# Patient Record
Sex: FEMALE | Race: WHITE | NOT HISPANIC OR LATINO | Employment: UNEMPLOYED
[De-identification: names, ages, dates, MRNs, and addresses within clinical notes are randomized per-mention and may not be internally consistent; named-entity substitution may affect disease eponyms.]

---

## 2017-02-04 ENCOUNTER — RECORDS - HEALTHEAST (OUTPATIENT)
Dept: ADMINISTRATIVE | Facility: OTHER | Age: 62
End: 2017-02-04

## 2017-02-06 ENCOUNTER — RECORDS - HEALTHEAST (OUTPATIENT)
Dept: ADMINISTRATIVE | Facility: OTHER | Age: 62
End: 2017-02-06

## 2017-05-08 ENCOUNTER — RECORDS - HEALTHEAST (OUTPATIENT)
Dept: ADMINISTRATIVE | Facility: OTHER | Age: 62
End: 2017-05-08

## 2017-08-09 ENCOUNTER — RECORDS - HEALTHEAST (OUTPATIENT)
Dept: ADMINISTRATIVE | Facility: OTHER | Age: 62
End: 2017-08-09

## 2017-08-10 ENCOUNTER — RECORDS - HEALTHEAST (OUTPATIENT)
Dept: ADMINISTRATIVE | Facility: OTHER | Age: 62
End: 2017-08-10

## 2017-09-27 ENCOUNTER — RECORDS - HEALTHEAST (OUTPATIENT)
Dept: ADMINISTRATIVE | Facility: OTHER | Age: 62
End: 2017-09-27

## 2017-09-27 LAB
HPV_EXT - HISTORICAL: NORMAL
PAP SMEAR - HIM PATIENT REPORTED: NORMAL

## 2017-11-10 ENCOUNTER — RECORDS - HEALTHEAST (OUTPATIENT)
Dept: ADMINISTRATIVE | Facility: OTHER | Age: 62
End: 2017-11-10

## 2017-11-16 ENCOUNTER — RECORDS - HEALTHEAST (OUTPATIENT)
Dept: ADMINISTRATIVE | Facility: OTHER | Age: 62
End: 2017-11-16

## 2017-12-24 ENCOUNTER — HEALTH MAINTENANCE LETTER (OUTPATIENT)
Age: 62
End: 2017-12-24

## 2018-01-17 ENCOUNTER — OFFICE VISIT - HEALTHEAST (OUTPATIENT)
Dept: INTERNAL MEDICINE | Facility: CLINIC | Age: 63
End: 2018-01-17

## 2018-01-17 DIAGNOSIS — Z12.31 VISIT FOR SCREENING MAMMOGRAM: ICD-10-CM

## 2018-01-17 DIAGNOSIS — E11.9 TYPE 2 DIABETES MELLITUS (H): ICD-10-CM

## 2018-01-17 DIAGNOSIS — G47.33 OSA (OBSTRUCTIVE SLEEP APNEA): ICD-10-CM

## 2018-01-17 DIAGNOSIS — K21.9 GASTROESOPHAGEAL REFLUX DISEASE, ESOPHAGITIS PRESENCE NOT SPECIFIED: ICD-10-CM

## 2018-01-17 DIAGNOSIS — E66.01 MORBID OBESITY (H): ICD-10-CM

## 2018-01-17 DIAGNOSIS — R00.0 TACHYCARDIA: ICD-10-CM

## 2018-01-17 DIAGNOSIS — Z12.11 COLON CANCER SCREENING: ICD-10-CM

## 2018-01-17 DIAGNOSIS — H10.32 ACUTE CONJUNCTIVITIS OF LEFT EYE, UNSPECIFIED ACUTE CONJUNCTIVITIS TYPE: ICD-10-CM

## 2018-01-17 DIAGNOSIS — E03.9 HYPOTHYROID: ICD-10-CM

## 2018-01-17 DIAGNOSIS — E55.9 VITAMIN D DEFICIENCY: ICD-10-CM

## 2018-01-17 LAB
ALBUMIN SERPL-MCNC: 3.3 G/DL (ref 3.5–5)
ALP SERPL-CCNC: 136 U/L (ref 45–120)
ALT SERPL W P-5'-P-CCNC: 19 U/L (ref 0–45)
ANION GAP SERPL CALCULATED.3IONS-SCNC: 10 MMOL/L (ref 5–18)
AST SERPL W P-5'-P-CCNC: 20 U/L (ref 0–40)
ATRIAL RATE - MUSE: 98 BPM
BILIRUB SERPL-MCNC: 0.7 MG/DL (ref 0–1)
BUN SERPL-MCNC: 19 MG/DL (ref 8–22)
CALCIUM SERPL-MCNC: 9.5 MG/DL (ref 8.5–10.5)
CHLORIDE BLD-SCNC: 101 MMOL/L (ref 98–107)
CO2 SERPL-SCNC: 27 MMOL/L (ref 22–31)
CREAT SERPL-MCNC: 0.96 MG/DL (ref 0.6–1.1)
DIASTOLIC BLOOD PRESSURE - MUSE: NORMAL MMHG
GFR SERPL CREATININE-BSD FRML MDRD: 59 ML/MIN/1.73M2
GLUCOSE BLD-MCNC: 194 MG/DL (ref 70–125)
HBA1C MFR BLD: 6.5 % (ref 3.5–6)
INTERPRETATION ECG - MUSE: NORMAL
LDLC SERPL CALC-MCNC: 76 MG/DL
P AXIS - MUSE: 40 DEGREES
POTASSIUM BLD-SCNC: 3.9 MMOL/L (ref 3.5–5)
PR INTERVAL - MUSE: 150 MS
PROT SERPL-MCNC: 7.6 G/DL (ref 6–8)
QRS DURATION - MUSE: 80 MS
QT - MUSE: 348 MS
QTC - MUSE: 444 MS
R AXIS - MUSE: 57 DEGREES
SODIUM SERPL-SCNC: 138 MMOL/L (ref 136–145)
SYSTOLIC BLOOD PRESSURE - MUSE: NORMAL MMHG
T AXIS - MUSE: 18 DEGREES
TSH SERPL DL<=0.005 MIU/L-ACNC: 3.56 UIU/ML (ref 0.3–5)
VENTRICULAR RATE- MUSE: 98 BPM

## 2018-01-17 ASSESSMENT — MIFFLIN-ST. JEOR: SCORE: 2093.57

## 2018-01-18 ENCOUNTER — COMMUNICATION - HEALTHEAST (OUTPATIENT)
Dept: INTERNAL MEDICINE | Facility: CLINIC | Age: 63
End: 2018-01-18

## 2018-01-19 ENCOUNTER — COMMUNICATION - HEALTHEAST (OUTPATIENT)
Dept: INTERNAL MEDICINE | Facility: CLINIC | Age: 63
End: 2018-01-19

## 2018-01-22 ENCOUNTER — AMBULATORY - HEALTHEAST (OUTPATIENT)
Dept: EDUCATION SERVICES | Facility: CLINIC | Age: 63
End: 2018-01-22

## 2018-01-22 DIAGNOSIS — E10.65 CONTROLLED TYPE 1 DIABETES MELLITUS WITH HYPERGLYCEMIA (H): ICD-10-CM

## 2018-01-29 ENCOUNTER — RECORDS - HEALTHEAST (OUTPATIENT)
Dept: ADMINISTRATIVE | Facility: OTHER | Age: 63
End: 2018-01-29

## 2018-01-30 ENCOUNTER — COMMUNICATION - HEALTHEAST (OUTPATIENT)
Dept: EDUCATION SERVICES | Facility: CLINIC | Age: 63
End: 2018-01-30

## 2018-02-08 ENCOUNTER — COMMUNICATION - HEALTHEAST (OUTPATIENT)
Dept: INTERNAL MEDICINE | Facility: CLINIC | Age: 63
End: 2018-02-08

## 2018-02-08 ENCOUNTER — RECORDS - HEALTHEAST (OUTPATIENT)
Dept: ADMINISTRATIVE | Facility: OTHER | Age: 63
End: 2018-02-08

## 2018-02-14 ENCOUNTER — COMMUNICATION - HEALTHEAST (OUTPATIENT)
Dept: INTERNAL MEDICINE | Facility: CLINIC | Age: 63
End: 2018-02-14

## 2018-02-15 ENCOUNTER — RECORDS - HEALTHEAST (OUTPATIENT)
Dept: ADMINISTRATIVE | Facility: OTHER | Age: 63
End: 2018-02-15

## 2018-02-21 ENCOUNTER — COMMUNICATION - HEALTHEAST (OUTPATIENT)
Dept: INTERNAL MEDICINE | Facility: CLINIC | Age: 63
End: 2018-02-21

## 2018-02-26 ENCOUNTER — COMMUNICATION - HEALTHEAST (OUTPATIENT)
Dept: INTERNAL MEDICINE | Facility: CLINIC | Age: 63
End: 2018-02-26

## 2018-02-27 ENCOUNTER — OFFICE VISIT - HEALTHEAST (OUTPATIENT)
Dept: ENDOCRINOLOGY | Facility: CLINIC | Age: 63
End: 2018-02-27

## 2018-02-27 ENCOUNTER — AMBULATORY - HEALTHEAST (OUTPATIENT)
Dept: ENDOCRINOLOGY | Facility: CLINIC | Age: 63
End: 2018-02-27

## 2018-02-27 DIAGNOSIS — E11.9 TYPE 2 DIABETES MELLITUS (H): ICD-10-CM

## 2018-02-27 ASSESSMENT — MIFFLIN-ST. JEOR: SCORE: 2119.43

## 2018-02-28 ENCOUNTER — RECORDS - HEALTHEAST (OUTPATIENT)
Dept: ADMINISTRATIVE | Facility: OTHER | Age: 63
End: 2018-02-28

## 2018-03-05 ENCOUNTER — OFFICE VISIT - HEALTHEAST (OUTPATIENT)
Dept: INTERNAL MEDICINE | Facility: CLINIC | Age: 63
End: 2018-03-05

## 2018-03-05 DIAGNOSIS — H10.9 CONJUNCTIVITIS: ICD-10-CM

## 2018-03-05 ASSESSMENT — MIFFLIN-ST. JEOR: SCORE: 2116.25

## 2018-03-07 ENCOUNTER — HOSPITAL ENCOUNTER (OUTPATIENT)
Dept: MAMMOGRAPHY | Facility: CLINIC | Age: 63
Discharge: HOME | End: 2018-03-07
Attending: INTERNAL MEDICINE

## 2018-03-07 DIAGNOSIS — Z12.31 VISIT FOR SCREENING MAMMOGRAM: ICD-10-CM

## 2018-03-16 ENCOUNTER — RECORDS - HEALTHEAST (OUTPATIENT)
Dept: ADMINISTRATIVE | Facility: OTHER | Age: 63
End: 2018-03-16

## 2018-03-20 ENCOUNTER — COMMUNICATION - HEALTHEAST (OUTPATIENT)
Dept: INTERNAL MEDICINE | Facility: CLINIC | Age: 63
End: 2018-03-20

## 2018-04-20 ENCOUNTER — OFFICE VISIT - HEALTHEAST (OUTPATIENT)
Dept: INTERNAL MEDICINE | Facility: CLINIC | Age: 63
End: 2018-04-20

## 2018-04-20 DIAGNOSIS — J30.9 ALLERGIC RHINITIS: ICD-10-CM

## 2018-04-20 ASSESSMENT — MIFFLIN-ST. JEOR: SCORE: 2116.25

## 2018-05-22 ENCOUNTER — COMMUNICATION - HEALTHEAST (OUTPATIENT)
Dept: INTERNAL MEDICINE | Facility: CLINIC | Age: 63
End: 2018-05-22

## 2018-05-24 ENCOUNTER — RECORDS - HEALTHEAST (OUTPATIENT)
Dept: ADMINISTRATIVE | Facility: OTHER | Age: 63
End: 2018-05-24

## 2018-06-25 ENCOUNTER — AMBULATORY - HEALTHEAST (OUTPATIENT)
Dept: LAB | Facility: CLINIC | Age: 63
End: 2018-06-25

## 2018-06-25 DIAGNOSIS — E11.9 TYPE 2 DIABETES MELLITUS (H): ICD-10-CM

## 2018-06-25 LAB
CREAT UR-MCNC: 239 MG/DL
HBA1C MFR BLD: 6.2 % (ref 3.5–6)
MICROALBUMIN UR-MCNC: 1.16 MG/DL (ref 0–1.99)
MICROALBUMIN/CREAT UR: 4.9 MG/G

## 2018-07-24 ENCOUNTER — OFFICE VISIT - HEALTHEAST (OUTPATIENT)
Dept: INTERNAL MEDICINE | Facility: CLINIC | Age: 63
End: 2018-07-24

## 2018-07-24 DIAGNOSIS — E66.01 MORBID OBESITY (H): ICD-10-CM

## 2018-07-24 DIAGNOSIS — K21.9 GASTROESOPHAGEAL REFLUX DISEASE, ESOPHAGITIS PRESENCE NOT SPECIFIED: ICD-10-CM

## 2018-07-24 DIAGNOSIS — R60.9 EDEMA, UNSPECIFIED TYPE: ICD-10-CM

## 2018-07-24 DIAGNOSIS — E03.9 ACQUIRED HYPOTHYROIDISM: ICD-10-CM

## 2018-07-24 DIAGNOSIS — G47.33 OSA (OBSTRUCTIVE SLEEP APNEA): ICD-10-CM

## 2018-07-24 DIAGNOSIS — E55.9 VITAMIN D DEFICIENCY: ICD-10-CM

## 2018-07-24 DIAGNOSIS — E10.9 TYPE 1 DIABETES MELLITUS (H): ICD-10-CM

## 2018-07-24 LAB
ALBUMIN SERPL-MCNC: 3.1 G/DL (ref 3.5–5)
ALBUMIN UR-MCNC: ABNORMAL MG/DL
ALP SERPL-CCNC: 103 U/L (ref 45–120)
ALT SERPL W P-5'-P-CCNC: 24 U/L (ref 0–45)
ANION GAP SERPL CALCULATED.3IONS-SCNC: 10 MMOL/L (ref 5–18)
APPEARANCE UR: ABNORMAL
AST SERPL W P-5'-P-CCNC: 21 U/L (ref 0–40)
BACTERIA #/AREA URNS HPF: ABNORMAL HPF
BILIRUB SERPL-MCNC: 0.8 MG/DL (ref 0–1)
BILIRUB UR QL STRIP: ABNORMAL
BUN SERPL-MCNC: 13 MG/DL (ref 8–22)
CALCIUM SERPL-MCNC: 9.3 MG/DL (ref 8.5–10.5)
CHLORIDE BLD-SCNC: 108 MMOL/L (ref 98–107)
CHOLEST SERPL-MCNC: 109 MG/DL
CO2 SERPL-SCNC: 23 MMOL/L (ref 22–31)
COLOR UR AUTO: YELLOW
CREAT SERPL-MCNC: 0.86 MG/DL (ref 0.6–1.1)
FASTING STATUS PATIENT QL REPORTED: YES
GFR SERPL CREATININE-BSD FRML MDRD: >60 ML/MIN/1.73M2
GLUCOSE BLD-MCNC: 206 MG/DL (ref 70–125)
GLUCOSE UR STRIP-MCNC: NEGATIVE MG/DL
HDLC SERPL-MCNC: 43 MG/DL
HGB UR QL STRIP: ABNORMAL
KETONES UR STRIP-MCNC: ABNORMAL MG/DL
LDLC SERPL CALC-MCNC: 50 MG/DL
LEUKOCYTE ESTERASE UR QL STRIP: ABNORMAL
MUCOUS THREADS #/AREA URNS LPF: ABNORMAL LPF
NITRATE UR QL: NEGATIVE
PH UR STRIP: 5.5 [PH] (ref 5–8)
POTASSIUM BLD-SCNC: 4 MMOL/L (ref 3.5–5)
PROT SERPL-MCNC: 6.4 G/DL (ref 6–8)
RBC #/AREA URNS AUTO: ABNORMAL HPF
SODIUM SERPL-SCNC: 141 MMOL/L (ref 136–145)
SP GR UR STRIP: >=1.03 (ref 1–1.03)
SQUAMOUS #/AREA URNS AUTO: ABNORMAL LPF
TRIGL SERPL-MCNC: 79 MG/DL
TSH SERPL DL<=0.005 MIU/L-ACNC: 1.2 UIU/ML (ref 0.3–5)
UROBILINOGEN UR STRIP-ACNC: ABNORMAL
WBC #/AREA URNS AUTO: ABNORMAL HPF
WBC CLUMPS #/AREA URNS HPF: PRESENT /[HPF]

## 2018-07-24 ASSESSMENT — MIFFLIN-ST. JEOR: SCORE: 2070.89

## 2018-07-25 LAB
25(OH)D3 SERPL-MCNC: 47.4 NG/ML (ref 30–80)
BACTERIA SPEC CULT: NORMAL

## 2018-07-26 ENCOUNTER — COMMUNICATION - HEALTHEAST (OUTPATIENT)
Dept: INTERNAL MEDICINE | Facility: CLINIC | Age: 63
End: 2018-07-26

## 2018-07-26 DIAGNOSIS — R82.90 ABNORMAL URINALYSIS: ICD-10-CM

## 2018-07-31 ENCOUNTER — HOSPITAL ENCOUNTER (OUTPATIENT)
Dept: ULTRASOUND IMAGING | Facility: CLINIC | Age: 63
Discharge: HOME | End: 2018-07-31
Attending: INTERNAL MEDICINE

## 2018-07-31 ENCOUNTER — AMBULATORY - HEALTHEAST (OUTPATIENT)
Dept: LAB | Facility: CLINIC | Age: 63
End: 2018-07-31

## 2018-07-31 ENCOUNTER — COMMUNICATION - HEALTHEAST (OUTPATIENT)
Dept: INTERNAL MEDICINE | Facility: CLINIC | Age: 63
End: 2018-07-31

## 2018-07-31 DIAGNOSIS — R82.90 ABNORMAL URINALYSIS: ICD-10-CM

## 2018-07-31 DIAGNOSIS — R60.9 EDEMA, UNSPECIFIED TYPE: ICD-10-CM

## 2018-07-31 LAB
ALBUMIN UR-MCNC: ABNORMAL MG/DL
APPEARANCE UR: CLEAR
BACTERIA #/AREA URNS HPF: ABNORMAL HPF
BILIRUB UR QL STRIP: ABNORMAL
COLOR UR AUTO: YELLOW
GLUCOSE UR STRIP-MCNC: ABNORMAL MG/DL
HGB UR QL STRIP: NEGATIVE
HYALINE CASTS #/AREA URNS LPF: ABNORMAL LPF
KETONES UR STRIP-MCNC: ABNORMAL MG/DL
LEUKOCYTE ESTERASE UR QL STRIP: NEGATIVE
MUCOUS THREADS #/AREA URNS LPF: ABNORMAL LPF
NITRATE UR QL: NEGATIVE
PH UR STRIP: 5.5 [PH] (ref 5–8)
RBC #/AREA URNS AUTO: ABNORMAL HPF
SP GR UR STRIP: >=1.03 (ref 1–1.03)
SQUAMOUS #/AREA URNS AUTO: ABNORMAL LPF
UROBILINOGEN UR STRIP-ACNC: ABNORMAL
WBC #/AREA URNS AUTO: ABNORMAL HPF

## 2018-08-02 ENCOUNTER — COMMUNICATION - HEALTHEAST (OUTPATIENT)
Dept: INTERNAL MEDICINE | Facility: CLINIC | Age: 63
End: 2018-08-02

## 2018-08-02 DIAGNOSIS — R82.90 ABNORMAL URINALYSIS: ICD-10-CM

## 2018-08-10 ENCOUNTER — HOSPITAL ENCOUNTER (OUTPATIENT)
Dept: CARDIOLOGY | Facility: CLINIC | Age: 63
Discharge: HOME | End: 2018-08-10
Attending: INTERNAL MEDICINE

## 2018-08-10 DIAGNOSIS — R60.9 EDEMA, UNSPECIFIED TYPE: ICD-10-CM

## 2018-08-10 LAB
AORTIC ROOT: 3.1 CM
AORTIC VALVE MEAN VELOCITY: 117 CM/S
AV DIMENSIONLESS INDEX VTI: 0.8
AV MEAN GRADIENT: 6 MMHG
AV PEAK GRADIENT: 9.9 MMHG
AV VALVE AREA: 2.4 CM2
AV VELOCITY RATIO: 0.9
BSA FOR ECHO PROCEDURE: 2.63 M2
CV BLOOD PRESSURE: NORMAL MMHG
CV ECHO HEIGHT: 65 IN
CV ECHO WEIGHT: 334 LBS
DOP CALC AO PEAK VEL: 157 CM/S
DOP CALC AO VTI: 35.4 CM
DOP CALC LVOT AREA: 3.14 CM2
DOP CALC LVOT DIAMETER: 2 CM
DOP CALC LVOT PEAK VEL: 134 CM/S
DOP CALC LVOT STROKE VOLUME: 84.5 CM3
DOP CALCLVOT PEAK VEL VTI: 26.9 CM
EJECTION FRACTION: 57 % (ref 55–75)
FRACTIONAL SHORTENING: 31.4 % (ref 28–44)
INTERVENTRICULAR SEPTUM IN END DIASTOLE: 1 CM (ref 0.6–0.9)
IVS/PW RATIO: 1
LA AREA 1: 15.5 CM2
LA AREA 2: 19.7 CM2
LEFT ATRIUM LENGTH: 5.03 CM
LEFT ATRIUM SIZE: 4.3 CM
LEFT ATRIUM TO AORTIC ROOT RATIO: 1.39 NO UNITS
LEFT ATRIUM VOLUME INDEX: 19.6 ML/M2
LEFT ATRIUM VOLUME: 51.6 ML
LEFT VENTRICLE CARDIAC INDEX: 2.4 L/MIN/M2
LEFT VENTRICLE CARDIAC OUTPUT: 6.4 L/MIN
LEFT VENTRICLE DIASTOLIC VOLUME INDEX: 25.5 CM3/M2 (ref 34–74)
LEFT VENTRICLE DIASTOLIC VOLUME: 67 CM3 (ref 46–106)
LEFT VENTRICLE HEART RATE: 76 BPM
LEFT VENTRICLE MASS INDEX: 71.5 G/M2
LEFT VENTRICLE SYSTOLIC VOLUME INDEX: 11 CM3/M2 (ref 11–31)
LEFT VENTRICLE SYSTOLIC VOLUME: 29 CM3 (ref 14–42)
LEFT VENTRICULAR INTERNAL DIMENSION IN DIASTOLE: 5.1 CM (ref 3.8–5.2)
LEFT VENTRICULAR INTERNAL DIMENSION IN SYSTOLE: 3.5 CM (ref 2.2–3.5)
LEFT VENTRICULAR MASS: 188 G
LEFT VENTRICULAR OUTFLOW TRACT MEAN GRADIENT: 4 MMHG
LEFT VENTRICULAR OUTFLOW TRACT MEAN VELOCITY: 85.8 CM/S
LEFT VENTRICULAR OUTFLOW TRACT PEAK GRADIENT: 7 MMHG
LEFT VENTRICULAR POSTERIOR WALL IN END DIASTOLE: 1 CM (ref 0.6–0.9)
LV STROKE VOLUME INDEX: 32.1 ML/M2
MITRAL VALVE E/A RATIO: 1
MV AVERAGE E/E' RATIO: 7 CM/S
MV DECELERATION TIME: 296 MS
MV E'TISSUE VEL-LAT: 12.4 CM/S
MV E'TISSUE VEL-MED: 8.68 CM/S
MV LATERAL E/E' RATIO: 5.9
MV MEDIAL E/E' RATIO: 8.5
MV PEAK A VELOCITY: 70.2 CM/S
MV PEAK E VELOCITY: 73.7 CM/S
NUC REST DIASTOLIC VOLUME INDEX: 5344 LBS
NUC REST SYSTOLIC VOLUME INDEX: 65 IN
TRICUSPID VALVE ANULAR PLANE SYSTOLIC EXCURSION: 1.9 CM

## 2018-08-10 ASSESSMENT — MIFFLIN-ST. JEOR: SCORE: 2070.89

## 2018-08-14 ENCOUNTER — COMMUNICATION - HEALTHEAST (OUTPATIENT)
Dept: INTERNAL MEDICINE | Facility: CLINIC | Age: 63
End: 2018-08-14

## 2018-08-16 ENCOUNTER — AMBULATORY - HEALTHEAST (OUTPATIENT)
Dept: LAB | Facility: CLINIC | Age: 63
End: 2018-08-16

## 2018-08-16 DIAGNOSIS — R82.90 ABNORMAL URINALYSIS: ICD-10-CM

## 2018-08-16 LAB
ALBUMIN UR-MCNC: NEGATIVE MG/DL
APPEARANCE UR: ABNORMAL
BACTERIA #/AREA URNS HPF: ABNORMAL HPF
BILIRUB UR QL STRIP: NEGATIVE
COLOR UR AUTO: YELLOW
GLUCOSE UR STRIP-MCNC: ABNORMAL MG/DL
HGB UR QL STRIP: NEGATIVE
KETONES UR STRIP-MCNC: NEGATIVE MG/DL
LEUKOCYTE ESTERASE UR QL STRIP: ABNORMAL
NITRATE UR QL: NEGATIVE
PH UR STRIP: 5.5 [PH] (ref 5–8)
RBC #/AREA URNS AUTO: ABNORMAL HPF
SP GR UR STRIP: 1.02 (ref 1–1.03)
SQUAMOUS #/AREA URNS AUTO: ABNORMAL LPF
UROBILINOGEN UR STRIP-ACNC: ABNORMAL
WBC #/AREA URNS AUTO: ABNORMAL HPF

## 2018-08-17 ENCOUNTER — COMMUNICATION - HEALTHEAST (OUTPATIENT)
Dept: INTERNAL MEDICINE | Facility: CLINIC | Age: 63
End: 2018-08-17

## 2018-08-18 LAB — BACTERIA SPEC CULT: NORMAL

## 2018-10-09 ENCOUNTER — AMBULATORY - HEALTHEAST (OUTPATIENT)
Dept: NURSING | Facility: CLINIC | Age: 63
End: 2018-10-09

## 2018-10-09 DIAGNOSIS — Z23 NEED FOR INFLUENZA VACCINATION: ICD-10-CM

## 2018-11-14 ENCOUNTER — OFFICE VISIT - HEALTHEAST (OUTPATIENT)
Dept: INTERNAL MEDICINE | Facility: CLINIC | Age: 63
End: 2018-11-14

## 2018-11-14 ENCOUNTER — COMMUNICATION - HEALTHEAST (OUTPATIENT)
Dept: INTERNAL MEDICINE | Facility: CLINIC | Age: 63
End: 2018-11-14

## 2018-11-14 ENCOUNTER — COMMUNICATION - HEALTHEAST (OUTPATIENT)
Dept: NURSING | Facility: CLINIC | Age: 63
End: 2018-11-14

## 2018-11-14 DIAGNOSIS — R21 RASH: ICD-10-CM

## 2018-11-14 DIAGNOSIS — E10.9 TYPE 1 DIABETES MELLITUS WITHOUT COMPLICATION (H): ICD-10-CM

## 2018-11-14 DIAGNOSIS — J06.9 URTI (ACUTE UPPER RESPIRATORY INFECTION): ICD-10-CM

## 2018-11-14 ASSESSMENT — MIFFLIN-ST. JEOR: SCORE: 2021

## 2018-11-23 ENCOUNTER — COMMUNICATION - HEALTHEAST (OUTPATIENT)
Dept: NURSING | Facility: CLINIC | Age: 63
End: 2018-11-23

## 2018-12-27 ENCOUNTER — COMMUNICATION - HEALTHEAST (OUTPATIENT)
Dept: ENDOCRINOLOGY | Facility: CLINIC | Age: 63
End: 2018-12-27

## 2018-12-27 ENCOUNTER — COMMUNICATION - HEALTHEAST (OUTPATIENT)
Dept: INTERNAL MEDICINE | Facility: CLINIC | Age: 63
End: 2018-12-27

## 2018-12-27 ENCOUNTER — AMBULATORY - HEALTHEAST (OUTPATIENT)
Dept: ENDOCRINOLOGY | Facility: CLINIC | Age: 63
End: 2018-12-27

## 2018-12-27 DIAGNOSIS — E10.9 TYPE 1 DIABETES MELLITUS WITHOUT COMPLICATION (H): ICD-10-CM

## 2019-01-04 ENCOUNTER — COMMUNICATION - HEALTHEAST (OUTPATIENT)
Dept: INTERNAL MEDICINE | Facility: CLINIC | Age: 64
End: 2019-01-04

## 2019-01-24 ENCOUNTER — OFFICE VISIT - HEALTHEAST (OUTPATIENT)
Dept: INTERNAL MEDICINE | Facility: CLINIC | Age: 64
End: 2019-01-24

## 2019-01-24 DIAGNOSIS — E03.9 ACQUIRED HYPOTHYROIDISM: ICD-10-CM

## 2019-01-24 DIAGNOSIS — I10 ESSENTIAL HYPERTENSION, BENIGN: ICD-10-CM

## 2019-01-24 DIAGNOSIS — Z79.4 TYPE 2 DIABETES MELLITUS WITH COMPLICATION, WITH LONG-TERM CURRENT USE OF INSULIN (H): ICD-10-CM

## 2019-01-24 DIAGNOSIS — R09.82 POST-NASAL DRAINAGE: ICD-10-CM

## 2019-01-24 DIAGNOSIS — E11.8 TYPE 2 DIABETES MELLITUS WITH COMPLICATION, WITH LONG-TERM CURRENT USE OF INSULIN (H): ICD-10-CM

## 2019-01-24 DIAGNOSIS — G47.33 OSA (OBSTRUCTIVE SLEEP APNEA): ICD-10-CM

## 2019-01-24 DIAGNOSIS — E66.01 MORBID OBESITY (H): ICD-10-CM

## 2019-01-24 LAB
ANION GAP SERPL CALCULATED.3IONS-SCNC: 11 MMOL/L (ref 5–18)
BUN SERPL-MCNC: 15 MG/DL (ref 8–22)
CALCIUM SERPL-MCNC: 9.5 MG/DL (ref 8.5–10.5)
CHLORIDE BLD-SCNC: 103 MMOL/L (ref 98–107)
CO2 SERPL-SCNC: 23 MMOL/L (ref 22–31)
CREAT SERPL-MCNC: 0.96 MG/DL (ref 0.6–1.1)
GFR SERPL CREATININE-BSD FRML MDRD: 59 ML/MIN/1.73M2
GLUCOSE BLD-MCNC: 259 MG/DL (ref 70–125)
HBA1C MFR BLD: 6.9 % (ref 3.5–6)
POTASSIUM BLD-SCNC: 4.4 MMOL/L (ref 3.5–5)
SODIUM SERPL-SCNC: 137 MMOL/L (ref 136–145)

## 2019-01-24 ASSESSMENT — MIFFLIN-ST. JEOR: SCORE: 1943.89

## 2019-02-25 ENCOUNTER — OFFICE VISIT - HEALTHEAST (OUTPATIENT)
Dept: INTERNAL MEDICINE | Facility: CLINIC | Age: 64
End: 2019-02-25

## 2019-02-25 DIAGNOSIS — E55.9 VITAMIN D DEFICIENCY: ICD-10-CM

## 2019-02-25 DIAGNOSIS — I10 ESSENTIAL HYPERTENSION, BENIGN: ICD-10-CM

## 2019-02-25 DIAGNOSIS — Z79.4 TYPE 2 DIABETES MELLITUS WITH COMPLICATION, WITH LONG-TERM CURRENT USE OF INSULIN (H): ICD-10-CM

## 2019-02-25 DIAGNOSIS — E11.8 TYPE 2 DIABETES MELLITUS WITH COMPLICATION, WITH LONG-TERM CURRENT USE OF INSULIN (H): ICD-10-CM

## 2019-02-25 DIAGNOSIS — E66.01 MORBID OBESITY (H): ICD-10-CM

## 2019-02-25 LAB
ALBUMIN SERPL-MCNC: 3.2 G/DL (ref 3.5–5)
ALP SERPL-CCNC: 102 U/L (ref 45–120)
ALT SERPL W P-5'-P-CCNC: 13 U/L (ref 0–45)
ANION GAP SERPL CALCULATED.3IONS-SCNC: 6 MMOL/L (ref 5–18)
AST SERPL W P-5'-P-CCNC: 17 U/L (ref 0–40)
BILIRUB SERPL-MCNC: 0.8 MG/DL (ref 0–1)
BUN SERPL-MCNC: 15 MG/DL (ref 8–22)
CALCIUM SERPL-MCNC: 9.5 MG/DL (ref 8.5–10.5)
CHLORIDE BLD-SCNC: 107 MMOL/L (ref 98–107)
CHOLEST SERPL-MCNC: 125 MG/DL
CO2 SERPL-SCNC: 27 MMOL/L (ref 22–31)
CREAT SERPL-MCNC: 0.85 MG/DL (ref 0.6–1.1)
FASTING STATUS PATIENT QL REPORTED: YES
GFR SERPL CREATININE-BSD FRML MDRD: >60 ML/MIN/1.73M2
GLUCOSE BLD-MCNC: 76 MG/DL (ref 70–125)
HDLC SERPL-MCNC: 51 MG/DL
LDLC SERPL CALC-MCNC: 61 MG/DL
POTASSIUM BLD-SCNC: 3.5 MMOL/L (ref 3.5–5)
PROT SERPL-MCNC: 6.7 G/DL (ref 6–8)
SODIUM SERPL-SCNC: 140 MMOL/L (ref 136–145)
TRIGL SERPL-MCNC: 67 MG/DL

## 2019-02-25 ASSESSMENT — MIFFLIN-ST. JEOR: SCORE: 1948.42

## 2019-02-26 ENCOUNTER — COMMUNICATION - HEALTHEAST (OUTPATIENT)
Dept: INTERNAL MEDICINE | Facility: CLINIC | Age: 64
End: 2019-02-26

## 2019-02-26 LAB
25(OH)D3 SERPL-MCNC: 58.1 NG/ML (ref 30–80)
25(OH)D3 SERPL-MCNC: 58.1 NG/ML (ref 30–80)

## 2019-02-27 ENCOUNTER — COMMUNICATION - HEALTHEAST (OUTPATIENT)
Dept: INTERNAL MEDICINE | Facility: CLINIC | Age: 64
End: 2019-02-27

## 2019-03-05 ENCOUNTER — COMMUNICATION - HEALTHEAST (OUTPATIENT)
Dept: SURGERY | Facility: CLINIC | Age: 64
End: 2019-03-05

## 2019-03-07 ENCOUNTER — COMMUNICATION - HEALTHEAST (OUTPATIENT)
Dept: INTERNAL MEDICINE | Facility: CLINIC | Age: 64
End: 2019-03-07

## 2019-03-12 ENCOUNTER — AMBULATORY - HEALTHEAST (OUTPATIENT)
Dept: ENDOCRINOLOGY | Facility: CLINIC | Age: 64
End: 2019-03-12

## 2019-03-12 DIAGNOSIS — Z79.4 TYPE 2 DIABETES MELLITUS WITH COMPLICATION, WITH LONG-TERM CURRENT USE OF INSULIN (H): ICD-10-CM

## 2019-03-12 DIAGNOSIS — E11.8 TYPE 2 DIABETES MELLITUS WITH COMPLICATION, WITH LONG-TERM CURRENT USE OF INSULIN (H): ICD-10-CM

## 2019-03-14 ENCOUNTER — COMMUNICATION - HEALTHEAST (OUTPATIENT)
Dept: ENDOCRINOLOGY | Facility: CLINIC | Age: 64
End: 2019-03-14

## 2019-03-18 ENCOUNTER — COMMUNICATION - HEALTHEAST (OUTPATIENT)
Dept: ADMINISTRATIVE | Facility: CLINIC | Age: 64
End: 2019-03-18

## 2019-03-19 ENCOUNTER — AMBULATORY - HEALTHEAST (OUTPATIENT)
Dept: ENDOCRINOLOGY | Facility: CLINIC | Age: 64
End: 2019-03-19

## 2019-03-19 DIAGNOSIS — E11.8 TYPE 2 DIABETES MELLITUS WITH COMPLICATION, WITH LONG-TERM CURRENT USE OF INSULIN (H): ICD-10-CM

## 2019-03-19 DIAGNOSIS — Z79.4 TYPE 2 DIABETES MELLITUS WITH COMPLICATION, WITH LONG-TERM CURRENT USE OF INSULIN (H): ICD-10-CM

## 2019-03-20 ENCOUNTER — COMMUNICATION - HEALTHEAST (OUTPATIENT)
Dept: INTERNAL MEDICINE | Facility: CLINIC | Age: 64
End: 2019-03-20

## 2019-03-20 ENCOUNTER — RECORDS - HEALTHEAST (OUTPATIENT)
Dept: ADMINISTRATIVE | Facility: OTHER | Age: 64
End: 2019-03-20

## 2019-03-20 DIAGNOSIS — J31.0 CHRONIC RHINITIS: ICD-10-CM

## 2019-03-25 ENCOUNTER — AMBULATORY - HEALTHEAST (OUTPATIENT)
Dept: LAB | Facility: CLINIC | Age: 64
End: 2019-03-25

## 2019-03-25 DIAGNOSIS — Z79.4 TYPE 2 DIABETES MELLITUS WITH COMPLICATION, WITH LONG-TERM CURRENT USE OF INSULIN (H): ICD-10-CM

## 2019-03-25 DIAGNOSIS — E11.8 TYPE 2 DIABETES MELLITUS WITH COMPLICATION, WITH LONG-TERM CURRENT USE OF INSULIN (H): ICD-10-CM

## 2019-03-25 LAB — HBA1C MFR BLD: 6.9 % (ref 3.5–6)

## 2019-03-26 ENCOUNTER — OFFICE VISIT - HEALTHEAST (OUTPATIENT)
Dept: ENDOCRINOLOGY | Facility: CLINIC | Age: 64
End: 2019-03-26

## 2019-03-26 ENCOUNTER — RECORDS - HEALTHEAST (OUTPATIENT)
Dept: ADMINISTRATIVE | Facility: OTHER | Age: 64
End: 2019-03-26

## 2019-03-26 DIAGNOSIS — Z79.4 TYPE 2 DIABETES MELLITUS WITH COMPLICATION, WITH LONG-TERM CURRENT USE OF INSULIN (H): ICD-10-CM

## 2019-03-26 DIAGNOSIS — E11.8 TYPE 2 DIABETES MELLITUS WITH COMPLICATION, WITH LONG-TERM CURRENT USE OF INSULIN (H): ICD-10-CM

## 2019-03-26 ASSESSMENT — MIFFLIN-ST. JEOR: SCORE: 1956.13

## 2019-03-27 ENCOUNTER — COMMUNICATION - HEALTHEAST (OUTPATIENT)
Dept: ADMINISTRATIVE | Facility: CLINIC | Age: 64
End: 2019-03-27

## 2019-03-27 DIAGNOSIS — Z79.4 TYPE 2 DIABETES MELLITUS WITH COMPLICATION, WITH LONG-TERM CURRENT USE OF INSULIN (H): ICD-10-CM

## 2019-03-27 DIAGNOSIS — E11.8 TYPE 2 DIABETES MELLITUS WITH COMPLICATION, WITH LONG-TERM CURRENT USE OF INSULIN (H): ICD-10-CM

## 2019-03-29 ENCOUNTER — RECORDS - HEALTHEAST (OUTPATIENT)
Dept: ADMINISTRATIVE | Facility: OTHER | Age: 64
End: 2019-03-29

## 2019-04-10 ENCOUNTER — COMMUNICATION - HEALTHEAST (OUTPATIENT)
Dept: ENDOCRINOLOGY | Facility: CLINIC | Age: 64
End: 2019-04-10

## 2019-04-17 ENCOUNTER — AMBULATORY - HEALTHEAST (OUTPATIENT)
Dept: ENDOCRINOLOGY | Facility: CLINIC | Age: 64
End: 2019-04-17

## 2019-04-17 DIAGNOSIS — E11.8 TYPE 2 DIABETES MELLITUS WITH COMPLICATION, WITH LONG-TERM CURRENT USE OF INSULIN (H): ICD-10-CM

## 2019-04-17 DIAGNOSIS — Z79.4 TYPE 2 DIABETES MELLITUS WITH COMPLICATION, WITH LONG-TERM CURRENT USE OF INSULIN (H): ICD-10-CM

## 2019-04-18 ENCOUNTER — RECORDS - HEALTHEAST (OUTPATIENT)
Dept: HEALTH INFORMATION MANAGEMENT | Facility: CLINIC | Age: 64
End: 2019-04-18

## 2019-05-14 ENCOUNTER — COMMUNICATION - HEALTHEAST (OUTPATIENT)
Dept: ENDOCRINOLOGY | Facility: CLINIC | Age: 64
End: 2019-05-14

## 2019-07-09 ENCOUNTER — AMBULATORY - HEALTHEAST (OUTPATIENT)
Dept: LAB | Facility: CLINIC | Age: 64
End: 2019-07-09

## 2019-07-09 DIAGNOSIS — E11.8 TYPE 2 DIABETES MELLITUS WITH COMPLICATION, WITH LONG-TERM CURRENT USE OF INSULIN (H): ICD-10-CM

## 2019-07-09 DIAGNOSIS — Z79.4 TYPE 2 DIABETES MELLITUS WITH COMPLICATION, WITH LONG-TERM CURRENT USE OF INSULIN (H): ICD-10-CM

## 2019-07-09 LAB
CREAT UR-MCNC: 212 MG/DL
HBA1C MFR BLD: 6.8 % (ref 3.5–6)
MICROALBUMIN UR-MCNC: 2.19 MG/DL (ref 0–1.99)
MICROALBUMIN/CREAT UR: 10.3 MG/G

## 2019-07-29 ENCOUNTER — COMMUNICATION - HEALTHEAST (OUTPATIENT)
Dept: INTERNAL MEDICINE | Facility: CLINIC | Age: 64
End: 2019-07-29

## 2019-07-29 DIAGNOSIS — I10 ESSENTIAL HYPERTENSION, BENIGN: ICD-10-CM

## 2019-08-05 ENCOUNTER — OFFICE VISIT - HEALTHEAST (OUTPATIENT)
Dept: INTERNAL MEDICINE | Facility: CLINIC | Age: 64
End: 2019-08-05

## 2019-08-05 DIAGNOSIS — R21 RASH: ICD-10-CM

## 2019-08-05 DIAGNOSIS — Z79.4 TYPE 2 DIABETES MELLITUS WITH COMPLICATION, WITH LONG-TERM CURRENT USE OF INSULIN (H): ICD-10-CM

## 2019-08-05 DIAGNOSIS — K21.9 GASTROESOPHAGEAL REFLUX DISEASE, ESOPHAGITIS PRESENCE NOT SPECIFIED: ICD-10-CM

## 2019-08-05 DIAGNOSIS — E11.8 TYPE 2 DIABETES MELLITUS WITH COMPLICATION, WITH LONG-TERM CURRENT USE OF INSULIN (H): ICD-10-CM

## 2019-08-05 DIAGNOSIS — E03.9 ACQUIRED HYPOTHYROIDISM: ICD-10-CM

## 2019-08-05 DIAGNOSIS — E66.01 MORBID OBESITY (H): ICD-10-CM

## 2019-08-05 ASSESSMENT — MIFFLIN-ST. JEOR: SCORE: 2011.93

## 2019-09-02 ENCOUNTER — RECORDS - HEALTHEAST (OUTPATIENT)
Dept: ADMINISTRATIVE | Facility: OTHER | Age: 64
End: 2019-09-02

## 2019-09-09 ENCOUNTER — COMMUNICATION - HEALTHEAST (OUTPATIENT)
Dept: INTERNAL MEDICINE | Facility: CLINIC | Age: 64
End: 2019-09-09

## 2019-09-10 ENCOUNTER — COMMUNICATION - HEALTHEAST (OUTPATIENT)
Dept: ENDOCRINOLOGY | Facility: CLINIC | Age: 64
End: 2019-09-10

## 2019-09-10 DIAGNOSIS — Z79.4 TYPE 2 DIABETES MELLITUS WITH COMPLICATION, WITH LONG-TERM CURRENT USE OF INSULIN (H): ICD-10-CM

## 2019-09-10 DIAGNOSIS — E11.8 TYPE 2 DIABETES MELLITUS WITH COMPLICATION, WITH LONG-TERM CURRENT USE OF INSULIN (H): ICD-10-CM

## 2019-09-13 ENCOUNTER — COMMUNICATION - HEALTHEAST (OUTPATIENT)
Dept: INTERNAL MEDICINE | Facility: CLINIC | Age: 64
End: 2019-09-13

## 2019-09-13 ENCOUNTER — COMMUNICATION - HEALTHEAST (OUTPATIENT)
Dept: ADMINISTRATIVE | Facility: CLINIC | Age: 64
End: 2019-09-13

## 2019-09-13 DIAGNOSIS — Z79.4 TYPE 2 DIABETES MELLITUS WITH COMPLICATION, WITH LONG-TERM CURRENT USE OF INSULIN (H): ICD-10-CM

## 2019-09-13 DIAGNOSIS — E11.8 TYPE 2 DIABETES MELLITUS WITH COMPLICATION, WITH LONG-TERM CURRENT USE OF INSULIN (H): ICD-10-CM

## 2019-09-13 DIAGNOSIS — F51.04 CHRONIC INSOMNIA: ICD-10-CM

## 2019-09-17 ENCOUNTER — COMMUNICATION - HEALTHEAST (OUTPATIENT)
Dept: ENDOCRINOLOGY | Facility: CLINIC | Age: 64
End: 2019-09-17

## 2019-09-17 DIAGNOSIS — E11.8 TYPE 2 DIABETES MELLITUS WITH COMPLICATION, WITH LONG-TERM CURRENT USE OF INSULIN (H): ICD-10-CM

## 2019-09-17 DIAGNOSIS — Z79.4 TYPE 2 DIABETES MELLITUS WITH COMPLICATION, WITH LONG-TERM CURRENT USE OF INSULIN (H): ICD-10-CM

## 2019-09-24 ENCOUNTER — AMBULATORY - HEALTHEAST (OUTPATIENT)
Dept: ENDOCRINOLOGY | Facility: CLINIC | Age: 64
End: 2019-09-24

## 2019-09-24 ENCOUNTER — COMMUNICATION - HEALTHEAST (OUTPATIENT)
Dept: LAB | Facility: CLINIC | Age: 64
End: 2019-09-24

## 2019-09-24 DIAGNOSIS — Z79.4 TYPE 2 DIABETES MELLITUS WITH COMPLICATION, WITH LONG-TERM CURRENT USE OF INSULIN (H): ICD-10-CM

## 2019-09-24 DIAGNOSIS — E11.8 TYPE 2 DIABETES MELLITUS WITH COMPLICATION, WITH LONG-TERM CURRENT USE OF INSULIN (H): ICD-10-CM

## 2019-09-25 ENCOUNTER — COMMUNICATION - HEALTHEAST (OUTPATIENT)
Dept: INTERNAL MEDICINE | Facility: CLINIC | Age: 64
End: 2019-09-25

## 2019-09-25 DIAGNOSIS — E11.9 TYPE 2 DIABETES MELLITUS (H): ICD-10-CM

## 2019-09-27 ENCOUNTER — COMMUNICATION - HEALTHEAST (OUTPATIENT)
Dept: INTERNAL MEDICINE | Facility: CLINIC | Age: 64
End: 2019-09-27

## 2019-09-27 DIAGNOSIS — F51.04 CHRONIC INSOMNIA: ICD-10-CM

## 2019-10-09 ENCOUNTER — AMBULATORY - HEALTHEAST (OUTPATIENT)
Dept: LAB | Facility: CLINIC | Age: 64
End: 2019-10-09

## 2019-10-09 DIAGNOSIS — E11.8 TYPE 2 DIABETES MELLITUS WITH COMPLICATION, WITH LONG-TERM CURRENT USE OF INSULIN (H): ICD-10-CM

## 2019-10-09 DIAGNOSIS — Z79.4 TYPE 2 DIABETES MELLITUS WITH COMPLICATION, WITH LONG-TERM CURRENT USE OF INSULIN (H): ICD-10-CM

## 2019-10-09 LAB — HBA1C MFR BLD: 7.7 % (ref 3.5–6)

## 2019-10-12 ENCOUNTER — COMMUNICATION - HEALTHEAST (OUTPATIENT)
Dept: ENDOCRINOLOGY | Facility: CLINIC | Age: 64
End: 2019-10-12

## 2019-10-12 DIAGNOSIS — E11.8 TYPE 2 DIABETES MELLITUS WITH COMPLICATION, WITH LONG-TERM CURRENT USE OF INSULIN (H): ICD-10-CM

## 2019-10-12 DIAGNOSIS — Z79.4 TYPE 2 DIABETES MELLITUS WITH COMPLICATION, WITH LONG-TERM CURRENT USE OF INSULIN (H): ICD-10-CM

## 2019-10-15 ENCOUNTER — RECORDS - HEALTHEAST (OUTPATIENT)
Dept: ADMINISTRATIVE | Facility: OTHER | Age: 64
End: 2019-10-15

## 2019-10-15 LAB — RETINOPATHY: NEGATIVE

## 2019-10-22 ENCOUNTER — AMBULATORY - HEALTHEAST (OUTPATIENT)
Dept: LAB | Facility: CLINIC | Age: 64
End: 2019-10-22

## 2019-10-22 ENCOUNTER — AMBULATORY - HEALTHEAST (OUTPATIENT)
Dept: ENDOCRINOLOGY | Facility: CLINIC | Age: 64
End: 2019-10-22

## 2019-10-22 ENCOUNTER — OFFICE VISIT - HEALTHEAST (OUTPATIENT)
Dept: ENDOCRINOLOGY | Facility: CLINIC | Age: 64
End: 2019-10-22

## 2019-10-22 ENCOUNTER — COMMUNICATION - HEALTHEAST (OUTPATIENT)
Dept: ENDOCRINOLOGY | Facility: CLINIC | Age: 64
End: 2019-10-22

## 2019-10-22 DIAGNOSIS — E11.8 TYPE 2 DIABETES MELLITUS WITH COMPLICATION, WITH LONG-TERM CURRENT USE OF INSULIN (H): ICD-10-CM

## 2019-10-22 DIAGNOSIS — E03.9 ACQUIRED HYPOTHYROIDISM: ICD-10-CM

## 2019-10-22 DIAGNOSIS — Z79.4 TYPE 2 DIABETES MELLITUS WITH COMPLICATION, WITH LONG-TERM CURRENT USE OF INSULIN (H): ICD-10-CM

## 2019-10-22 LAB
ALBUMIN SERPL-MCNC: 3.6 G/DL (ref 3.5–5)
ALP SERPL-CCNC: 123 U/L (ref 45–120)
ALT SERPL W P-5'-P-CCNC: 18 U/L (ref 0–45)
ANION GAP SERPL CALCULATED.3IONS-SCNC: 9 MMOL/L (ref 5–18)
AST SERPL W P-5'-P-CCNC: 16 U/L (ref 0–40)
BILIRUB SERPL-MCNC: 1 MG/DL (ref 0–1)
BUN SERPL-MCNC: 22 MG/DL (ref 8–22)
CALCIUM SERPL-MCNC: 10.2 MG/DL (ref 8.5–10.5)
CHLORIDE BLD-SCNC: 105 MMOL/L (ref 98–107)
CHOLEST SERPL-MCNC: 145 MG/DL
CO2 SERPL-SCNC: 27 MMOL/L (ref 22–31)
CREAT SERPL-MCNC: 0.97 MG/DL (ref 0.6–1.1)
FASTING STATUS PATIENT QL REPORTED: YES
GFR SERPL CREATININE-BSD FRML MDRD: 58 ML/MIN/1.73M2
GLUCOSE BLD-MCNC: 68 MG/DL (ref 70–125)
HDLC SERPL-MCNC: 61 MG/DL
LDLC SERPL CALC-MCNC: 65 MG/DL
POTASSIUM BLD-SCNC: 4.2 MMOL/L (ref 3.5–5)
PROT SERPL-MCNC: 7.2 G/DL (ref 6–8)
SODIUM SERPL-SCNC: 141 MMOL/L (ref 136–145)
TRIGL SERPL-MCNC: 93 MG/DL
TSH SERPL DL<=0.005 MIU/L-ACNC: 0.3 UIU/ML (ref 0.3–5)

## 2019-10-23 ENCOUNTER — COMMUNICATION - HEALTHEAST (OUTPATIENT)
Dept: INTERNAL MEDICINE | Facility: CLINIC | Age: 64
End: 2019-10-23

## 2019-11-05 ENCOUNTER — COMMUNICATION - HEALTHEAST (OUTPATIENT)
Dept: ENDOCRINOLOGY | Facility: CLINIC | Age: 64
End: 2019-11-05

## 2019-11-05 DIAGNOSIS — E11.8 TYPE 2 DIABETES MELLITUS WITH COMPLICATION, WITH LONG-TERM CURRENT USE OF INSULIN (H): ICD-10-CM

## 2019-11-05 DIAGNOSIS — Z79.4 TYPE 2 DIABETES MELLITUS WITH COMPLICATION, WITH LONG-TERM CURRENT USE OF INSULIN (H): ICD-10-CM

## 2019-12-05 ENCOUNTER — COMMUNICATION - HEALTHEAST (OUTPATIENT)
Dept: ENDOCRINOLOGY | Facility: CLINIC | Age: 64
End: 2019-12-05

## 2019-12-05 DIAGNOSIS — Z79.4 TYPE 2 DIABETES MELLITUS WITH COMPLICATION, WITH LONG-TERM CURRENT USE OF INSULIN (H): ICD-10-CM

## 2019-12-05 DIAGNOSIS — E11.8 TYPE 2 DIABETES MELLITUS WITH COMPLICATION, WITH LONG-TERM CURRENT USE OF INSULIN (H): ICD-10-CM

## 2019-12-09 ENCOUNTER — RECORDS - HEALTHEAST (OUTPATIENT)
Dept: HEALTH INFORMATION MANAGEMENT | Facility: CLINIC | Age: 64
End: 2019-12-09

## 2019-12-18 ENCOUNTER — COMMUNICATION - HEALTHEAST (OUTPATIENT)
Dept: ADMINISTRATIVE | Facility: CLINIC | Age: 64
End: 2019-12-18

## 2019-12-18 DIAGNOSIS — E11.9 DIABETES MELLITUS, TYPE 2 (H): ICD-10-CM

## 2019-12-26 ENCOUNTER — COMMUNICATION - HEALTHEAST (OUTPATIENT)
Dept: ENDOCRINOLOGY | Facility: CLINIC | Age: 64
End: 2019-12-26

## 2019-12-26 DIAGNOSIS — E11.8 TYPE 2 DIABETES MELLITUS WITH COMPLICATION, WITH LONG-TERM CURRENT USE OF INSULIN (H): ICD-10-CM

## 2019-12-26 DIAGNOSIS — Z79.4 TYPE 2 DIABETES MELLITUS WITH COMPLICATION, WITH LONG-TERM CURRENT USE OF INSULIN (H): ICD-10-CM

## 2019-12-31 ENCOUNTER — COMMUNICATION - HEALTHEAST (OUTPATIENT)
Dept: ENDOCRINOLOGY | Facility: CLINIC | Age: 64
End: 2019-12-31

## 2019-12-31 DIAGNOSIS — Z79.4 TYPE 2 DIABETES MELLITUS WITH COMPLICATION, WITH LONG-TERM CURRENT USE OF INSULIN (H): ICD-10-CM

## 2019-12-31 DIAGNOSIS — E11.8 TYPE 2 DIABETES MELLITUS WITH COMPLICATION, WITH LONG-TERM CURRENT USE OF INSULIN (H): ICD-10-CM

## 2020-01-18 ENCOUNTER — COMMUNICATION - HEALTHEAST (OUTPATIENT)
Dept: INTERNAL MEDICINE | Facility: CLINIC | Age: 65
End: 2020-01-18

## 2020-01-18 DIAGNOSIS — Z98.84 BARIATRIC SURGERY STATUS: ICD-10-CM

## 2020-01-19 ENCOUNTER — COMMUNICATION - HEALTHEAST (OUTPATIENT)
Dept: INTERNAL MEDICINE | Facility: CLINIC | Age: 65
End: 2020-01-19

## 2020-01-19 DIAGNOSIS — K21.9 GASTROESOPHAGEAL REFLUX DISEASE, ESOPHAGITIS PRESENCE NOT SPECIFIED: ICD-10-CM

## 2020-01-24 ENCOUNTER — COMMUNICATION - HEALTHEAST (OUTPATIENT)
Dept: INTERNAL MEDICINE | Facility: CLINIC | Age: 65
End: 2020-01-24

## 2020-01-24 DIAGNOSIS — I10 ESSENTIAL HYPERTENSION, BENIGN: ICD-10-CM

## 2020-02-05 ENCOUNTER — OFFICE VISIT - HEALTHEAST (OUTPATIENT)
Dept: INTERNAL MEDICINE | Facility: CLINIC | Age: 65
End: 2020-02-05

## 2020-02-05 DIAGNOSIS — R21 RASH: ICD-10-CM

## 2020-02-05 DIAGNOSIS — E11.8 TYPE 2 DIABETES MELLITUS WITH COMPLICATION, WITH LONG-TERM CURRENT USE OF INSULIN (H): ICD-10-CM

## 2020-02-05 DIAGNOSIS — E66.01 MORBID OBESITY (H): ICD-10-CM

## 2020-02-05 DIAGNOSIS — K21.9 GASTROESOPHAGEAL REFLUX DISEASE, ESOPHAGITIS PRESENCE NOT SPECIFIED: ICD-10-CM

## 2020-02-05 DIAGNOSIS — Z12.31 VISIT FOR SCREENING MAMMOGRAM: ICD-10-CM

## 2020-02-05 DIAGNOSIS — I10 ESSENTIAL HYPERTENSION, BENIGN: ICD-10-CM

## 2020-02-05 DIAGNOSIS — L30.9 DERMATITIS: ICD-10-CM

## 2020-02-05 DIAGNOSIS — Z79.4 TYPE 2 DIABETES MELLITUS WITH COMPLICATION, WITH LONG-TERM CURRENT USE OF INSULIN (H): ICD-10-CM

## 2020-02-05 DIAGNOSIS — E03.9 ACQUIRED HYPOTHYROIDISM: ICD-10-CM

## 2020-02-05 LAB
ALBUMIN SERPL-MCNC: 3.6 G/DL (ref 3.5–5)
ALP SERPL-CCNC: 102 U/L (ref 45–120)
ALT SERPL W P-5'-P-CCNC: 18 U/L (ref 0–45)
ANION GAP SERPL CALCULATED.3IONS-SCNC: 9 MMOL/L (ref 5–18)
AST SERPL W P-5'-P-CCNC: 19 U/L (ref 0–40)
BILIRUB SERPL-MCNC: 0.6 MG/DL (ref 0–1)
BUN SERPL-MCNC: 21 MG/DL (ref 8–22)
CALCIUM SERPL-MCNC: 10.1 MG/DL (ref 8.5–10.5)
CHLORIDE BLD-SCNC: 102 MMOL/L (ref 98–107)
CHOLEST SERPL-MCNC: 173 MG/DL
CO2 SERPL-SCNC: 26 MMOL/L (ref 22–31)
CREAT SERPL-MCNC: 0.94 MG/DL (ref 0.6–1.1)
FASTING STATUS PATIENT QL REPORTED: YES
GFR SERPL CREATININE-BSD FRML MDRD: 60 ML/MIN/1.73M2
GLUCOSE BLD-MCNC: 147 MG/DL (ref 70–125)
HDLC SERPL-MCNC: 49 MG/DL
LDLC SERPL CALC-MCNC: 99 MG/DL
POTASSIUM BLD-SCNC: 4.5 MMOL/L (ref 3.5–5)
PROT SERPL-MCNC: 7.6 G/DL (ref 6–8)
SODIUM SERPL-SCNC: 137 MMOL/L (ref 136–145)
T4 FREE SERPL-MCNC: 1.2 NG/DL (ref 0.7–1.8)
TRIGL SERPL-MCNC: 124 MG/DL
TSH SERPL DL<=0.005 MIU/L-ACNC: 0.26 UIU/ML (ref 0.3–5)

## 2020-02-05 ASSESSMENT — MIFFLIN-ST. JEOR: SCORE: 2138.47

## 2020-02-07 ENCOUNTER — COMMUNICATION - HEALTHEAST (OUTPATIENT)
Dept: INTERNAL MEDICINE | Facility: CLINIC | Age: 65
End: 2020-02-07

## 2020-02-19 ENCOUNTER — COMMUNICATION - HEALTHEAST (OUTPATIENT)
Dept: ENDOCRINOLOGY | Facility: CLINIC | Age: 65
End: 2020-02-19

## 2020-02-19 ENCOUNTER — COMMUNICATION - HEALTHEAST (OUTPATIENT)
Dept: INTERNAL MEDICINE | Facility: CLINIC | Age: 65
End: 2020-02-19

## 2020-02-19 DIAGNOSIS — Z79.4 TYPE 2 DIABETES MELLITUS WITH COMPLICATION, WITH LONG-TERM CURRENT USE OF INSULIN (H): ICD-10-CM

## 2020-02-19 DIAGNOSIS — E11.8 TYPE 2 DIABETES MELLITUS WITH COMPLICATION, WITH LONG-TERM CURRENT USE OF INSULIN (H): ICD-10-CM

## 2020-02-24 ENCOUNTER — COMMUNICATION - HEALTHEAST (OUTPATIENT)
Dept: INTERNAL MEDICINE | Facility: CLINIC | Age: 65
End: 2020-02-24

## 2020-02-24 DIAGNOSIS — E55.9 VITAMIN D DEFICIENCY: ICD-10-CM

## 2020-03-01 ENCOUNTER — COMMUNICATION - HEALTHEAST (OUTPATIENT)
Dept: ENDOCRINOLOGY | Facility: CLINIC | Age: 65
End: 2020-03-01

## 2020-03-01 DIAGNOSIS — Z79.4 TYPE 2 DIABETES MELLITUS WITH COMPLICATION, WITH LONG-TERM CURRENT USE OF INSULIN (H): ICD-10-CM

## 2020-03-01 DIAGNOSIS — E11.8 TYPE 2 DIABETES MELLITUS WITH COMPLICATION, WITH LONG-TERM CURRENT USE OF INSULIN (H): ICD-10-CM

## 2020-03-02 ENCOUNTER — HEALTH MAINTENANCE LETTER (OUTPATIENT)
Age: 65
End: 2020-03-02

## 2020-03-13 ENCOUNTER — COMMUNICATION - HEALTHEAST (OUTPATIENT)
Dept: ENDOCRINOLOGY | Facility: CLINIC | Age: 65
End: 2020-03-13

## 2020-03-13 DIAGNOSIS — E11.8 TYPE 2 DIABETES MELLITUS WITH COMPLICATION, WITH LONG-TERM CURRENT USE OF INSULIN (H): ICD-10-CM

## 2020-03-13 DIAGNOSIS — Z79.4 TYPE 2 DIABETES MELLITUS WITH COMPLICATION, WITH LONG-TERM CURRENT USE OF INSULIN (H): ICD-10-CM

## 2020-04-03 ENCOUNTER — COMMUNICATION - HEALTHEAST (OUTPATIENT)
Dept: ENDOCRINOLOGY | Facility: CLINIC | Age: 65
End: 2020-04-03

## 2020-04-03 DIAGNOSIS — Z79.4 TYPE 2 DIABETES MELLITUS WITH COMPLICATION, WITH LONG-TERM CURRENT USE OF INSULIN (H): ICD-10-CM

## 2020-04-03 DIAGNOSIS — E11.8 TYPE 2 DIABETES MELLITUS WITH COMPLICATION, WITH LONG-TERM CURRENT USE OF INSULIN (H): ICD-10-CM

## 2020-04-03 DIAGNOSIS — E11.9 DIABETES MELLITUS, TYPE 2 (H): ICD-10-CM

## 2020-04-14 ENCOUNTER — COMMUNICATION - HEALTHEAST (OUTPATIENT)
Dept: INTERNAL MEDICINE | Facility: CLINIC | Age: 65
End: 2020-04-14

## 2020-04-14 DIAGNOSIS — I10 ESSENTIAL HYPERTENSION, BENIGN: ICD-10-CM

## 2020-04-20 ENCOUNTER — AMBULATORY - HEALTHEAST (OUTPATIENT)
Dept: LAB | Facility: CLINIC | Age: 65
End: 2020-04-20

## 2020-04-20 DIAGNOSIS — Z79.4 TYPE 2 DIABETES MELLITUS WITH COMPLICATION, WITH LONG-TERM CURRENT USE OF INSULIN (H): ICD-10-CM

## 2020-04-20 DIAGNOSIS — E11.8 TYPE 2 DIABETES MELLITUS WITH COMPLICATION, WITH LONG-TERM CURRENT USE OF INSULIN (H): ICD-10-CM

## 2020-04-20 LAB — HBA1C MFR BLD: 8.4 % (ref 3.5–6)

## 2020-04-24 ENCOUNTER — COMMUNICATION - HEALTHEAST (OUTPATIENT)
Dept: ENDOCRINOLOGY | Facility: CLINIC | Age: 65
End: 2020-04-24

## 2020-04-24 DIAGNOSIS — E11.9 DIABETES MELLITUS, TYPE 2 (H): ICD-10-CM

## 2020-04-28 ENCOUNTER — OFFICE VISIT - HEALTHEAST (OUTPATIENT)
Dept: ENDOCRINOLOGY | Facility: CLINIC | Age: 65
End: 2020-04-28

## 2020-04-28 DIAGNOSIS — E11.8 TYPE 2 DIABETES MELLITUS WITH COMPLICATION, WITH LONG-TERM CURRENT USE OF INSULIN (H): ICD-10-CM

## 2020-04-28 DIAGNOSIS — Z79.4 TYPE 2 DIABETES MELLITUS WITH COMPLICATION, WITH LONG-TERM CURRENT USE OF INSULIN (H): ICD-10-CM

## 2020-05-01 ENCOUNTER — COMMUNICATION - HEALTHEAST (OUTPATIENT)
Dept: ENDOCRINOLOGY | Facility: CLINIC | Age: 65
End: 2020-05-01

## 2020-05-04 ENCOUNTER — COMMUNICATION - HEALTHEAST (OUTPATIENT)
Dept: ENDOCRINOLOGY | Facility: CLINIC | Age: 65
End: 2020-05-04

## 2020-05-04 DIAGNOSIS — E11.8 TYPE 2 DIABETES MELLITUS WITH COMPLICATION, WITH LONG-TERM CURRENT USE OF INSULIN (H): ICD-10-CM

## 2020-05-04 DIAGNOSIS — Z79.4 TYPE 2 DIABETES MELLITUS WITH COMPLICATION, WITH LONG-TERM CURRENT USE OF INSULIN (H): ICD-10-CM

## 2020-05-16 ENCOUNTER — COMMUNICATION - HEALTHEAST (OUTPATIENT)
Dept: ENDOCRINOLOGY | Facility: CLINIC | Age: 65
End: 2020-05-16

## 2020-05-16 DIAGNOSIS — E11.9 DIABETES MELLITUS, TYPE 2 (H): ICD-10-CM

## 2020-05-16 DIAGNOSIS — E11.8 TYPE 2 DIABETES MELLITUS WITH COMPLICATION, WITH LONG-TERM CURRENT USE OF INSULIN (H): ICD-10-CM

## 2020-05-16 DIAGNOSIS — Z79.4 TYPE 2 DIABETES MELLITUS WITH COMPLICATION, WITH LONG-TERM CURRENT USE OF INSULIN (H): ICD-10-CM

## 2020-06-15 ENCOUNTER — COMMUNICATION - HEALTHEAST (OUTPATIENT)
Dept: ENDOCRINOLOGY | Facility: CLINIC | Age: 65
End: 2020-06-15

## 2020-06-15 DIAGNOSIS — E11.8 TYPE 2 DIABETES MELLITUS WITH COMPLICATION, WITH LONG-TERM CURRENT USE OF INSULIN (H): ICD-10-CM

## 2020-06-15 DIAGNOSIS — Z79.4 TYPE 2 DIABETES MELLITUS WITH COMPLICATION, WITH LONG-TERM CURRENT USE OF INSULIN (H): ICD-10-CM

## 2020-07-28 ENCOUNTER — COMMUNICATION - HEALTHEAST (OUTPATIENT)
Dept: INTERNAL MEDICINE | Facility: CLINIC | Age: 65
End: 2020-07-28

## 2020-08-21 ENCOUNTER — COMMUNICATION - HEALTHEAST (OUTPATIENT)
Dept: ENDOCRINOLOGY | Facility: CLINIC | Age: 65
End: 2020-08-21

## 2020-08-21 DIAGNOSIS — E11.9 DIABETES MELLITUS, TYPE 2 (H): ICD-10-CM

## 2020-09-03 ENCOUNTER — AMBULATORY - HEALTHEAST (OUTPATIENT)
Dept: ENDOCRINOLOGY | Facility: CLINIC | Age: 65
End: 2020-09-03

## 2020-09-03 DIAGNOSIS — Z79.4 TYPE 2 DIABETES MELLITUS WITH COMPLICATION, WITH LONG-TERM CURRENT USE OF INSULIN (H): ICD-10-CM

## 2020-09-03 DIAGNOSIS — E11.8 TYPE 2 DIABETES MELLITUS WITH COMPLICATION, WITH LONG-TERM CURRENT USE OF INSULIN (H): ICD-10-CM

## 2020-09-12 ENCOUNTER — COMMUNICATION - HEALTHEAST (OUTPATIENT)
Dept: INTERNAL MEDICINE | Facility: CLINIC | Age: 65
End: 2020-09-12

## 2020-09-12 DIAGNOSIS — F51.04 CHRONIC INSOMNIA: ICD-10-CM

## 2020-09-30 ENCOUNTER — COMMUNICATION - HEALTHEAST (OUTPATIENT)
Dept: ENDOCRINOLOGY | Facility: CLINIC | Age: 65
End: 2020-09-30

## 2020-09-30 DIAGNOSIS — E11.9 DIABETES MELLITUS, TYPE 2 (H): ICD-10-CM

## 2020-10-15 ENCOUNTER — AMBULATORY - HEALTHEAST (OUTPATIENT)
Dept: LAB | Facility: CLINIC | Age: 65
End: 2020-10-15

## 2020-10-15 ENCOUNTER — AMBULATORY - HEALTHEAST (OUTPATIENT)
Dept: INTERNAL MEDICINE | Facility: CLINIC | Age: 65
End: 2020-10-15

## 2020-10-15 ENCOUNTER — COMMUNICATION - HEALTHEAST (OUTPATIENT)
Dept: LAB | Facility: CLINIC | Age: 65
End: 2020-10-15

## 2020-10-15 DIAGNOSIS — Z79.4 TYPE 2 DIABETES MELLITUS WITH COMPLICATION, WITH LONG-TERM CURRENT USE OF INSULIN (H): ICD-10-CM

## 2020-10-15 DIAGNOSIS — E11.8 TYPE 2 DIABETES MELLITUS WITH COMPLICATION, WITH LONG-TERM CURRENT USE OF INSULIN (H): ICD-10-CM

## 2020-10-15 DIAGNOSIS — E03.9 HYPOTHYROIDISM, UNSPECIFIED TYPE: ICD-10-CM

## 2020-10-15 DIAGNOSIS — E03.9 HYPOTHYROIDISM: ICD-10-CM

## 2020-10-15 LAB
ANION GAP SERPL CALCULATED.3IONS-SCNC: 10 MMOL/L (ref 5–18)
BUN SERPL-MCNC: 18 MG/DL (ref 8–22)
CALCIUM SERPL-MCNC: 9.5 MG/DL (ref 8.5–10.5)
CHLORIDE BLD-SCNC: 102 MMOL/L (ref 98–107)
CHOLEST SERPL-MCNC: 124 MG/DL
CO2 SERPL-SCNC: 26 MMOL/L (ref 22–31)
CREAT SERPL-MCNC: 0.93 MG/DL (ref 0.6–1.1)
CREAT UR-MCNC: 430.8 MG/DL
GFR SERPL CREATININE-BSD FRML MDRD: >60 ML/MIN/1.73M2
GLUCOSE BLD-MCNC: 171 MG/DL (ref 70–125)
HBA1C MFR BLD: 7.8 %
HDLC SERPL-MCNC: 48 MG/DL
LDLC SERPL CALC-MCNC: 57 MG/DL
MICROALBUMIN UR-MCNC: 6.4 MG/DL (ref 0–1.99)
MICROALBUMIN/CREAT UR: 14.9 MG/G
POTASSIUM BLD-SCNC: 4.3 MMOL/L (ref 3.5–5)
SODIUM SERPL-SCNC: 138 MMOL/L (ref 136–145)
TRIGL SERPL-MCNC: 97 MG/DL
TSH SERPL DL<=0.005 MIU/L-ACNC: 0.28 UIU/ML (ref 0.3–5)

## 2020-10-16 ENCOUNTER — COMMUNICATION - HEALTHEAST (OUTPATIENT)
Dept: INTERNAL MEDICINE | Facility: CLINIC | Age: 65
End: 2020-10-16

## 2020-10-16 DIAGNOSIS — K21.9 GASTROESOPHAGEAL REFLUX DISEASE: ICD-10-CM

## 2020-10-19 ENCOUNTER — COMMUNICATION - HEALTHEAST (OUTPATIENT)
Dept: INTERNAL MEDICINE | Facility: CLINIC | Age: 65
End: 2020-10-19

## 2020-10-20 ENCOUNTER — COMMUNICATION - HEALTHEAST (OUTPATIENT)
Dept: INTERNAL MEDICINE | Facility: CLINIC | Age: 65
End: 2020-10-20

## 2020-10-20 ENCOUNTER — OFFICE VISIT - HEALTHEAST (OUTPATIENT)
Dept: INTERNAL MEDICINE | Facility: CLINIC | Age: 65
End: 2020-10-20

## 2020-10-20 DIAGNOSIS — Z78.0 POSTMENOPAUSAL STATUS: ICD-10-CM

## 2020-10-20 DIAGNOSIS — E11.8 TYPE 2 DIABETES MELLITUS WITH COMPLICATION, WITH LONG-TERM CURRENT USE OF INSULIN (H): ICD-10-CM

## 2020-10-20 DIAGNOSIS — E66.01 MORBID OBESITY (H): ICD-10-CM

## 2020-10-20 DIAGNOSIS — I10 ESSENTIAL HYPERTENSION, BENIGN: ICD-10-CM

## 2020-10-20 DIAGNOSIS — Z79.4 TYPE 2 DIABETES MELLITUS WITH COMPLICATION, WITH LONG-TERM CURRENT USE OF INSULIN (H): ICD-10-CM

## 2020-10-20 DIAGNOSIS — G47.33 OSA (OBSTRUCTIVE SLEEP APNEA): ICD-10-CM

## 2020-10-20 DIAGNOSIS — G47.00 INSOMNIA, UNSPECIFIED TYPE: ICD-10-CM

## 2020-10-20 DIAGNOSIS — E11.21 MICROALBUMINURIC DIABETIC NEPHROPATHY (H): ICD-10-CM

## 2020-10-20 DIAGNOSIS — E03.9 HYPOTHYROIDISM, UNSPECIFIED TYPE: ICD-10-CM

## 2020-10-20 DIAGNOSIS — Z00.00 ROUTINE GENERAL MEDICAL EXAMINATION AT A HEALTH CARE FACILITY: ICD-10-CM

## 2020-10-20 DIAGNOSIS — K21.9 GASTROESOPHAGEAL REFLUX DISEASE, UNSPECIFIED WHETHER ESOPHAGITIS PRESENT: ICD-10-CM

## 2020-10-20 ASSESSMENT — MIFFLIN-ST. JEOR: SCORE: 2183.83

## 2020-11-03 ENCOUNTER — OFFICE VISIT - HEALTHEAST (OUTPATIENT)
Dept: ENDOCRINOLOGY | Facility: CLINIC | Age: 65
End: 2020-11-03

## 2020-11-03 DIAGNOSIS — Z79.4 TYPE 2 DIABETES MELLITUS WITH COMPLICATION, WITH LONG-TERM CURRENT USE OF INSULIN (H): ICD-10-CM

## 2020-11-03 DIAGNOSIS — E11.9 DIABETES MELLITUS, TYPE 2 (H): ICD-10-CM

## 2020-11-03 DIAGNOSIS — E11.8 TYPE 2 DIABETES MELLITUS WITH COMPLICATION, WITH LONG-TERM CURRENT USE OF INSULIN (H): ICD-10-CM

## 2020-11-19 ENCOUNTER — COMMUNICATION - HEALTHEAST (OUTPATIENT)
Dept: ENDOCRINOLOGY | Facility: CLINIC | Age: 65
End: 2020-11-19

## 2020-11-19 DIAGNOSIS — E11.8 TYPE 2 DIABETES MELLITUS WITH COMPLICATION, WITH LONG-TERM CURRENT USE OF INSULIN (H): ICD-10-CM

## 2020-11-19 DIAGNOSIS — Z79.4 TYPE 2 DIABETES MELLITUS WITH COMPLICATION, WITH LONG-TERM CURRENT USE OF INSULIN (H): ICD-10-CM

## 2020-12-20 ENCOUNTER — HEALTH MAINTENANCE LETTER (OUTPATIENT)
Age: 65
End: 2020-12-20

## 2020-12-27 ENCOUNTER — OFFICE VISIT - HEALTHEAST (OUTPATIENT)
Dept: INTERNAL MEDICINE | Facility: CLINIC | Age: 65
End: 2020-12-27

## 2020-12-27 DIAGNOSIS — K21.9 GASTROESOPHAGEAL REFLUX DISEASE, UNSPECIFIED WHETHER ESOPHAGITIS PRESENT: ICD-10-CM

## 2020-12-28 ENCOUNTER — OFFICE VISIT - HEALTHEAST (OUTPATIENT)
Dept: INTERNAL MEDICINE | Facility: CLINIC | Age: 65
End: 2020-12-28

## 2020-12-28 DIAGNOSIS — K21.9 GASTROESOPHAGEAL REFLUX DISEASE, UNSPECIFIED WHETHER ESOPHAGITIS PRESENT: ICD-10-CM

## 2021-01-08 ENCOUNTER — COMMUNICATION - HEALTHEAST (OUTPATIENT)
Dept: INTERNAL MEDICINE | Facility: CLINIC | Age: 66
End: 2021-01-08

## 2021-01-08 DIAGNOSIS — I10 ESSENTIAL HYPERTENSION, BENIGN: ICD-10-CM

## 2021-01-28 ENCOUNTER — COMMUNICATION - HEALTHEAST (OUTPATIENT)
Dept: INTERNAL MEDICINE | Facility: CLINIC | Age: 66
End: 2021-01-28

## 2021-01-28 DIAGNOSIS — Z79.4 TYPE 2 DIABETES MELLITUS WITH COMPLICATION, WITH LONG-TERM CURRENT USE OF INSULIN (H): ICD-10-CM

## 2021-01-28 DIAGNOSIS — E11.8 TYPE 2 DIABETES MELLITUS WITH COMPLICATION, WITH LONG-TERM CURRENT USE OF INSULIN (H): ICD-10-CM

## 2021-01-28 DIAGNOSIS — E03.9 ACQUIRED HYPOTHYROIDISM: ICD-10-CM

## 2021-02-05 ENCOUNTER — RECORDS - HEALTHEAST (OUTPATIENT)
Dept: ADMINISTRATIVE | Facility: OTHER | Age: 66
End: 2021-02-05

## 2021-02-05 ENCOUNTER — TRANSFERRED RECORDS (OUTPATIENT)
Dept: HEALTH INFORMATION MANAGEMENT | Facility: CLINIC | Age: 66
End: 2021-02-05
Payer: COMMERCIAL

## 2021-02-05 LAB — COLOGUARD-ABSTRACT: POSITIVE

## 2021-03-03 ENCOUNTER — COMMUNICATION - HEALTHEAST (OUTPATIENT)
Dept: INTERNAL MEDICINE | Facility: CLINIC | Age: 66
End: 2021-03-03

## 2021-03-03 DIAGNOSIS — R19.5 POSITIVE COLORECTAL CANCER SCREENING USING COLOGUARD TEST: ICD-10-CM

## 2021-03-05 ENCOUNTER — COMMUNICATION - HEALTHEAST (OUTPATIENT)
Dept: INTERNAL MEDICINE | Facility: CLINIC | Age: 66
End: 2021-03-05

## 2021-03-05 DIAGNOSIS — F51.04 CHRONIC INSOMNIA: ICD-10-CM

## 2021-03-14 ENCOUNTER — AMBULATORY - HEALTHEAST (OUTPATIENT)
Dept: SURGERY | Facility: CLINIC | Age: 66
End: 2021-03-14

## 2021-03-14 DIAGNOSIS — Z11.59 ENCOUNTER FOR SCREENING FOR OTHER VIRAL DISEASES: ICD-10-CM

## 2021-03-29 ENCOUNTER — COMMUNICATION - HEALTHEAST (OUTPATIENT)
Dept: ENDOCRINOLOGY | Facility: CLINIC | Age: 66
End: 2021-03-29

## 2021-03-29 DIAGNOSIS — E11.9 DIABETES MELLITUS, TYPE 2 (H): ICD-10-CM

## 2021-03-29 DIAGNOSIS — Z79.4 TYPE 2 DIABETES MELLITUS WITH COMPLICATION, WITH LONG-TERM CURRENT USE OF INSULIN (H): ICD-10-CM

## 2021-03-29 DIAGNOSIS — E11.8 TYPE 2 DIABETES MELLITUS WITH COMPLICATION, WITH LONG-TERM CURRENT USE OF INSULIN (H): ICD-10-CM

## 2021-04-08 ENCOUNTER — AMBULATORY - HEALTHEAST (OUTPATIENT)
Dept: NURSING | Facility: CLINIC | Age: 66
End: 2021-04-08

## 2021-04-08 ENCOUNTER — COMMUNICATION - HEALTHEAST (OUTPATIENT)
Dept: INTERNAL MEDICINE | Facility: CLINIC | Age: 66
End: 2021-04-08

## 2021-04-20 ENCOUNTER — OFFICE VISIT - HEALTHEAST (OUTPATIENT)
Dept: INTERNAL MEDICINE | Facility: CLINIC | Age: 66
End: 2021-04-20

## 2021-04-20 DIAGNOSIS — Z79.4 TYPE 2 DIABETES MELLITUS WITH COMPLICATION, WITH LONG-TERM CURRENT USE OF INSULIN (H): ICD-10-CM

## 2021-04-20 DIAGNOSIS — R19.5 POSITIVE COLORECTAL CANCER SCREENING USING COLOGUARD TEST: ICD-10-CM

## 2021-04-20 DIAGNOSIS — G47.33 OSA (OBSTRUCTIVE SLEEP APNEA): ICD-10-CM

## 2021-04-20 DIAGNOSIS — E11.8 TYPE 2 DIABETES MELLITUS WITH COMPLICATION, WITH LONG-TERM CURRENT USE OF INSULIN (H): ICD-10-CM

## 2021-04-20 DIAGNOSIS — I10 ESSENTIAL HYPERTENSION, BENIGN: ICD-10-CM

## 2021-04-20 DIAGNOSIS — Z01.818 PREOP GENERAL PHYSICAL EXAM: ICD-10-CM

## 2021-04-20 DIAGNOSIS — E03.9 HYPOTHYROIDISM, UNSPECIFIED TYPE: ICD-10-CM

## 2021-04-20 LAB
ATRIAL RATE - MUSE: 93 BPM
DIASTOLIC BLOOD PRESSURE - MUSE: NORMAL
INTERPRETATION ECG - MUSE: NORMAL
P AXIS - MUSE: 31 DEGREES
PR INTERVAL - MUSE: 148 MS
QRS DURATION - MUSE: 82 MS
QT - MUSE: 338 MS
QTC - MUSE: 420 MS
R AXIS - MUSE: 57 DEGREES
SYSTOLIC BLOOD PRESSURE - MUSE: NORMAL
T AXIS - MUSE: 31 DEGREES
VENTRICULAR RATE- MUSE: 93 BPM

## 2021-04-20 ASSESSMENT — MIFFLIN-ST. JEOR: SCORE: 2179.29

## 2021-04-22 ENCOUNTER — COMMUNICATION - HEALTHEAST (OUTPATIENT)
Dept: LAB | Facility: CLINIC | Age: 66
End: 2021-04-22

## 2021-04-22 DIAGNOSIS — Z79.4 TYPE 2 DIABETES MELLITUS WITH COMPLICATION, WITH LONG-TERM CURRENT USE OF INSULIN (H): ICD-10-CM

## 2021-04-22 DIAGNOSIS — E11.8 TYPE 2 DIABETES MELLITUS WITH COMPLICATION, WITH LONG-TERM CURRENT USE OF INSULIN (H): ICD-10-CM

## 2021-04-24 ENCOUNTER — COMMUNICATION - HEALTHEAST (OUTPATIENT)
Dept: ENDOCRINOLOGY | Facility: CLINIC | Age: 66
End: 2021-04-24

## 2021-04-24 ENCOUNTER — HEALTH MAINTENANCE LETTER (OUTPATIENT)
Age: 66
End: 2021-04-24

## 2021-04-26 ENCOUNTER — COMMUNICATION - HEALTHEAST (OUTPATIENT)
Dept: INTERNAL MEDICINE | Facility: CLINIC | Age: 66
End: 2021-04-26

## 2021-04-26 DIAGNOSIS — E55.9 VITAMIN D DEFICIENCY: ICD-10-CM

## 2021-04-27 ENCOUNTER — COMMUNICATION - HEALTHEAST (OUTPATIENT)
Dept: ENDOCRINOLOGY | Facility: CLINIC | Age: 66
End: 2021-04-27

## 2021-04-27 DIAGNOSIS — E11.8 TYPE 2 DIABETES MELLITUS WITH COMPLICATION, WITH LONG-TERM CURRENT USE OF INSULIN (H): ICD-10-CM

## 2021-04-27 DIAGNOSIS — Z79.4 TYPE 2 DIABETES MELLITUS WITH COMPLICATION, WITH LONG-TERM CURRENT USE OF INSULIN (H): ICD-10-CM

## 2021-04-29 ENCOUNTER — RECORDS - HEALTHEAST (OUTPATIENT)
Dept: ADMINISTRATIVE | Facility: OTHER | Age: 66
End: 2021-04-29

## 2021-04-29 ENCOUNTER — AMBULATORY - HEALTHEAST (OUTPATIENT)
Dept: NURSING | Facility: CLINIC | Age: 66
End: 2021-04-29

## 2021-04-29 ENCOUNTER — AMBULATORY - HEALTHEAST (OUTPATIENT)
Dept: LAB | Facility: CLINIC | Age: 66
End: 2021-04-29

## 2021-04-29 DIAGNOSIS — E11.8 TYPE 2 DIABETES MELLITUS WITH COMPLICATION, WITH LONG-TERM CURRENT USE OF INSULIN (H): ICD-10-CM

## 2021-04-29 DIAGNOSIS — Z79.4 TYPE 2 DIABETES MELLITUS WITH COMPLICATION, WITH LONG-TERM CURRENT USE OF INSULIN (H): ICD-10-CM

## 2021-04-29 LAB
ANION GAP SERPL CALCULATED.3IONS-SCNC: 13 MMOL/L (ref 5–18)
BUN SERPL-MCNC: 21 MG/DL (ref 8–22)
CALCIUM SERPL-MCNC: 9.7 MG/DL (ref 8.5–10.5)
CHLORIDE BLD-SCNC: 99 MMOL/L (ref 98–107)
CO2 SERPL-SCNC: 25 MMOL/L (ref 22–31)
CREAT SERPL-MCNC: 0.98 MG/DL (ref 0.6–1.1)
GFR SERPL CREATININE-BSD FRML MDRD: 57 ML/MIN/1.73M2
GLUCOSE BLD-MCNC: 187 MG/DL (ref 70–125)
HBA1C MFR BLD: 7.5 %
POTASSIUM BLD-SCNC: 5.1 MMOL/L (ref 3.5–5)
SODIUM SERPL-SCNC: 137 MMOL/L (ref 136–145)

## 2021-04-29 ASSESSMENT — MIFFLIN-ST. JEOR: SCORE: 2179.29

## 2021-04-30 ENCOUNTER — AMBULATORY - HEALTHEAST (OUTPATIENT)
Dept: LAB | Facility: CLINIC | Age: 66
End: 2021-04-30

## 2021-04-30 ENCOUNTER — COMMUNICATION - HEALTHEAST (OUTPATIENT)
Dept: ENDOCRINOLOGY | Facility: CLINIC | Age: 66
End: 2021-04-30

## 2021-04-30 DIAGNOSIS — Z11.59 ENCOUNTER FOR SCREENING FOR OTHER VIRAL DISEASES: ICD-10-CM

## 2021-05-01 LAB
SARS-COV-2 PCR COMMENT: NORMAL
SARS-COV-2 RNA SPEC QL NAA+PROBE: NEGATIVE
SARS-COV-2 VIRUS SPECIMEN SOURCE: NORMAL

## 2021-05-02 ENCOUNTER — COMMUNICATION - HEALTHEAST (OUTPATIENT)
Dept: SCHEDULING | Facility: CLINIC | Age: 66
End: 2021-05-02

## 2021-05-04 ENCOUNTER — RECORDS - HEALTHEAST (OUTPATIENT)
Dept: ADMINISTRATIVE | Facility: OTHER | Age: 66
End: 2021-05-04

## 2021-05-04 ENCOUNTER — ANESTHESIA - HEALTHEAST (OUTPATIENT)
Dept: SURGERY | Facility: CLINIC | Age: 66
End: 2021-05-04

## 2021-05-04 ENCOUNTER — SURGERY - HEALTHEAST (OUTPATIENT)
Dept: SURGERY | Facility: CLINIC | Age: 66
End: 2021-05-04
Payer: COMMERCIAL

## 2021-05-04 ASSESSMENT — MIFFLIN-ST. JEOR: SCORE: 2179.29

## 2021-05-12 ENCOUNTER — COMMUNICATION - HEALTHEAST (OUTPATIENT)
Dept: INTERNAL MEDICINE | Facility: CLINIC | Age: 66
End: 2021-05-12
Payer: COMMERCIAL

## 2021-05-19 ENCOUNTER — RECORDS - HEALTHEAST (OUTPATIENT)
Dept: ENDOCRINOLOGY | Facility: CLINIC | Age: 66
End: 2021-05-19

## 2021-05-20 ENCOUNTER — COMMUNICATION - HEALTHEAST (OUTPATIENT)
Dept: INTERNAL MEDICINE | Facility: CLINIC | Age: 66
End: 2021-05-20

## 2021-05-20 DIAGNOSIS — Z98.84 BARIATRIC SURGERY STATUS: ICD-10-CM

## 2021-05-25 ENCOUNTER — COMMUNICATION - HEALTHEAST (OUTPATIENT)
Dept: ENDOCRINOLOGY | Facility: CLINIC | Age: 66
End: 2021-05-25

## 2021-05-25 DIAGNOSIS — Z79.4 TYPE 2 DIABETES MELLITUS WITH COMPLICATION, WITH LONG-TERM CURRENT USE OF INSULIN (H): ICD-10-CM

## 2021-05-25 DIAGNOSIS — E11.8 TYPE 2 DIABETES MELLITUS WITH COMPLICATION, WITH LONG-TERM CURRENT USE OF INSULIN (H): ICD-10-CM

## 2021-05-26 NOTE — TELEPHONE ENCOUNTER
Refill Approved    Rx renewed per Medication Renewal Policy. Medication was last renewed on 1/24/2019.  Last OV 2/25/2019.    Katie Denney, [facility] [place] Connection Triage/Med Refill 3/22/2019     Requested Prescriptions   Pending Prescriptions Disp Refills     fluticasone (FLONASE) 50 mcg/actuation nasal spray [Pharmacy Med Name: FLUTICASONE 50MCG NASAL SP (120) RX]  0     Sig: SHAKE LIQUID AND USE 2 SPRAYS IN EACH NOSTRIL DAILY    Nasal Steroid Refill Protocol Passed - 3/20/2019  3:14 AM       Passed - Patient has had office visit/physical in last 2 years    Last office visit with prescriber/PCP: 2/25/2019 OR same dept: 2/25/2019 Masood Melton MD OR same specialty: 2/25/2019 Masood Melton MD Last physical: Visit date not found Last MTM visit: Visit date not found    Next appt within 3 mo: Visit date not found  Next physical within 3 mo: Visit date not found  Prescriber OR PCP: Masood Melton MD  Last diagnosis associated with med order: There are no diagnoses linked to this encounter.   If protocol passes may refill for 12 months if within 3 months of last provider visit (or a total of 15 months).

## 2021-05-27 VITALS — BODY MASS INDEX: 59.74 KG/M2 | WEIGHT: 293 LBS | HEIGHT: 65 IN | BODY MASS INDEX: 39.95 KG/M2

## 2021-05-27 NOTE — PROGRESS NOTES
[facility], [place]  ENDOCRINOLOGY    Diabetes Note 3/27/2019    Corinna Farias, 1955, [id number]          Reason for visit      1. Type 2 diabetes mellitus with complication, with long-term current use of insulin (H)        HPI     Corinna Farias is a very pleasant 63 y.o. old female who presents for follow up.  SUMMARY:  Corinna returns today in f/u for DM 2.  She has had some obstacles to getting back here in f/u and consequently, her last appointment was over a year ago.  She is currently Homeless and is at the [facility].  She is hopeful that she and her  will be out and into more permanent shelter soon.  Her A1c is now 6.9. The last that we have on file is 6.2.  She continues to use a T-Slim insulin pump and is awaiting a Dexcom to use with it.  She feels that she is doing the best that she can be doing, all things considered.  She states that she made a change in her Basal settings recently, but they are the same as the last time that she was here. We are unable to download the T-Slim, unfortunately.  Her glucometer download shows that She had an ave BG of 169 over the last two weeks with 2.4 readings a day.  She had one episode of hypoglycemia for which she needed no assistance.      Insulin Pump Settings     Basal Rate  TIME Units/HR   0000 - 0300 1.4   0300 - 0600 1.5   0600 - 0900 1.4   0900 - 2200 1.35   2200 - 0000 1.30         Bolus: Insulin : CHO ratio  Time Units Grams CHO   0000 1 9                                      Correction  #Units Blood glucose >Target BG   1 35 110           Past Medical History     Patient Active Problem List   Diagnosis     Type 2 diabetes mellitus with complication, with long-term current use of insulin (H)     GIOVANI (obstructive sleep apnea)     Morbid obesity (H)     Hypothyroid     Vitamin D deficiency     GERD (gastroesophageal reflux disease)     Essential hypertension, benign        Family History       family history is not on file.    Social History  "     reports that  has never smoked. she has never used smokeless tobacco. She reports that she does not drink alcohol or use drugs.      Review of Systems     Patient has no polyuria or polydipsia, no chest pain, dyspnea or TIA's, no numbness, tingling or pain in extremities  Remainder negative except as noted in HPI.    Vital Signs     /76 (Patient Site: Right Arm, Patient Position: Sitting, Cuff Size: Adult Large)   Pulse 78   Ht 5' 5\" (1.651 m)   Wt (!) 308 lb 11.2 oz (140 kg)   BMI 51.37 kg/m    Wt Readings from Last 3 Encounters:   03/26/19 (!) 308 lb 11.2 oz (140 kg)   02/25/19 (!) 307 lb (139.3 kg)   01/24/19 (!) 306 lb (138.8 kg)       Physical Exam     Constitutional:  Well developed, Well nourished  HENT:  Normocephalic,   Neck: Thyroid normal, No lymph nodes, Supple  Eyes:  PERRL, Conjunctiva pink  Respiratory:  Normal breath sounds, No respiratory distress  Cardiovascular:  Normal heart rate, Normal rhythm, No murmurs  GI:  Bowel sounds normal, Soft, No tenderness  Musculoskeletal:  No gross deformity or lesions, normal dorsalis pedis pulses  Skin: No acanthosis nigricans, lipoatrophy or lipodystrophy  Neurologic:  Alert & oriented x 3, nonfocal  Psychiatric:  Affect, Mood, Insight appropriate  Diabetic foot exam: no ulcers, charcot's or high risk calluses, Normal monofilament exam          Assessment     1. Type 2 diabetes mellitus with complication, with long-term current use of insulin (H)        Plan     Corinna has lost a bit of ground since our last visit, but is still below goal of 7.  No changes to her settings warranted.  She is hopeful that she will be able to keep her upcoming follow up appointment without difficulty.  She will continue with her pump and add the Dexcom soon. Time spent with pt today: 25 min with >50% spent in counseling and coordination of care.      Rossy LESLIE Endocrinology  3/27/2019  2:20 PM      Lab Results     Hemoglobin A1c   Date Value Ref Range " "Status   03/25/2019 6.9 (H) 3.5 - 6.0 % Final   01/24/2019 6.9 (H) 3.5 - 6.0 % Final   06/25/2018 6.2 (H) 3.5 - 6.0 % Final   01/17/2018 6.5 (H) 3.5 - 6.0 % Final     Creatinine   Date Value Ref Range Status   02/25/2019 0.85 0.60 - 1.10 mg/dL Final   01/24/2019 0.96 0.60 - 1.10 mg/dL Final   07/24/2018 0.86 0.60 - 1.10 mg/dL Final     Microalbumin, Random Urine   Date Value Ref Range Status   06/25/2018 1.16 0.00 - 1.99 mg/dL Final       Cholesterol   Date Value Ref Range Status   02/25/2019 125 <=199 mg/dL Final     HDL Cholesterol   Date Value Ref Range Status   02/25/2019 51 >=50 mg/dL Final     LDL Calculated   Date Value Ref Range Status   02/25/2019 61 <=129 mg/dL Final     Triglycerides   Date Value Ref Range Status   02/25/2019 67 <=149 mg/dL Final       Lab Results   Component Value Date    ALT 13 02/25/2019    AST 17 02/25/2019    ALKPHOS 102 02/25/2019    BILITOT 0.8 02/25/2019         Current Medications     Outpatient Medications Prior to Visit   Medication Sig Dispense Refill     ACCU-CHEK SOFTCLIX LANCETS lancets USE TO TEST QID  11     atorvastatin (LIPITOR) 20 MG tablet Take 1 tablet by mouth daily.  0     BD ULTRA-FINE BRANDEN PEN NEEDLES 32 gauge x 5/32\" Ndle Used as directed       cetirizine (ZYRTEC) 10 MG tablet Take 1 tablet (10 mg total) by mouth daily. 30 tablet 2     cholecalciferol, vitamin D3, (VITAMIN D3) 2,000 unit Tab Take 1 tablet (2,000 Units total) by mouth daily. 90 tablet 3     DAILY VITES/IRON Tab tablet Take 1 tablet by mouth daily. 90 tablet 3     levothyroxine (SYNTHROID, LEVOTHROID) 75 MCG tablet TK ONE T D AT LEAST ONE HOUR BEFORE OR TWO HOURS AFTER A MEAL  11     lisinopril (PRINIVIL,ZESTRIL) 20 MG tablet Take 1 tablet (20 mg total) by mouth daily. 90 tablet 1     NOVOLOG FLEXPEN 100 unit/mL injection pen INJECT 1 UNIT PER 7GM CARBS PLUS CORRECTION UP TO 60 UNITS D; IF INSULIN PUMP FAILS  4     NOVOLOG U-100 INSULIN ASPART 100 unit/mL injection ADM UP  UNI VIA INSULIN " PUMP DAILY. 30 mL 11     ONETOUCH DELICA LANCETS 33 gauge Misc USE TO TEST QID  7     ONETOUCH ULTRA TEST strips Test 5-6x daily  8     pantoprazole (PROTONIX) 40 MG tablet Take 1 tablet by mouth daily.  3     traZODone (DESYREL) 100 MG tablet TK 1 T PO HS PRF SLP  3     fluticasone (FLONASE) 50 mcg/actuation nasal spray SHAKE LIQUID AND USE 2 SPRAYS IN EACH NOSTRIL DAILY 16 g 3     VICTOZA 3-CAROLA 0.6 mg/0.1 mL (18 mg/3 mL) PnIj injection ADM 1.8 MG SC D  11     NYSTOP powder KOJO TO SKIN FOLDS RASH D PRN  5     No facility-administered medications prior to visit.

## 2021-05-27 NOTE — TELEPHONE ENCOUNTER
[facility] need to reschedule to later in the afternoon. Held time at 1:20 for patient.

## 2021-05-27 NOTE — TELEPHONE ENCOUNTER
Kamille Team    In regards of: VICTOZA 3-CAROLA 0.6 mg/0.1 mL (18 mg/3 mL) PnIj injection    [id number] - [place], [place] - [address] [place] [address] AT [facility] [address]

## 2021-05-27 NOTE — TELEPHONE ENCOUNTER
Please call patient to reschedule upcoming appointment.  She was double-booked and the other patient had scheduled first.

## 2021-05-28 NOTE — TELEPHONE ENCOUNTER
Yes, because it works and she is doing well on it!  What are the alternatives (that won't even be in the same class of medications)

## 2021-05-30 NOTE — TELEPHONE ENCOUNTER
Refill Approved    Rx renewed per Medication Renewal Policy. Medication was last renewed on 1/24/19.    Jonelle Cotto, [facility] Triage/Med Refill 7/29/2019     Requested Prescriptions   Pending Prescriptions Disp Refills     lisinopril (PRINIVIL,ZESTRIL) 20 MG tablet [Pharmacy Med Name: LISINOPRIL 20MG TABLETS] 90 tablet 0     Sig: TAKE ONE TABLET DAILY       [employer]ce Inhibitors Refill Protocol Passed - 7/29/2019  5:04 AM        Passed - PCP or prescribing provider visit in past 12 months       Last office visit with prescriber/PCP: 2/25/2019 Masood Melton MD OR same dept: 2/25/2019 Masood Melton MD OR same specialty: 2/25/2019 Masood Melton MD  Last physical: Visit date not found Last MTM visit: Visit date not found   Next visit within 3 mo: Visit date not found  Next physical within 3 mo: Visit date not found  Prescriber OR PCP: Masood Melton MD  Last diagnosis associated with med order: There are no diagnoses linked to this encounter.  If protocol passes may refill for 12 months if within 3 months of last provider visit (or a total of 15 months).             Passed - Serum Potassium in past 12 months     Lab Results   Component Value Date    Potassium 3.5 02/25/2019             Passed - Blood pressure filed in past 12 months     BP Readings from Last 1 Encounters:   03/26/19 134/76             Passed - Serum Creatinine in past 12 months     Creatinine   Date Value Ref Range Status   02/25/2019 0.85 0.60 - 1.10 mg/dL Final

## 2021-05-31 VITALS — WEIGHT: 293 LBS | BODY MASS INDEX: 48.82 KG/M2 | HEIGHT: 65 IN

## 2021-05-31 NOTE — PROGRESS NOTES
Office Visit - Follow Up   Corinna Farias   63 y.o. female    Date of Visit: 8/5/2019    Chief Complaint   Patient presents with     Rash     overall body (chest region and arm) dry & intermittent itchiness - not using anything otc         Assessment and Plan   1. Rash  Treat with triamcinolone for the itching and ketoconazole for the dermatophyte.  Call if things are not improving.  - triamcinolone (KENALOG) 0.1 % cream; Apply two times a day for no longer than 2 weeks.  Dispense: 30 g; Refill: 0  - ketoconazole (NIZORAL) 2 % cream; Apply two times a day until rash clears.  Dispense: 60 g; Refill: 1    2. Gastroesophageal reflux disease, esophagitis presence not specified  She has been having poorly controlled reflux off of pantoprazole.  Will prescribe represcribed.  - pantoprazole (PROTONIX) 40 MG tablet; Take 1 tablet (40 mg total) by mouth daily.  Dispense: 90 tablet; Refill: 1    3. Type 2 diabetes mellitus with complication, with long-term current use of insulin (H)  Excellent control per patient report.  - Comprehensive Metabolic Panel; Future  - Lipid Cascade; Future    4. Morbid obesity (H)  She is gained considerable weight.  Hopefully she can work on losing some of that.    5. Acquired hypothyroidism  TSH will be done at her next visit.  - Thyroid Cascade; Future    Patient reports she would like to have a cat for emotional support.  She needs a letter stating it is medically necessary.  I did discuss with her that cat is not going to be able to help her physically at all.  If she feels that this emotionally supportive I am happy to write something like that but it is not a service animal and I discussed this with her.    Return in about 6 months (around 2/5/2020) for Recheck.     History of Present Illness   This 63 y.o. old morbidly obese woman with diabetes currently well controlled with the pump comes in today for follow-up and for evaluation of rash.  She is had a rash that she developed while in  "the homeless shelter.  Pruritic at times.  Mainly under her breasts on her abdomen and she has some on her arm and groin as well.  She has not been using anything for it.  She got moved into more permanent housing.  She is living over on [place] near the [facility].  She is very pleased about that.  Her last A1c was excellent.    Review of Systems: A comprehensive review of systems was negative except as noted.     Medications, Allergies and Problem List   Reviewed, reconciled and updated  Post Discharge Medication Reconciliation Status:      Physical Exam   General Appearance:   Morbidly obese pleasant woman in no distress.  She has dermatophyte appearing rash over her right arm upper abdomen etc.    /80 (Patient Site: Right Arm, Patient Position: Sitting, Cuff Size: Adult Regular)   Pulse 76   Ht 5' 5\" (1.651 m)   Wt (!) 321 lb (145.6 kg)   SpO2 96%   BMI 53.42 kg/m           Additional Information   Current Outpatient Medications   Medication Sig Dispense Refill     ACCU-CHEK SOFTCLIX LANCETS lancets USE TO TEST QID  11     atorvastatin (LIPITOR) 20 MG tablet Take 1 tablet by mouth daily.  0     BD ULTRA-FINE BRANDEN PEN NEEDLES 32 gauge x 5/32\" Ndle Used as directed       cholecalciferol, vitamin D3, (VITAMIN D3) 2,000 unit Tab Take 1 tablet (2,000 Units total) by mouth daily. 90 tablet 3     DAILY VITES/IRON Tab tablet Take 1 tablet by mouth daily. 90 tablet 3     levothyroxine (SYNTHROID, LEVOTHROID) 75 MCG tablet TK ONE T D AT LEAST ONE HOUR BEFORE OR TWO HOURS AFTER A MEAL  11     lisinopril (PRINIVIL,ZESTRIL) 20 MG tablet Take 1 tablet (20 mg total) by mouth daily. 90 tablet 1     NOVOLOG FLEXPEN 100 unit/mL injection pen INJECT 1 UNIT PER 7GM CARBS PLUS CORRECTION UP TO 60 UNITS D; IF INSULIN PUMP FAILS  4     NOVOLOG U-100 INSULIN ASPART 100 unit/mL injection ADM UP  UNI VIA INSULIN PUMP DAILY. 30 mL 11     traZODone (DESYREL) 100 MG tablet TK 1 T PO HS PRF SLP  3     ketoconazole " (NIZORAL) 2 % cream Apply two times a day until rash clears. 60 g 1     pantoprazole (PROTONIX) 40 MG tablet Take 1 tablet (40 mg total) by mouth daily. 90 tablet 1     triamcinolone (KENALOG) 0.1 % cream Apply two times a day for no longer than 2 weeks. 30 g 0     No current facility-administered medications for this visit.      No Known Allergies  Social History     Tobacco Use     Smoking status: Never Smoker     Smokeless tobacco: Never Used   Substance Use Topics     Alcohol use: No     Drug use: No       Review and/or order of clinical lab tests:  Review and/or order of radiology tests:  Review and/or order of medicine tests:  Discussion of test results with performing physician:  Decision to obtain old records and/or obtain history from someone other than the patient:  Review and summarization of old records and/or obtaining history from someone other than the patient and.or discussion of case with another health care provider:  Independent visualization of image, tracing or specimen itself:    Time: not applicable     Masood Melton MD

## 2021-06-01 VITALS — HEIGHT: 65 IN | BODY MASS INDEX: 48.82 KG/M2 | WEIGHT: 293 LBS

## 2021-06-01 VITALS — HEIGHT: 65 IN | WEIGHT: 293 LBS | BODY MASS INDEX: 48.82 KG/M2

## 2021-06-01 VITALS — WEIGHT: 293 LBS | BODY MASS INDEX: 48.82 KG/M2 | HEIGHT: 65 IN

## 2021-06-01 NOTE — TELEPHONE ENCOUNTER
Date: 10/9/2019 Status: [facility]   Time: 10:00 AM Length: 10   Visit Type: LAB [0879809] Copay: $0.00   Provider: [facility] Department: [facility]   Referring Provider: MAGALI JEAN-BAPTISTE CSN: [id number]   Notes: labs per juan carlos buchanan

## 2021-06-01 NOTE — TELEPHONE ENCOUNTER
Refill Approved    Rx renewed per Medication Renewal Policy. Medication was last renewed on 9/13/19.    Whit Fuentes, [facility] Connection Triage/Med Refill 9/27/2019     Requested Prescriptions   Pending Prescriptions Disp Refills     traZODone (DESYREL) 100 MG tablet [Pharmacy Med Name: TRAZODONE 100MG TABLETS] 30 tablet 0     Sig: TAKE 1 TABLET BY MOUTH AT BEDTIME AS NEEDED FOR SLEEP       Tricyclics/Misc Antidepressant/Antianxiety Meds Refill Protocol Passed - 9/27/2019  5:04 AM        Passed - PCP or prescribing provider visit in last year     Last office visit with prescriber/PCP: Visit date not found OR same dept: 8/5/2019 Masood Melton MD OR same specialty: 8/5/2019 Masood Melton MD  Last physical: Visit date not found Last MTM visit: Visit date not found   Next visit within 3 mo: Visit date not found  Next physical within 3 mo: Visit date not found  Prescriber OR PCP: Yang Patino MD  Last diagnosis associated with med order: 1. Chronic insomnia  - traZODone (DESYREL) 100 MG tablet [Pharmacy Med Name: TRAZODONE 100MG TABLETS]; TAKE 1 TABLET BY MOUTH AT BEDTIME AS NEEDED FOR SLEEP  Dispense: 30 tablet; Refill: 0    If protocol passes may refill for 12 months if within 3 months of last provider visit (or a total of 15 months).

## 2021-06-01 NOTE — TELEPHONE ENCOUNTER
Who is calling:  Liss from [employer]  Reason for Call:  This company provides patient her diabetic supplies.  They need the last office note on patient faxed to them.  Fax # [phone].  Date of last appointment with primary care: 8/5/19  Okay to leave a detailed message: Yes

## 2021-06-01 NOTE — TELEPHONE ENCOUNTER
Called patient to report that she is overdue for an Annual diabetic eye examine. Patient reports that she is aware of this and would like a referral to ensure there's no changes with vision. Referral enter in Epic to be co-sign.

## 2021-06-01 NOTE — TELEPHONE ENCOUNTER
Kamille team    Please refill: Accu chek Guide test strips    Testing up to: 6 times a day    Please send as a 30 day supply    Please send to: [id number] - [place], [place] - [address] [place] [address] AT [facility] [address]

## 2021-06-01 NOTE — TELEPHONE ENCOUNTER
Medication Request  Medication name:   traZODone (DESYREL) 100 MG tablet  3 12/26/2017     Sig: TK 1 T PO HS PRF SLP    Class: Historical Med        [id number] - [place], [place] - [address] AT [facility] [address]  Reason for request: I need this for sleeping and it was originally ordered by my prior [facility] provider.  When did you use medication last?:  Last night  Patient offered appointment:  patient declined I was just seen.   Okay to leave a detailed message: yes

## 2021-06-02 VITALS — BODY MASS INDEX: 48.82 KG/M2 | WEIGHT: 293 LBS | HEIGHT: 65 IN

## 2021-06-02 VITALS — HEIGHT: 65 IN | BODY MASS INDEX: 48.82 KG/M2 | WEIGHT: 293 LBS

## 2021-06-02 NOTE — TELEPHONE ENCOUNTER
Please call patient to schedule labs at least a week prior to next visit in April.  That was missed at today's appt.

## 2021-06-02 NOTE — PROGRESS NOTES
[facility]  ENDOCRINOLOGY    Diabetes Note 10/23/2019    Corinna Farias, 1955, [id number]          Reason for visit      1. Type 2 diabetes mellitus with complication, with long-term current use of insulin (H)        HPI     Corinna Farias is a very pleasant 64 y.o. old female who presents for follow up.  SUMMARY:    Corinna returns today in f/u for DM 2. Her current A1c is 7.7 and up considerably from her last at 6.8. She reports that she was without her CGM for about a month and this greatly inhibited her ability to self manage. She is currently using the Tandem system and we have no ability to download this. Her glucometer shows readings of 3 times a day. The preponderance of the readings are in the low to mid 100s.  She had one elevation of 244 and one low of 72She notes today that she is in the midst of some hypoglycemia and we have provided some orange juice for her.  She has been able to find an apartment with the help of , and she and her  have moved out of the [facility].     Blood glucose data:  Unavailable    Past Medical History     Patient Active Problem List   Diagnosis     Type 2 diabetes mellitus with complication, with long-term current use of insulin (H)     GIOVANI (obstructive sleep apnea)     Morbid obesity (H)     Hypothyroid     Vitamin D deficiency     GERD (gastroesophageal reflux disease)     Essential hypertension, benign     Bariatric surgery status     Cervical cancer screening     Female climacteric state     Insomnia     Insulin pump status     Pain in joint involving lower leg     Pseudophakia        Family History       family history is not on file.    Social History      reports that she has never smoked. She has never used smokeless tobacco. She reports that she does not drink alcohol or use drugs.      Review of Systems     Patient has no polyuria or polydipsia, no chest pain, dyspnea or TIA's, no numbness, tingling or pain in  extremities  Remainder negative except as noted in HPI.    Vital Signs     /70 (Patient Site: Right Arm, Patient Position: Sitting, Cuff Size: Adult Regular)   Pulse 88   Wt (!) 340 lb (154.2 kg)   BMI 56.58 kg/m    Wt Readings from Last 3 Encounters:   10/22/19 (!) 340 lb (154.2 kg)   08/05/19 (!) 321 lb (145.6 kg)   03/26/19 (!) 308 lb 11.2 oz (140 kg)       Physical Exam     Constitutional:  Well developed, Well nourished  HENT:  Normocephalic,   Neck: Thyroid normal, No lymph nodes, Supple  Eyes:  PERRL, Conjunctiva pink  Respiratory:  Normal breath sounds, No respiratory distress  Cardiovascular:  Normal heart rate, Normal rhythm, No murmurs  GI:  Bowel sounds normal, Soft, No tenderness  Musculoskeletal:  No gross deformity or lesions, normal dorsalis pedis pulses  Skin: No acanthosis nigricans, lipoatrophy or lipodystrophy  Neurologic:  Alert & oriented x 3, nonfocal  Psychiatric:  Affect, Mood, Insight appropriate  Diabetic foot exam: no ulcers, charcot's or high risk calluses, Normal monofilament exam        Assessment     1. Type 2 diabetes mellitus with complication, with long-term current use of insulin (H)        Plan     It's unfortunate that we are unable to interrogate any of her equipment today. She feels secure in her current settings, however, so we will leave them as is. We are hopeful to have software for this by her next appointment. She will f/u with me in 6 months. Time spent with pt today: 25 min with >50% spent in counseling and coordination of care.        Rossy LESLIE [facility]  10/23/2019  7:33 AM        Lab Results     Hemoglobin A1c   Date Value Ref Range Status   10/09/2019 7.7 (H) 3.5 - 6.0 % Final   07/09/2019 6.8 (H) 3.5 - 6.0 % Final   03/25/2019 6.9 (H) 3.5 - 6.0 % Final   01/24/2019 6.9 (H) 3.5 - 6.0 % Final   06/25/2018 6.2 (H) 3.5 - 6.0 % Final     Creatinine   Date Value Ref Range Status   10/22/2019 0.97 0.60 - 1.10 mg/dL Final   02/25/2019 0.85 0.60 -  "1.10 mg/dL Final   01/24/2019 0.96 0.60 - 1.10 mg/dL Final     Microalbumin, Random Urine   Date Value Ref Range Status   07/09/2019 2.19 (H) 0.00 - 1.99 mg/dL Final       Cholesterol   Date Value Ref Range Status   10/22/2019 145 <=199 mg/dL Final     HDL Cholesterol   Date Value Ref Range Status   10/22/2019 61 >=50 mg/dL Final     LDL Calculated   Date Value Ref Range Status   10/22/2019 65 <=129 mg/dL Final     Triglycerides   Date Value Ref Range Status   10/22/2019 93 <=149 mg/dL Final       Lab Results   Component Value Date    ALT 18 10/22/2019    AST 16 10/22/2019    ALKPHOS 123 (H) 10/22/2019    BILITOT 1.0 10/22/2019         Current Medications     Outpatient Medications Prior to Visit   Medication Sig Dispense Refill     ACCU-CHEK SOFTCLIX LANCETS lancets TESTING FOUR TIMES DAILY 400 each 0     aspirin 81 mg chewable tablet Chew.       atorvastatin (LIPITOR) 20 MG tablet Take 1 tablet by mouth daily.  0     BD ULTRA-FINE BRANDEN PEN NEEDLES 32 gauge x 5/32\" Ndle Used as directed       blood glucose test strips Use 1 each As Directed 3 (three) times a day. Dispense brand per patient's insurance at pharmacy discretion. 300 strip 0     cholecalciferol, vitamin D3, (VITAMIN D3) 2,000 unit Tab Take 1 tablet (2,000 Units total) by mouth daily. 90 tablet 3     DAILY VITES/IRON Tab tablet Take 1 tablet by mouth daily. 90 tablet 3     ergocalciferol (ERGOCALCIFEROL) 50,000 unit capsule TAKE 1 CAPSULE BY MOUTH 1 TIME A WEEK       glucagon, human recombinant, (GLUCAGON) 1 mg injection Use for severe hypoglycemia       levothyroxine (SYNTHROID, LEVOTHROID) 75 MCG tablet TK ONE T D AT LEAST ONE HOUR BEFORE OR TWO HOURS AFTER A MEAL  11     liraglutide (VICTOZA 2-CAROLA) 0.6 mg/0.1 mL (18 mg/3 mL) PnIj injection Inject 1.8 mg under the skin.       lisinopril (PRINIVIL,ZESTRIL) 20 MG tablet Take 1 tablet (20 mg total) by mouth daily. 90 tablet 1     NOVOLOG U-100 INSULIN ASPART 100 unit/mL injection ADM UP  UNI VIA " INSULIN PUMP DAILY. 30 mL 11     pantoprazole (PROTONIX) 40 MG tablet Take 1 tablet (40 mg total) by mouth daily. 90 tablet 1     traZODone (DESYREL) 100 MG tablet TAKE 1 TABLET BY MOUTH AT BEDTIME AS NEEDED FOR SLEEP 90 tablet 3     ACCU-CHEK SOFTCLIX LANCETS lancets TESTING FOUR TIMES DAILY 60 each 0     NOVOLOG FLEXPEN 100 unit/mL injection pen INJECT 1 UNIT PER 7GM CARBS PLUS CORRECTION UP TO 60 UNITS D; IF INSULIN PUMP FAILS  4     ketoconazole (NIZORAL) 2 % cream Apply two times a day until rash clears. 60 g 1     triamcinolone (KENALOG) 0.1 % cream Apply two times a day for no longer than 2 weeks. 30 g 0     No facility-administered medications prior to visit.

## 2021-06-03 VITALS
SYSTOLIC BLOOD PRESSURE: 142 MMHG | HEART RATE: 88 BPM | WEIGHT: 293 LBS | BODY MASS INDEX: 56.58 KG/M2 | DIASTOLIC BLOOD PRESSURE: 70 MMHG

## 2021-06-03 VITALS — WEIGHT: 293 LBS | BODY MASS INDEX: 48.82 KG/M2 | HEIGHT: 65 IN

## 2021-06-04 NOTE — TELEPHONE ENCOUNTER
Patient is calling in, wondering if any solutions have been reached on this. She would like a call back when we figure something out.

## 2021-06-04 NOTE — TELEPHONE ENCOUNTER
Spoke w/ pt. She states she is ready to just go back to [facility] as she knows she cannot afford the supplies any more on her fixed incom. Chart review shows she has been using the Tandem and dexcom since Nov 2016.   Pt states she has Novolog pens already. She just needs lantus and pen needles. She can take Novolog at 1:8-9g/meals. Will need to figure out lantus dose.     Pt reads her current pump settings:     12am: 1.40 units/hr  ICR 8  3am: 1.50 units/hr ICR 8.5  6am: 1.40 units/hr  ICR 8.5  9am: 1.35 units/hr ICR 9  6pm: 1.35 unit/shr ICR 9.5  10pm: 1.30 units/hr ICR 9    ISF: 35    Her total basal dose is: 33.05 units     Kamille - OK to send Lantus at 33 units/day?    Absolutely.  I agree with the aforementioned diabetes plan.  Rossy SIDHU [facility]  12/20/2019  12:11 PM

## 2021-06-04 NOTE — TELEPHONE ENCOUNTER
Lantus sent. Pt informed. She will use Novolog as well with meals at 1 unit for every 8-9g of carb. She will f/u as scheduled and call sooner w/ any concerns.

## 2021-06-04 NOTE — TELEPHONE ENCOUNTER
Kamille patient     Per patient new insurance for the new year will no longer cover for her to be on a Pump and CGM. She has a very high deductible and she can't afford to be on the Pump and CGM. She would like to know if there are any resource or agency that is able to help her stay on the Pump and Cgm?    If not she will need to go back to how she was using insulin previously.     Please assist with arranging so that she can start on insulin starting January 2020.    She said that she was previosuly on Lantus and Novolog.      Corinna @ [phone]    [facility] on file

## 2021-06-05 VITALS — HEIGHT: 65 IN | WEIGHT: 293 LBS | BODY MASS INDEX: 59.74 KG/M2 | BODY MASS INDEX: 59.74 KG/M2

## 2021-06-05 NOTE — TELEPHONE ENCOUNTER
RN cannot approve Refill Request    RN can NOT refill this medication Protocol failed and NO refill given. Last office visit: 8/5/2019 Masood Melton MD Last Physical: Visit date not found Last MTM visit: Visit date not found Last visit same specialty: 8/5/2019 Masood Melton MD.  Next visit within 3 mo: Visit date not found  Next physical within 3 mo: Visit date not found      Jeimy Horan, [facility] Triage/Med Refill 1/18/2020    Requested Prescriptions   Pending Prescriptions Disp Refills     DAILY VITES/IRON Tab tablet [Pharmacy Med Name: DAILY-MICHAEL TABLETS W/IRON] 90 tablet 3     Sig: TAKE ONE TABLET DAILY       There is no refill protocol information for this order

## 2021-06-05 NOTE — TELEPHONE ENCOUNTER
Refill Approved    Rx renewed per Medication Renewal Policy. Medication was last renewed on 7/29/19.    Elle Kruger, [facility] Triage/Med Refill 1/24/2020     Requested Prescriptions   Pending Prescriptions Disp Refills     lisinopril (PRINIVIL,ZESTRIL) 20 MG tablet [Pharmacy Med Name: LISINOPRIL 20MG TABLETS] 90 tablet 1     Sig: TAKE 1 TABLET(20 MG) BY MOUTH DAILY       Ace Inhibitors Refill Protocol Passed - 1/24/2020  3:15 AM        Passed - PCP or prescribing provider visit in past 12 months       Last office visit with prescriber/PCP: 8/5/2019 Masood Melton MD OR same dept: 8/5/2019 Masood Melton MD OR same specialty: 8/5/2019 Masood Melton MD  Last physical: Visit date not found Last MTM visit: Visit date not found   Next visit within 3 mo: Visit date not found  Next physical within 3 mo: Visit date not found  Prescriber OR PCP: Masood Melton MD  Last diagnosis associated with med order: 1. Essential hypertension, benign  - lisinopril (PRINIVIL,ZESTRIL) 20 MG tablet [Pharmacy Med Name: LISINOPRIL 20MG TABLETS]; TAKE 1 TABLET(20 MG) BY MOUTH DAILY  Dispense: 90 tablet; Refill: 1    If protocol passes may refill for 12 months if within 3 months of last provider visit (or a total of 15 months).             Passed - Serum Potassium in past 12 months     Lab Results   Component Value Date    Potassium 4.2 10/22/2019             Passed - Blood pressure filed in past 12 months     BP Readings from Last 1 Encounters:   10/22/19 142/70             Passed - Serum Creatinine in past 12 months     Creatinine   Date Value Ref Range Status   10/22/2019 0.97 0.60 - 1.10 mg/dL Final

## 2021-06-05 NOTE — PROGRESS NOTES
Office Visit - Follow Up   Corinna Farias   64 y.o. female    Date of Visit: 2/5/2020    Chief Complaint   Patient presents with     Hyperlipidemia     6 mo f/u - fasting         Assessment and Plan   1. Morbid obesity (H)  It be nice to get her into the bariatric clinic to discuss diet and I have also urged her to become more active with walking in the hallway of her apartment building.  - Ambulatory referral to Bariatric Care: Surgical and Non-Surgical    2. Type 2 diabetes mellitus with complication, with long-term current use of insulin (H)  Labs today for monitoring.  Concerning and I have asked that she follow-up with her endocrinologist if her sugars are not adequately controlled on the new regimen when she transitions.  - atorvastatin (LIPITOR) 20 MG tablet; Take 1 tablet (20 mg total) by mouth daily.  Dispense: 90 tablet; Refill: 3  - Lipid Cascade  - Comprehensive Metabolic Panel    3. Gastroesophageal reflux disease, esophagitis presence not specified  Controlled continue current regimen.    4. Essential hypertension, benign  Controlled continue current regimen.    5. Acquired hypothyroidism  Check TSH given her weight gain.  - levothyroxine (SYNTHROID, LEVOTHROID) 75 MCG tablet; TK ONE T D AT LEAST ONE HOUR BEFORE OR TWO HOURS AFTER A MEAL  Dispense: 90 tablet; Refill: 3  - Thyroid Rector    6. Visit for screening mammogram  Due for mammogram.  - Mammo Screening Bilateral; Future    7. Rash  She will meet with a dermatologist given that she cannot clear her skin up.    8. Dermatitis  As above.        Return in about 3 months (around 5/5/2020) for Recheck.     History of Present Illness   This 64 y.o. old Corinna comes in today for follow-up.  Unfortunately Corinna is going to be losing her insulin pump.  Her insurance will no longer cover it except with very high co-pay and she has a high deductible of with her new insurer.  Very concerning.  She is going to go back on the Lantus and NovoLog.  She is  "still dealing with the rash.  She is gained concern amount of weight.  She is status post a stomach stapling type of procedure many years ago but that was reversed apparently.  She has been very in mobile.  She is up 30 pounds since I saw her last.  Her  is working.  He does maintenance.  He has a DWI and only drives with a governor.  They are living in apartment fortunately.  For a while they were homeless.  She denies other somatic concerns for me today.    Review of Systems: A comprehensive review of systems was negative except as noted.     Medications, Allergies and Problem List   Reviewed, reconciled and updated  Post Discharge Medication Reconciliation Status:      Physical Exam   General Appearance:   Morbidly obese woman.  Fair spirits.  Blood pressure noted.    /74 (Patient Site: Right Arm, Patient Position: Sitting, Cuff Size: Adult Large)   Pulse 88   Ht 5' 5\" (1.651 m)   Wt (!) 350 lb (158.8 kg)   SpO2 98%   BMI 58.24 kg/m           Additional Information   Current Outpatient Medications   Medication Sig Dispense Refill     ACCU-CHEK FASTCLIX LANCET DRUM TESTING FOUR TIMES DAILY 400 each 1     ACCU-CHEK GUIDE strips TESTING THREE TIMES DAILY 300 strip 0     ACCU-CHEK SOFTCLIX LANCETS lancets TESTING FOUR TIMES DAILY 400 each 0     aspirin 81 mg chewable tablet Chew.       atorvastatin (LIPITOR) 20 MG tablet Take 1 tablet (20 mg total) by mouth daily. 90 tablet 3     cholecalciferol, vitamin D3, (VITAMIN D3) 2,000 unit Tab Take 1 tablet (2,000 Units total) by mouth daily. 90 tablet 3     DAILY VITES/IRON Tab tablet TAKE ONE TABLET DAILY 90 tablet 3     ergocalciferol (ERGOCALCIFEROL) 50,000 unit capsule TAKE 1 CAPSULE BY MOUTH 1 TIME A WEEK       glucagon, human recombinant, (GLUCAGON) 1 mg injection Use for severe hypoglycemia       insulin glargine (LANTUS SOLOSTAR U-100 INSULIN) 100 unit/mL (3 mL) pen Inject 33 Units under the skin daily. Do not mix Lantus with any other insulin 5 " "adj dose pen 1     ketoconazole (NIZORAL) 2 % cream Apply two times a day until rash clears. 60 g 1     levothyroxine (SYNTHROID, LEVOTHROID) 75 MCG tablet TK ONE T D AT LEAST ONE HOUR BEFORE OR TWO HOURS AFTER A MEAL 90 tablet 3     liraglutide (VICTOZA 2-CAROLA) 0.6 mg/0.1 mL (18 mg/3 mL) PnIj injection Inject 1.8 mg under the skin.       lisinopril (PRINIVIL,ZESTRIL) 20 MG tablet Take 1 tablet (20 mg total) by mouth daily. 90 tablet 0     NOVOLOG U-100 INSULIN ASPART 100 unit/mL injection USE IN INSULIN PUMP UP  UNITS DAILY 72 mL 1     pantoprazole (PROTONIX) 40 MG tablet TAKE 1 TABLET(40 MG) BY MOUTH DAILY 90 tablet 2     pen needle, diabetic (BD ULTRA-FINE BRANDEN PEN NEEDLE) 32 gauge x 5/32\" Ndle Use 1 each As Directed 4 (four) times a day. 100 each 6     traZODone (DESYREL) 100 MG tablet TAKE 1 TABLET BY MOUTH AT BEDTIME AS NEEDED FOR SLEEP 90 tablet 3     triamcinolone (KENALOG) 0.1 % cream Apply two times a day for no longer than 2 weeks. 30 g 0     No current facility-administered medications for this visit.      No Known Allergies  Social History     Tobacco Use     Smoking status: Never Smoker     Smokeless tobacco: Never Used   Substance Use Topics     Alcohol use: No     Drug use: No       Review and/or order of clinical lab tests:  Review and/or order of radiology tests:  Review and/or order of medicine tests:  Discussion of test results with performing physician:  Decision to obtain old records and/or obtain history from someone other than the patient:  Review and summarization of old records and/or obtaining history from someone other than the patient and.or discussion of case with another health care provider:  Independent visualization of image, tracing or specimen itself:    Time: not applicable     Masood Melton MD  "

## 2021-06-05 NOTE — TELEPHONE ENCOUNTER
Refill Approved    Rx renewed per Medication Renewal Policy. Medication was last renewed on 8/5/19.    Linh Pena, [facility] Triage/Med Refill 1/20/2020     Requested Prescriptions   Pending Prescriptions Disp Refills     pantoprazole (PROTONIX) 40 MG tablet [Pharmacy Med Name: PANTOPRAZOLE 40MG TABLETS] 90 tablet 1     Sig: TAKE 1 TABLET(40 MG) BY MOUTH DAILY       GI Medications Refill Protocol Passed - 1/19/2020  3:15 AM        Passed - PCP or prescribing provider visit in last 12 or next 3 months.     Last office visit with prescriber/PCP: 8/5/2019 Masood Melton MD OR same dept: 8/5/2019 Masood Melton MD OR same specialty: 8/5/2019 Masood Melton MD  Last physical: Visit date not found Last MTM visit: Visit date not found   Next visit within 3 mo: Visit date not found  Next physical within 3 mo: Visit date not found  Prescriber OR PCP: Masood Melton MD  Last diagnosis associated with med order: 1. Gastroesophageal reflux disease, esophagitis presence not specified  - pantoprazole (PROTONIX) 40 MG tablet [Pharmacy Med Name: PANTOPRAZOLE 40MG TABLETS]; TAKE 1 TABLET(40 MG) BY MOUTH DAILY  Dispense: 90 tablet; Refill: 1    If protocol passes may refill for 12 months if within 3 months of last provider visit (or a total of 15 months).

## 2021-06-06 NOTE — TELEPHONE ENCOUNTER
RN cannot approve Refill Request    RN can NOT refill this medication med is not covered by policy/route to provider     . Last office visit: 2/5/2020 Masood Melton MD Last Physical: Visit date not found Last MTM visit: Visit date not found Last visit same specialty: 2/5/2020 Masood Melton MD.  Next visit within 3 mo: Visit date not found  Next physical within 3 mo: Visit date not found      Whit Fuentes, [facility] Triage/Med Refill 2/25/2020    Requested Prescriptions   Pending Prescriptions Disp Refills     cholecalciferol, vitamin D3, 50 mcg (2,000 unit) Tab [Pharmacy Med Name: VITAMIN D 2,000UNIT TABLETS] 90 tablet 3     Sig: TAKE ONE TABLET BY MOUTH DAILY       There is no refill protocol information for this order

## 2021-06-07 NOTE — PROGRESS NOTES
"Corinna Farias is a 64 y.o. female who is being evaluated via a billable telephone visit.      The patient has been notified of following:     \"This telephone visit will be conducted via a call between you and your physician/provider. We have found that certain health care needs can be provided without the need for a physical exam.  This service lets us provide the care you need with a short phone conversation.  If a prescription is necessary we can send it directly to your pharmacy.  If lab work is needed we can place an order for that and you can then stop by our lab to have the test done at a later time.    Telephone visits are billed at different rates depending on your insurance coverage. During this emergency period, for some insurers they may be billed the same as an in-person visit.  Please reach out to your insurance provider with any questions.    If during the course of the call the physician/provider feels a telephone visit is not appropriate, you will not be charged for this service.\"    Patient has given verbal consent to a Telephone visit? Yes    Patient would like to receive their AVS by AVS Preference: Mail a copy.    Additional provider notes:      Reason for visit      1. Type 2 diabetes mellitus with complication, with long-term current use of insulin (H)        HPI     Corinna Farias is a very pleasant 64 y.o. old female who presents for follow up.  SUMMARY:  Corinna is contacted today in f/u for DM 2. Her A1c has gone from 7.7 to 8.4. She notes that she no longer has the ability to use either the T-slim pump or the Dexcom because it is too costly. She reports that she went on SS alf in November and that the supplies are $1500 a month and not covered. She is currently taking Basaglar 33 units daily.  She has a CHO ratio of about 1:7 and is doing the best that she can to estimate.  She did not have any data for perusal today.         Blood glucose data:      Past Medical History "     Patient Active Problem List   Diagnosis     Type 2 diabetes mellitus with complication, with long-term current use of insulin (H)     GIOVANI (obstructive sleep apnea)     Morbid obesity (H)     Hypothyroid     Vitamin D deficiency     GERD (gastroesophageal reflux disease)     Essential hypertension, benign     Bariatric surgery status     Cervical cancer screening     Female climacteric state     Insomnia     Insulin pump status     Pain in joint involving lower leg     Pseudophakia        Family History       family history is not on file.    Social History      reports that she has never smoked. She has never used smokeless tobacco. She reports that she does not drink alcohol or use drugs.      Review of Systems     Patient has no polyuria or polydipsia, no chest pain, dyspnea or TIA's, no numbness, tingling or pain in extremities  Remainder negative except as noted in HPI.    Vital Signs     There were no vitals taken for this visit.  Wt Readings from Last 3 Encounters:   02/05/20 (!) 350 lb (158.8 kg)   10/22/19 (!) 340 lb (154.2 kg)   08/05/19 (!) 321 lb (145.6 kg)       Assessment     1. Type 2 diabetes mellitus with complication, with long-term current use of insulin (H)        Plan     Instructed Corinna to increase her Lantus dosage to 40 units and then to take 20 units in the morning and 20 units in the evening. We will also try and get the Waldemar system for her as it potentially is less expensive. She will f/u with me in 3 months.         Lab Results     Hemoglobin A1c   Date Value Ref Range Status   04/20/2020 8.4 (H) 3.5 - 6.0 % Final   10/09/2019 7.7 (H) 3.5 - 6.0 % Final   07/09/2019 6.8 (H) 3.5 - 6.0 % Final   03/25/2019 6.9 (H) 3.5 - 6.0 % Final   01/24/2019 6.9 (H) 3.5 - 6.0 % Final     Creatinine   Date Value Ref Range Status   02/05/2020 0.94 0.60 - 1.10 mg/dL Final   10/22/2019 0.97 0.60 - 1.10 mg/dL Final   02/25/2019 0.85 0.60 - 1.10 mg/dL Final     Microalbumin, Random Urine   Date Value Ref  "Range Status   07/09/2019 2.19 (H) 0.00 - 1.99 mg/dL Final       Cholesterol   Date Value Ref Range Status   02/05/2020 173 <=199 mg/dL Final     HDL Cholesterol   Date Value Ref Range Status   02/05/2020 49 (L) >=50 mg/dL Final     LDL Calculated   Date Value Ref Range Status   02/05/2020 99 <=129 mg/dL Final     Triglycerides   Date Value Ref Range Status   02/05/2020 124 <=149 mg/dL Final       Lab Results   Component Value Date    ALT 18 02/05/2020    AST 19 02/05/2020    ALKPHOS 102 02/05/2020    BILITOT 0.6 02/05/2020         Current Medications     Outpatient Medications Prior to Visit   Medication Sig Dispense Refill     ACCU-CHEK FASTCLIX LANCET DRUM TESTING FOUR TIMES DAILY 400 each 1     ACCU-CHEK GUIDE strips TESTING THREE TIMES DAILY 300 strip 0     ACCU-CHEK SOFTCLIX LANCETS lancets TESTING FOUR TIMES DAILY 400 each 0     aspirin 81 mg chewable tablet Chew.       atorvastatin (LIPITOR) 20 MG tablet Take 1 tablet (20 mg total) by mouth daily. 90 tablet 3     cholecalciferol, vitamin D3, 50 mcg (2,000 unit) Tab TAKE ONE TABLET BY MOUTH DAILY 90 tablet 3     DAILY VITES/IRON Tab tablet TAKE ONE TABLET DAILY 90 tablet 3     ergocalciferol (ERGOCALCIFEROL) 50,000 unit capsule TAKE 1 CAPSULE BY MOUTH 1 TIME A WEEK       insulin glargine (BASAGLAR KWIKPEN) 100 unit/mL (3 mL) pen INJECT 33 UNITS SUBCUTANEOUS DAILY 9 mL 0     levothyroxine (SYNTHROID, LEVOTHROID) 75 MCG tablet TK ONE T D AT LEAST ONE HOUR BEFORE OR TWO HOURS AFTER A MEAL 90 tablet 3     lisinopriL (PRINIVIL,ZESTRIL) 20 MG tablet Take 1 tablet (20 mg total) by mouth daily. 90 tablet 3     NOVOLOG FLEXPEN U-100 INSULIN 100 unit/mL (3 mL) injection pen INJECT ONE UNIT PER 7 GRAMS. MAX 60 UNITS DAILY 20 mL 0     pantoprazole (PROTONIX) 40 MG tablet TAKE 1 TABLET(40 MG) BY MOUTH DAILY 90 tablet 2     pen needle, diabetic (BD ULTRA-FINE BRANDEN PEN NEEDLE) 32 gauge x 5/32\" Ndle Use 1 each As Directed 4 (four) times a day. 100 each 6     NOVOLOG U-100 " INSULIN ASPART 100 unit/mL injection USE IN INSULIN PUMP UP  UNITS DAILY 72 mL 1     glucagon, human recombinant, (GLUCAGON) 1 mg injection Use for severe hypoglycemia       traZODone (DESYREL) 100 MG tablet TAKE 1 TABLET BY MOUTH AT BEDTIME AS NEEDED FOR SLEEP 90 tablet 3     ketoconazole (NIZORAL) 2 % cream Apply two times a day until rash clears. 60 g 1     liraglutide (VICTOZA 2-CAROLA) 0.6 mg/0.1 mL (18 mg/3 mL) PnIj injection Inject 1.8 mg under the skin.       triamcinolone (KENALOG) 0.1 % cream Apply two times a day for no longer than 2 weeks. 30 g 0     No facility-administered medications prior to visit.        Total time of call between patient and provider was 20 minutes    Mikel RODRIGUEZ

## 2021-06-07 NOTE — TELEPHONE ENCOUNTER
Refill Approved    Rx renewed per Medication Renewal Policy. Medication was last renewed on 1/24/2020.   Last office visit: 2/5/2020 with PCP Dr KEATON Melton.    Jasmin Tuttle, [facility] Triage/Med Refill 4/14/2020     Requested Prescriptions   Pending Prescriptions Disp Refills     lisinopriL (PRINIVIL,ZESTRIL) 20 MG tablet [Pharmacy Med Name: LISINOPRIL 20MG TABLETS] 90 tablet 0     Sig: TAKE 1 TABLET(20 MG) BY MOUTH DAILY       Ace Inhibitors Refill Protocol Passed - 4/14/2020  5:22 AM        Passed - PCP or prescribing provider visit in past 12 months       Last office visit with prescriber/PCP: 2/5/2020 Masood Melton MD OR same dept: 2/5/2020 Masood Melton MD OR same specialty: 2/5/2020 Masood Melton MD  Last physical: Visit date not found Last MTM visit: Visit date not found   Next visit within 3 mo: Visit date not found  Next physical within 3 mo: Visit date not found  Prescriber OR PCP: Masood Melton MD  Last diagnosis associated with med order: 1. Essential hypertension, benign  - lisinopriL (PRINIVIL,ZESTRIL) 20 MG tablet [Pharmacy Med Name: LISINOPRIL 20MG TABLETS]; TAKE 1 TABLET(20 MG) BY MOUTH DAILY  Dispense: 90 tablet; Refill: 0    If protocol passes may refill for 12 months if within 3 months of last provider visit (or a total of 15 months).             Passed - Serum Potassium in past 12 months     Lab Results   Component Value Date    Potassium 4.5 02/05/2020             Passed - Blood pressure filed in past 12 months     BP Readings from Last 1 Encounters:   02/05/20 126/74             Passed - Serum Creatinine in past 12 months     Creatinine   Date Value Ref Range Status   02/05/2020 0.94 0.60 - 1.10 mg/dL Final

## 2021-06-10 NOTE — TELEPHONE ENCOUNTER
Spoke with the patient and relayed message below from Dr. Melton.  She verbalized understanding and states that she will not discontinue the trazodone as she needs that for sleep.  Patient states that since this was the only medication that he suggested discontinuing, she does not need a sooner appointment to discuss.  Kamille DAWKINS CMA/ELVIRA....................4:00 PM

## 2021-06-10 NOTE — TELEPHONE ENCOUNTER
Dr. Melton,  Left message for the patient to call and schedule a virtual visit sooner than the upcoming appointment on 8/10 to discuss her medications.  Any further recommendations?  Kamille DAWKINS CMA/ELVIRA....................3:45 PM

## 2021-06-10 NOTE — TELEPHONE ENCOUNTER
M for patient to call back. Please help her reschedule her physical on 8/10 with Dr. Melton as he will only be doing virtual care that day.

## 2021-06-10 NOTE — TELEPHONE ENCOUNTER
Who is calling:    Patient      Reason for Call:    Caller states she has to pay out of pocket for her medication and is requesting PCP to review her medication list and see if there is any med's that can be eliminated?    Date of last appointment with primary care:   02/05/2020    Okay to leave a detailed message: Yes    Please call patient to discuss med management.

## 2021-06-11 NOTE — TELEPHONE ENCOUNTER
Refill Approved    Rx renewed per Medication Renewal Policy. Medication was last renewed on 9/27/19.    Whit Fuentes, [facility] Triage/Med Refill 9/14/2020     Requested Prescriptions   Pending Prescriptions Disp Refills     traZODone (DESYREL) 100 MG tablet [Pharmacy Med Name: TRAZODONE 100MG TABLETS] 90 tablet 3     Sig: TAKE ONE TABLET AT BEDTIME AS NEEDED FOR SLEEP       Tricyclics/Misc Antidepressant/Antianxiety Meds Refill Protocol Passed - 9/12/2020  5:29 AM        Passed - PCP or prescribing provider visit in last year     Last office visit with prescriber/PCP: 2/5/2020 Masood Melton MD OR same dept: 2/5/2020 Masood Melton MD OR same specialty: 2/5/2020 Masood Melton MD  Last physical: Visit date not found Last MTM visit: Visit date not found   Next visit within 3 mo: Visit date not found  Next physical within 3 mo: Visit date not found  Prescriber OR PCP: Masood Melton MD  Last diagnosis associated with med order: 1. Chronic insomnia  - traZODone (DESYREL) 100 MG tablet [Pharmacy Med Name: TRAZODONE 100MG TABLETS]; TAKE ONE TABLET AT BEDTIME AS NEEDED FOR SLEEP  Dispense: 90 tablet; Refill: 3    If protocol passes may refill for 12 months if within 3 months of last provider visit (or a total of 15 months).

## 2021-06-12 ENCOUNTER — COMMUNICATION - HEALTHEAST (OUTPATIENT)
Dept: ENDOCRINOLOGY | Facility: CLINIC | Age: 66
End: 2021-06-12

## 2021-06-12 DIAGNOSIS — E11.9 DIABETES MELLITUS, TYPE 2 (H): ICD-10-CM

## 2021-06-12 NOTE — TELEPHONE ENCOUNTER
Corinna is here at [facility] lab for fasting labs prior to scheduled physical on 10/20.  We are drawing blood for Kamille Baig today (BMP, A1C, Malb) but Corinna is fasting.  Please order labs if appropriate.  If not please advise patient she will need to be drawn at her Physical appointment.    Thanks,     [place]

## 2021-06-12 NOTE — PATIENT INSTRUCTIONS - HE
Please get a shingles vaccine at the pharmacy.     Cut the pantoprazole back to one every other day for a month.  If you don't have any reflux or heartburn in a month, stop the medicine and let me know if symptoms return.

## 2021-06-12 NOTE — PROGRESS NOTES
Office Visit - Physical   Corinna Farias   65 y.o.  female    Date of visit: 10/20/2020  Physician: Masood Melton MD     Assessment and Plan   1. Routine general medical examination at a health care facility  Like to see a female gynecologist for her GYN cares.  I think that is reasonable.  She would like to proceed with Cologuard again as its been 3 years.  Mammograms due.  She is never had a bone density and I think we should get a baseline.  Flu shot today.  I counseled on shingles vaccination at her pharmacy.  We will see her back in 6 months.  - Ambulatory referral to Gynecology  - Cologuard  - Mammo Screening Bilateral; Future  - DXA Bone Density Scan; Future    2. Type 2 diabetes mellitus with complication, with long-term current use of insulin (H)  Better control.  I did discuss with patient that she is now developing some microalbuminuria and she should remain on the lisinopril but concentrate on good sugar control and good blood pressure control.  - Ambulatory referral to Ophthalmology    3. Microalbuminuric diabetic nephropathy (H)  As above.  Continue lisinopril.  Urged A1c closer to 7.    4. Morbid obesity (H)  Follow-up with bariatric clinic as previously recommended.    5. GIOVANI (obstructive sleep apnea)  I discussed with patient that it would be best if she treated her sleep apnea.  She is resistant to that however.  I did  patient about deleterious cardiac effects at [place].    6. Insomnia, unspecified type  Not sleeping well.  I offered sleep evaluation and she declines.    7. Postmenopausal status    - DXA Bone Density Scan; Future    8. Hypothyroidism, unspecified type  Recent TSH was borderline low.  I would rather her run a little low then hypothyroid.  Will check full profile next visit.    9. Gastroesophageal reflux disease, unspecified whether esophagitis present  We will wean her Protonix to every other day for a month and then give her a trial off of it assuming she does well with  that.    10. Essential hypertension, benign  Blood pressure is very well controlled continue same regimen.    The following high BMI interventions were performed this visit: encouragement to exercise, weight loss from baseline weight and special diet education    Return in about 6 months (around 4/20/2021) for Recheck.     Chief Complaint   Chief Complaint   Patient presents with     Annual Exam     no fasting - flu shot today         Patient Profile   Social History     Social History Narrative    ** Merged History Encounter **             Past Medical History   Patient Active Problem List   Diagnosis     Type 2 diabetes mellitus with complication, with long-term current use of insulin (H)     GIOVANI (obstructive sleep apnea)     Morbid obesity (H)     Hypothyroid     Vitamin D deficiency     GERD (gastroesophageal reflux disease)     Essential hypertension, benign     Bariatric surgery status     Cervical cancer screening     Female climacteric state     Insomnia     Pain in joint involving lower leg     Pseudophakia     Microalbuminuric diabetic nephropathy (H)       Past Surgical History  She has a past surgical history that includes Gastric bypass; Cholecystectomy; Gastric restriction surgery; and gastric restriction takedown.     History of Present Illness   This 65 y.o. old Corinna comes in a for physical.  Have not seen her in some time.  She is a pleasant woman with morbid obesity.  She has resulting diabetes, sleep apnea which is not treated, reflux.  She follows with endocrinology for her diabetes because she used to be on a pump.  Her insurance would not cover this however so now she is on a injected 4 times a day schedule.  She does have a continuous glucose monitor and she tells me she is taking good care of her sugars.  Her most recent A1c was 7.8.  She tells me she does not sleep well.  She has known sleep apnea which is untreated.  She has early morning awakening and mechanical back to sleep.  She has  no interest in going back to the sleep clinic.    We reviewed her recent labs.  Her TSH was slightly low recently.  She denies any symptoms of hyperthyroidism.    She has reflux or at least was placed on PPI in the past due to some reflux she had after her sleeve gastrectomy.  She is not had any problems for years with reflux and wonders if she needs to continue her medications for this.    She has gained weight.  She did not meet with the bariatric clinic as I had asked her to.  She wonders if she should proceed with that now.    Review of Systems: A comprehensive review of systems was negative except as noted.     Medications and Allergies   Current Outpatient Medications   Medication Sig Dispense Refill     ACCU-CHEK FASTCLIX LANCET DRUM TESTING FOUR TIMES DAILY 400 each 1     ACCU-CHEK SOFTCLIX LANCETS lancets TESTING FOUR TIMES DAILY 400 each 0     aspirin 81 mg chewable tablet Chew.       atorvastatin (LIPITOR) 20 MG tablet Take 1 tablet (20 mg total) by mouth daily. 90 tablet 3     cholecalciferol, vitamin D3, 50 mcg (2,000 unit) Tab TAKE ONE TABLET BY MOUTH DAILY 90 tablet 3     DAILY VITES/IRON Tab tablet TAKE ONE TABLET DAILY 90 tablet 3     ergocalciferol (ERGOCALCIFEROL) 50,000 unit capsule TAKE 1 CAPSULE BY MOUTH 1 TIME A WEEK       flash glucose scanning reader (FREESTYLE [place] 14 DAY READER) Misc Use 1 each As Directed continuous. Us to read blood glucose Per 's instructions 1 each 0     flash glucose sensor (FREESTYLE [place] 14 DAY SENSOR) Kit Use 1 each As Directed every 14 (fourteen) days. Change every 14 days Per 's instructions 2 kit 5     insulin glargine (BASAGLAR KWIKPEN) 100 unit/mL (3 mL) pen ADMINISTER 20 UNITS UNDER THE SKIN TWICE DAILY 30 mL 0     levothyroxine (SYNTHROID, LEVOTHROID) 75 MCG tablet TK ONE T D AT LEAST ONE HOUR BEFORE OR TWO HOURS AFTER A MEAL 90 tablet 3     lisinopriL (PRINIVIL,ZESTRIL) 20 MG tablet Take 1 tablet (20 mg total) by mouth daily. 90  "tablet 3     NOVOLOG FLEXPEN U-100 INSULIN 100 unit/mL (3 mL) injection pen INJECT 1 UNIT PER 7 GRAMS CARBS AS DIRECTED. MAX 60 UNITS DAILY 18 mL 3     ONETOUCH ULTRA BLUE TEST STRIP strips TEST THREE TIMES DAILY 300 strip 0     pantoprazole (PROTONIX) 40 MG tablet Take 1 tablet (40 mg total) by mouth daily. 90 tablet 2     pen needle, diabetic (BD ULTRA-FINE BRANDEN PEN NEEDLE) 32 gauge x 5/32\" Ndle Use 1 each As Directed 4 (four) times a day. 100 each 6     traZODone (DESYREL) 100 MG tablet TAKE ONE TABLET AT BEDTIME AS NEEDED FOR SLEEP 90 tablet 1     glucagon, human recombinant, (GLUCAGON) 1 mg injection Use for severe hypoglycemia       No current facility-administered medications for this visit.      No Known Allergies     Family and Social History   No family history on file.     Social History     Tobacco Use     Smoking status: Never Smoker     Smokeless tobacco: Never Used   Substance Use Topics     Alcohol use: No     Drug use: No        Physical Exam   General Appearance:   Pleasant morbidly obese woman in no distress.  HEENT is unremarkable.  Wearing a mask.    /84 (Patient Site: Right Arm, Patient Position: Sitting, Cuff Size: Adult Regular)   Pulse (!) 102   Temp 97  F (36.1  C)   Ht 5' 5\" (1.651 m)   Wt (!) 360 lb (163.3 kg)   SpO2 96%   BMI 59.91 kg/m      EYES: Eyelids, conjunctiva, and sclera were normal. Pupils were normal. Cornea, iris, and lens were normal bilaterally.  HEAD, EARS, NOSE, MOUTH, AND THROAT: Head and face were normal. Hearing was normal to voice and the ears were normal to external exam.    NECK: Neck appearance was normal. There were no neck masses and the thyroid was not enlarged.  RESPIRATORY: Breathing pattern was normal and the chest moved symmetrically.  Percussion/auscultatory percussion was normal.  Lung sounds were normal and there were no abnormal sounds.  CARDIOVASCULAR: Heart rate and rhythm were normal.  S1 and S2 were normal and there were no extra sounds " or murmurs. Peripheral pulses in arms and legs were normal.  Jugular venous pressure was normal.  There was no peripheral edema.  GASTROINTESTINAL: The abdomen was normal in contour.  Bowel sounds were present.  Percussion detected no organ enlargement or tenderness.  Palpation detected no tenderness, mass, or enlarged organs.   MUSCULOSKELETAL: Skeletal configuration was normal and muscle mass was normal for age. Joint appearance was overall normal.  LYMPHATIC: There were no enlarged nodes.  SKIN/HAIR/NAILS: Skin color was normal.  There were no skin lesions.  Hair and nails were normal.  NEUROLOGIC: The patient was alert and oriented to person, place, time, and circumstance. Speech was normal. Cranial nerves were normal. Motor strength was normal for age. The patient was normally coordinated.  PSYCHIATRIC:  Mood and affect were normal and the patient had normal recent and remote memory. The patient's judgment and insight were normal.    ADDITIONAL VITAL SIGNS: none  CHEST WALL/BREASTS: def    RECTAL: def  GENITAL/URINARY: def     Additional Information        Masood Melton MD  Internal Medicine  Contact me at [phone]

## 2021-06-12 NOTE — TELEPHONE ENCOUNTER
Prior Authorization Request  Who s requesting:  Pharmacy  Pharmacy Name and Location: [facility] #[id number]  Medication Name: levothyroxine (SYNTHROID, LEVOTHROID) 75 MCG tablet  Insurance Plan: [employer]   Insurance Member ID Number:  [id number]  CoverMyMeds Key: [id number]/A  Informed patient that prior authorizations can take up to 10 business days for response:   NA  Okay to leave a detailed message: No

## 2021-06-12 NOTE — TELEPHONE ENCOUNTER
Central PA team  [phone]  Pool: HE PA MED ([id number])          PA has been initiated.       PA form completed and faxed insurance via Cover My Meds     Key:   [id number]     Medication:  Levothyroxine Sodium 75MCG tablets    Insurance:  [employer]         Response will be received via fax and may take up to 5-10 business days depending on plan

## 2021-06-12 NOTE — PROGRESS NOTES
"Corinna Farias is a 65 y.o. female who is being evaluated via a billable video visit.      The patient has been notified of following:     \"This video visit will be conducted via a call between you and your physician/provider. We have found that certain health care needs can be provided without the need for an in-person physical exam.  This service lets us provide the care you need with a video conversation.  If a prescription is necessary we can send it directly to your pharmacy.  If lab work is needed we can place an order for that and you can then stop by our lab to have the test done at a later time.    Video visits are billed at different rates depending on your insurance coverage. Please reach out to your insurance provider with any questions.    If during the course of the call the physician/provider feels a video visit is not appropriate, you will not be charged for this service.\"    Patient has given verbal consent to a Video visit? Yes  How would you like to obtain your AVS? AVS Preference: MyChart.  If dropped by the video visit, the video invitation should be sent to: Text to cell phone: [phone]   Will anyone else be joining your video visit? No        Video Start Time: 1016    Additional provider notes:       Reason for visit      1. Type 2 diabetes mellitus with complication, with long-term current use of insulin (H)    2. Diabetes mellitus, type 2 (H)        HPI     Corinna Farias is a very pleasant 65 y.o. old female who presents for follow up.  SUMMARY:    Corinna is contacted today via Video Visit in f/u for DM 2. Her current A1c is 7.8 and down from her last at 8.4. She credits this to having a CGM. She is using the Freestyle [place]. She is testing between 4 and 8 times a day. She is unable to use an insulin pump because of the cost of supplies. She is taking Basaglar, 40 units in the morning and 40 units in the evening. She uses a CHO ratio of about 1:7 for her Novolog. She notes that she is " watching her diet as much as she can do. She is also staying sequestered as much as possible because of COVID. Blood sugars are consistent with her A1c.      Blood Sugar Readings    10-27  12:42a 171  5:00a 270  9:00a 180  2:42p 293  7:00p 191    10-28  2:48a 293  5:00a 261  6:00a 179  8:15a 142  10:00a 194  12:30p 70  6:49p 160  8:15p 253    10-29  12:20a 144  7:00a 135  3:28p 231  8:41p 195    10-30  1:40a 115  5:30a 220  10:00a 76  11:30a 68  2:00p 246  9:00p 183    10-31  1:00a 223  5:30a 75  7:40a 230  10:00a 151  11:30a 56  3:00p 206  8:35p 240  10:00p 185    11-1  1:22a 94  8:00a 164  12:40p 296  3:48p 219  7:30p 168    11-2  5:13a 340  8:00a 196  12:00p 54  12:46p 123  3:00p 135  5:59p 273    11-3  7:00a 168      Past Medical History     Patient Active Problem List   Diagnosis     Type 2 diabetes mellitus with complication, with long-term current use of insulin (H)     GIOVANI (obstructive sleep apnea)     Morbid obesity (H)     Hypothyroid     Vitamin D deficiency     GERD (gastroesophageal reflux disease)     Essential hypertension, benign     Bariatric surgery status     Cervical cancer screening     Female climacteric state     Insomnia     Pain in joint involving lower leg     Pseudophakia     Microalbuminuric diabetic nephropathy (H)        Family History       family history is not on file.    Social History      reports that she has never smoked. She has never used smokeless tobacco. She reports that she does not drink alcohol or use drugs.      Review of Systems     Patient has no polyuria or polydipsia, no chest pain, dyspnea or TIA's, no numbness, tingling or pain in extremities  Remainder negative except as noted in HPI.    Vital Signs     There were no vitals taken for this visit.  Wt Readings from Last 3 Encounters:   10/20/20 (!) 360 lb (163.3 kg)   02/05/20 (!) 350 lb (158.8 kg)   10/22/19 (!) 340 lb (154.2 kg)             Assessment     1. Type 2 diabetes mellitus with complication, with  long-term current use of insulin (H)    2. Diabetes mellitus, type 2 (H)        Plan     Corinna has been doing good work. She will continue to pay attention and will stick with her current medication regimen. She will f/u with me in 6 months.         Lab Results     Hemoglobin A1c   Date Value Ref Range Status   10/15/2020 7.8 (H) <=5.6 % Final     Comment:     Normal <5.7% Prediabete 5.7-6.4% Diabletes 6.5% or higher - adopted from ADA consensus guidelines   04/20/2020 8.4 (H) 3.5 - 6.0 % Final   10/09/2019 7.7 (H) 3.5 - 6.0 % Final   07/09/2019 6.8 (H) 3.5 - 6.0 % Final   03/25/2019 6.9 (H) 3.5 - 6.0 % Final     Creatinine   Date Value Ref Range Status   10/15/2020 0.93 0.60 - 1.10 mg/dL Final   02/05/2020 0.94 0.60 - 1.10 mg/dL Final   10/22/2019 0.97 0.60 - 1.10 mg/dL Final     Microalbumin, Random Urine   Date Value Ref Range Status   10/15/2020 6.40 (H) 0.00 - 1.99 mg/dL Final       Cholesterol   Date Value Ref Range Status   10/15/2020 124 <=199 mg/dL Final     HDL Cholesterol   Date Value Ref Range Status   10/15/2020 48 (L) >=50 mg/dL Final     LDL Calculated   Date Value Ref Range Status   10/15/2020 57 <=129 mg/dL Final     Triglycerides   Date Value Ref Range Status   10/15/2020 97 <=149 mg/dL Final       Lab Results   Component Value Date    ALT 18 02/05/2020    AST 19 02/05/2020    ALKPHOS 102 02/05/2020    BILITOT 0.6 02/05/2020         Current Medications     Outpatient Medications Prior to Visit   Medication Sig Dispense Refill     ACCU-CHEK FASTCLIX LANCET DRUM TESTING FOUR TIMES DAILY 400 each 1     ACCU-CHEK SOFTCLIX LANCETS lancets TESTING FOUR TIMES DAILY 400 each 0     aspirin 81 mg chewable tablet Chew.       atorvastatin (LIPITOR) 20 MG tablet Take 1 tablet (20 mg total) by mouth daily. 90 tablet 3     cholecalciferol, vitamin D3, 50 mcg (2,000 unit) Tab TAKE ONE TABLET BY MOUTH DAILY 90 tablet 3     DAILY VITES/IRON Tab tablet TAKE ONE TABLET DAILY 90 tablet 3     ergocalciferol  "(ERGOCALCIFEROL) 50,000 unit capsule TAKE 1 CAPSULE BY MOUTH 1 TIME A WEEK       flash glucose scanning reader (FREESTYLE [place] 14 DAY READER) Misc Use 1 each As Directed continuous. Us to read blood glucose Per 's instructions 1 each 0     flash glucose sensor (FREESTYLE [place] 14 DAY SENSOR) Kit Use 1 each As Directed every 14 (fourteen) days. Change every 14 days Per 's instructions 2 kit 5     levothyroxine (SYNTHROID, LEVOTHROID) 75 MCG tablet TK ONE T D AT LEAST ONE HOUR BEFORE OR TWO HOURS AFTER A MEAL 90 tablet 3     lisinopriL (PRINIVIL,ZESTRIL) 20 MG tablet Take 1 tablet (20 mg total) by mouth daily. 90 tablet 3     ONETOUCH ULTRA BLUE TEST STRIP strips TEST THREE TIMES DAILY 300 strip 0     pantoprazole (PROTONIX) 40 MG tablet Take 1 tablet (40 mg total) by mouth daily. 90 tablet 2     pen needle, diabetic (BD ULTRA-FINE BRANDEN PEN NEEDLE) 32 gauge x 5/32\" Ndle Use 1 each As Directed 4 (four) times a day. 100 each 6     insulin glargine (BASAGLAR KWIKPEN) 100 unit/mL (3 mL) pen ADMINISTER 20 UNITS UNDER THE SKIN TWICE DAILY 30 mL 0     NOVOLOG FLEXPEN U-100 INSULIN 100 unit/mL (3 mL) injection pen INJECT 1 UNIT PER 7 GRAMS CARBS AS DIRECTED. MAX 60 UNITS DAILY 18 mL 3     glucagon, human recombinant, (GLUCAGON) 1 mg injection Use for severe hypoglycemia       traZODone (DESYREL) 100 MG tablet TAKE ONE TABLET AT BEDTIME AS NEEDED FOR SLEEP 90 tablet 1     No facility-administered medications prior to visit.                Video-Visit Details    Type of service:  Video Visit    Video End Time (time video stopped): 1042  Originating Location (pt. Location): Home    Distant Location (provider location):  [facility]     Platform used for Video Visit: Jeff RODRIGUEZ      "

## 2021-06-12 NOTE — TELEPHONE ENCOUNTER
Covering MD please review and advise if ok to order a fasting Lipid profile for this patient.    Per last lab note on 2/5/20    Labs look pretty good here.  Cholesterol is a little more unfavorable than it has in the past, possibly due to your weight gain.  Please be more vigilant about a Mediterranean diet and exercise.  Borderline too much thyroid but I am not going to change anything today.  We will recheck again next visit.

## 2021-06-12 NOTE — TELEPHONE ENCOUNTER
RN cannot approve Refill Request    RN can NOT refill this medication med is not covered by policy/route to provider. Last office visit: 2/5/2020 Masood Melton MD Last Physical: Visit date not found Last MTM visit: Visit date not found Last visit same specialty: 2/5/2020 Masood Melton MD.  Next visit within 3 mo: Visit date not found  Next physical within 3 mo: Visit date not found      Catina Medrano, [facility] Triage/Med Refill 10/18/2020    Requested Prescriptions   Pending Prescriptions Disp Refills     pantoprazole (PROTONIX) 40 MG tablet 90 tablet 2     Sig: Take 1 tablet (40 mg total) by mouth daily.       GI Medications Refill Protocol Passed - 10/16/2020  4:51 PM        Passed - PCP or prescribing provider visit in last 12 or next 3 months.     Last office visit with prescriber/PCP: 2/5/2020 Masood Melton MD OR same dept: 2/5/2020 Masood Melton MD OR same specialty: 2/5/2020 Masood Melton MD  Last physical: Visit date not found Last MTM visit: Visit date not found   Next visit within 3 mo: Visit date not found  Next physical within 3 mo: Visit date not found  Prescriber OR PCP: Masood Melton MD  Last diagnosis associated with med order: 1. Gastroesophageal reflux disease  - pantoprazole (PROTONIX) 40 MG tablet; Take 1 tablet (40 mg total) by mouth daily.  Dispense: 90 tablet; Refill: 2    If protocol passes may refill for 12 months if within 3 months of last provider visit (or a total of 15 months).

## 2021-06-13 ENCOUNTER — HEALTH MAINTENANCE LETTER (OUTPATIENT)
Age: 66
End: 2021-06-13

## 2021-06-14 NOTE — PROGRESS NOTES
"Corinna Farias is a 65 y.o. female who is being evaluated via a billable telephone visit.      The patient has been notified of following:     \"This telephone visit will be conducted via a call between you and your physician/provider. We have found that certain health care needs can be provided without the need for a physical exam.  This service lets us provide the care you need with a short phone conversation.  If a prescription is necessary we can send it directly to your pharmacy.  If lab work is needed we can place an order for that and you can then stop by our lab to have the test done at a later time.    Telephone visits are billed at different rates depending on your insurance coverage. During this emergency period, for some insurers they may be billed the same as an in-person visit.  Please reach out to your insurance provider with any questions.    If during the course of the call the physician/provider feels a telephone visit is not appropriate, you will not be charged for this service.\"    Patient has given verbal consent to a Telephone visit? Yes    What phone number would you like to be contacted at? [phone]    Patient would like to receive their AVS by AVS Preference: Keshia.    Additional provider notes: Corinna joins me in a telephone visit.  She has history of GERD.  We cut back her PPI to every other day and she did okay but then she discontinued about a month ago she has had reflux a few days a week since then.  She wants to discuss alternatives.  She is never taken H2 blocker that she remembers.    Assessment/Plan:  1. Gastroesophageal reflux disease, unspecified whether esophagitis present  Trial of Pepcid once or twice a day.  She will start off with once a day.  Think this is a safer alternative.  If that does not fully cover her symptoms then I will resume the PPI and I discussed that with her.  She should let me know if she is getting breakthrough on the H2 blocker.  - famotidine " (PEPCID) 20 MG tablet; Take 1 tablet (20 mg total) by mouth 2 (two) times a day as needed for heartburn.  Dispense: 60 tablet; Refill: 1      Phone call duration:  5 minutes

## 2021-06-14 NOTE — TELEPHONE ENCOUNTER
Refill Approved    Rx renewed per Medication Renewal Policy. Medication was last renewed on 4/14/20.    Whit Fuentes, [facility] Triage/Med Refill 1/8/2021     Requested Prescriptions   Pending Prescriptions Disp Refills     lisinopriL (PRINIVIL,ZESTRIL) 20 MG tablet [Pharmacy Med Name: LISINOPRIL 20MG TABLETS] 90 tablet 3     Sig: TAKE ONE TABLET DAILY       Ace Inhibitors Refill Protocol Passed - 1/8/2021  5:29 AM        Passed - PCP or prescribing provider visit in past 12 months       Last office visit with prescriber/PCP: 2/5/2020 Masood Melton MD OR same dept: 2/5/2020 Masood Melton MD OR same specialty: 2/5/2020 Masood Melton MD  Last physical: 10/20/2020 Last MTM visit: Visit date not found   Next visit within 3 mo: Visit date not found  Next physical within 3 mo: Visit date not found  Prescriber OR PCP: Masood Melton MD  Last diagnosis associated with med order: 1. Essential hypertension, benign  - lisinopriL (PRINIVIL,ZESTRIL) 20 MG tablet [Pharmacy Med Name: LISINOPRIL 20MG TABLETS]; TAKE ONE TABLET DAILY  Dispense: 90 tablet; Refill: 3    If protocol passes may refill for 12 months if within 3 months of last provider visit (or a total of 15 months).             Passed - Serum Potassium in past 12 months     Lab Results   Component Value Date    Potassium 4.3 10/15/2020             Passed - Blood pressure filed in past 12 months     BP Readings from Last 1 Encounters:   10/20/20 122/84             Passed - Serum Creatinine in past 12 months     Creatinine   Date Value Ref Range Status   10/15/2020 0.93 0.60 - 1.10 mg/dL Final

## 2021-06-15 NOTE — TELEPHONE ENCOUNTER
LMTCB.  Please relay the following to pt:    Recent Cologuard test returned positive.  This means that abnormal DNA was found in the stool.  The test can not detect anything further than this.  Recommendation is to have a diagnostic colonoscopy to further assess. PCP will place order for pt if pt is agreeable.

## 2021-06-15 NOTE — TELEPHONE ENCOUNTER
Positive Cologuard Result:    Dr. Melton,    Patient has a positive colougard result. Report has been printed and sent to be scanned and tasked to you in Epic.

## 2021-06-15 NOTE — TELEPHONE ENCOUNTER
We have discussed this in the past-- in order for her to have Cologuard she had to agree to do a colonoscopy if it came back positive.  We will work hard with her endocrinology provider to keep her sugars good during the procedure.  It is very important however that we do the colonoscopy because she could have a colon cancer present and we need to further evaluate.  Please place order.

## 2021-06-15 NOTE — TELEPHONE ENCOUNTER
Pt notified results/recommendations and verbalized understanding. Visit offered to discuss her concerns however patient declines at this time. She reports that she is just nervious but will proceed with colonoscopy due to positive cologuard.

## 2021-06-15 NOTE — PROGRESS NOTES
"Assessment:   Corinna arrived today with her T-slim pump and Dexcom CGM.  Pt stated she will have a hypoglycemic episode approximately 2x/week.  She is able to treat appropriately.  Unable to download pump today due to equipment failure. Discussed importance of avoiding hypoglycemia and checking for pattern.    Pt is not aware of a pattern of hypoglycemia and stated she reviews data every few weeks.    Basal and bolus amounts are listed below.  She could not recall correction dose.  She is counting  the correct amount of CHO based on recall and  appropriately  changing infusion sites.  Reviewed testing for ketones and avoiding hyperglycemia.  She has syringes at home.    Pt noted that she has \"type 1 diabetes\" and wanted Dr Melton to be aware of this.  She would like the dx changed in her chart.  Pt stated that she was \"tested\"  at time of dx and retested recently, although did not state which labs were performed.  No results of MILAGROS or c-peptide available in chart.    Of note, basal dose of insulin over 30 day hx was only 30 % of insulin total.  Bolus dose was closer to 70 % of insulin total.  She is agreeable to f/u with Kamille Baig CNP at scheduled appt to determine if download available and if basal insulin should be increased.    She stated she would like to lose weight and start exercising.  Briefly discussed the benefits of both.  She had no further questions.    Pt stated that she needed to return home due to the weather today.      Plan: see goals    Subjective and Objective:      Corinna Farias is referred by Dr Melton for Diabetes Education.     Lab Results   Component Value Date    HGBA1C 6.5 (H) 01/17/2018     Meals;  B-2 oz -4 oz juice or eggs with sausage and 2 toast   L-noon protein with veg  D-salad or sand or soup with crackers   HS-popcorn or PB sand or desserts or lunch meat with cheese sand      works  7:30PM to 5 AM     Current diabetes medications:   Basal rates:  Total basal amount is " 19.19 Units  Time:  Units per hour  12:04 AM 1.4  3:04AM 1.5   6:02 AM     1.4   9:03 AM 1.350    Bolus total 43.06 Units  Novolog 1: 7   Correction -pt uncertain    Victoza 1.8 mg QD    Goals       monitoring            Avoid hypoglycemia.              Follow up:   Endocrinology      Education:     Monitoring   Meter (per above goals): Competent  Monitoring: Competent  BG goals: Assessed and Discussed    Nutrition Management  Nutrition Management: Assessed and Needs instruction/review at follow-up  Weight: Assessed  Portions/Balance: Assessed and Discussed  Carb ID/Count: Assessed and Discussed  Label Reading: Needs instruction/review at follow-up  Heart Healthy Fats: Needs instruction/review at follow-up  Menu Planning: Needs instruction/review at follow-up  Dining Out: Needs instruction/review at follow-up  Physical Activity: Assessed  Medications: Assessed  Orals: Not addressed  Injected Medications: Assessed and Discussed   Storage/Exp:Needs instruction/review at follow-up   Site Rotation: Assessed and Discussed   Sites Assessed: no    Diabetes Disease Process: Assessed    Acute Complications: Prevent, Detect, Treat:  Hypoglycemia: Assessed and Discussed  Hyperglycemia: Assessed and Discussed  Sick Days: Needs instruction/review at follow-up  Driving: Needs instruction/review at follow-up    Chronic Complications  Foot Care:Needs instruction/review at follow-up  Skin Care: Needs instruction/review at follow-up  Eye: Needs instruction/review at follow-up  ABC: Needs instruction/review at follow-up  Teeth:Needs instruction/review at follow-up  Goal Setting and Problem Solving: Needs instruction/review at follow-up  Barriers: Needs instruction/review at follow-up  Psychosocial Adjustments: Needs instruction/review at follow-up      Time spent with the patient: 60 minutes for diabetes education and counseling.   Previous Education: yes  Visit Type:DSMT  Hours Remaining: DSMT 1 and MNT 2      Jeanie Olmos RD,  PADDY JACKMAN  1/22/2018

## 2021-06-15 NOTE — TELEPHONE ENCOUNTER
Yes.  Please notify patient that her Cologuard was positive and that we need to proceed with colonoscopy.  Please place that order.  Diagnosis is positive Cologuard result.  Thank you.

## 2021-06-15 NOTE — TELEPHONE ENCOUNTER
Spoke to patient.  She states that years ago she was scheduled to have a colonoscopy.  She is a diabetic on insulin and states it was difficult to keep her blood sugar levels WNL while following the instructions to prepare for the procedure.    She did the best she could and when she got to the appointment, the provider told her he could not do the procedure as her bowel was not cleared enough.     She does not want to go through the preparation again at this time.

## 2021-06-15 NOTE — PROGRESS NOTES
Office Visit - New Patient   Corinna Farias   62 y.o.  female    Date of visit: 1/17/2018  Physician: Masood Melton MD     Assessment and Plan   1. Type 2 diabetes mellitus  It sounds like she is done a remarkable job of taking care of herself.  She maintains her A1c in the 7 range.  She needs to get set up with a new educator and new endocrinologist as her insurance will no longer cover the [facility] providers.  Labs as below.  I will see her back in 6 months.  - Comprehensive Metabolic Panel  - Glycosylated Hemoglobin A1c  - LDL Cholesterol, Direct  - Thyroid Stimulating Hormone (TSH)  - Ambulatory referral to Endocrinology  - Ambulatory referral to Diabetes Education (Existing Diagnosis)    2. GIOVANI (obstructive sleep apnea)  I would like her to get back into using her CPAP but she is resistant to that.    3. Colon cancer screening  She is not tolerated colonoscopy in the past.  Apparently her prep was not very good.  We will try to get those results.  Will get a [place] guard set up.  She understands that if positive she would need to proceed with a colonoscopy.  - Cologuard    4. Visit for screening mammogram    - Mammo Screening Bilateral; Future    5. Acute conjunctivitis of left eye, unspecified acute conjunctivitis type  Mild conjunctivitis.  Probably viral.  Given persistence in the drainage she was given some Polytrim eyedrops.  - polymyxin B-trimethoprim (FOR POLYTRIM) 10,000 unit- 1 mg/mL Drop ophthalmic drops; 1 drops to the affected eye(s) 4 (four) times a day for 7 days  Dispense: 1 Bottle; Refill: 0    6. Hypothyroid  Check TSH.    7. Morbid obesity  Urged dietary compliance and activity as tolerated.    8. Vitamin D deficiency  Apparently has vitamin D deficiency.  I will recheck that at some point.    9. Gastroesophageal reflux disease, esophagitis presence not specified  After her takedown of her gastric stapling, she has had reflux.  Well controlled with pantoprazole.  Continue same.   Consider a taper at a future date.    10. Tachycardia  EKG reveals sinus tach with some ST nonspecific changes.  I suspect this is a result of deconditioning and her obesity.  She has no cardiovascular symptoms at this spot time.  No chest pains or shortness of breath.  We will try to review her records and see what kind of cardiac workup if any she has had and what blood pressure and heart rate have been in the past.  - Electrocardiogram Perform and Read    The following high BMI interventions were performed this visit: encouragement to exercise, weight monitoring and weight loss from baseline weight    Return in about 6 months (around 7/17/2018) for Recheck.     Chief Complaint   Chief Complaint   Patient presents with     [facility]     self-referred - change PCP due to insurance     Conjunctivitis     Lt pink eye x 4 days. Sxs; mild pain, drainage        Patient Profile   Social History     Social History Narrative     No narrative on file        Past Medical History   Patient Active Problem List   Diagnosis     Type 2 diabetes mellitus     GIOVANI (obstructive sleep apnea)     Morbid obesity     Hypothyroid     Vitamin D deficiency     GERD (gastroesophageal reflux disease)       Past Surgical History  She has a past surgical history that includes Cholecystectomy; Gastric restriction surgery; and gastric restriction takedown.     History of Present Illness   This 62 y.o. old complicated woman comes in today to establish care.  Formally followed for many years in the [facility] system but she had an insurance change.  Unfortunately we are unable to access her records today.  Extensive visit reviewing her history.  She has diabetes since about 2004.  She was hospitalized at the time of diagnosis.  It sounds like she has been insulin-dependent since then.  She is struggled with morbid obesity.  She is status post gastric stapling in the 1980s but had a reversal of that a few years ago and has subsequently  "gained even more weight and is up 50 pounds.  She uses a continuous glucose monitor as well as insulin pump.  She is also been maintained on Victoza and attempt for her to lose weight but she states that has not helped.  She tries to do his best she can with diet but does do a lot of snacking.  Does not do a lot of exercise.  She has had a cold recently and notes redness and drainage of her left eye.  She has sleep apnea but is currently not treated.  She states that she was told to use CPAP but with her 's work schedule she is unable to utilize it.  It is unclear what she means by that.    She grew up in [place].  Parents sound like they were unwell.  Of dad  of diabetes and he was only 70.  Mother committed suicide in a nursing home.  She has 3 sisters and one brother.  Apparently relatively healthy.  She does not know much about their health history it sounds like though.  She is .  She is currently not working.   works.  They live in an apartment.  She is a non-smoker nondrinker.  She did do office/computer work in the past.  She likes to read watch TV and work on a computer.    Review of Systems: A comprehensive review of systems was negative except as noted.     Medications and Allergies   Current Outpatient Prescriptions   Medication Sig Dispense Refill     ACCU-CHEK SOFTCLIX LANCETS lancets USE TO TEST QID  11     atorvastatin (LIPITOR) 20 MG tablet Take 1 tablet by mouth daily.  0     BD ULTRA-FINE BRANDEN PEN NEEDLES 32 gauge x 5/32\" Ndle Used as directed       DAILY VITES/IRON Tab tablet Take 1 tablet by mouth daily.  2     ergocalciferol (ERGOCALCIFEROL) 50,000 unit capsule TK 1 C PO 1 TIME A WEEK  3     levothyroxine (SYNTHROID, LEVOTHROID) 75 MCG tablet TK ONE T D AT LEAST ONE HOUR BEFORE OR TWO HOURS AFTER A MEAL  11     lisinopril (PRINIVIL,ZESTRIL) 10 MG tablet Take 1 tablet by mouth daily.  3     NOVOLOG 100 unit/mL injection ADM UP  UNI VIA INSULIN PUMP DAILY.  " "11     NOVOLOG FLEXPEN 100 unit/mL injection pen INJECT 1 UNIT PER 7GM CARBS PLUS CORRECTION UP TO 60 UNITS D  4     NYSTOP powder KOJO TO SKIN FOLDS RASH D PRN  5     ONETOUCH DELICA LANCETS 33 gauge Misc USE TO TEST QID  7     ONETOUCH ULTRA TEST strips USE TO TEST QID  8     ONETOUCH ULTRA2 Test qid  0     pantoprazole (PROTONIX) 40 MG tablet Take 1 tablet by mouth daily.  3     traZODone (DESYREL) 100 MG tablet TK 1 T PO HS PRF SLP  3     VICTOZA 3-CAROLA 0.6 mg/0.1 mL (18 mg/3 mL) PnIj injection ADM 1.8 MG SC D  11     polymyxin B-trimethoprim (FOR POLYTRIM) 10,000 unit- 1 mg/mL Drop ophthalmic drops 1 drops to the affected eye(s) 4 (four) times a day for 7 days 1 Bottle 0     No current facility-administered medications for this visit.      No Known Allergies     Family and Social History   No family history on file.     Social History   Substance Use Topics     Smoking status: Never Smoker     Smokeless tobacco: Never Used     Alcohol use No        Physical Exam   General Appearance:   Morbidly obese woman in no distress.  She utilizes a walker to get around.    /78 (Patient Site: Right Arm, Patient Position: Sitting, Cuff Size: Adult Large)  Pulse (!) 106  Temp 98.6  F (37  C)  Ht 5' 5\" (1.651 m)  Wt (!) 339 lb (153.8 kg)  SpO2 98%  BMI 56.41 kg/m2    EYES: Eyelids, conjunctiva, and sclera were normal. Pupils were surgical.  Cornea, iris, and lens were normal bilaterally.  HEAD, EARS, NOSE, MOUTH, AND THROAT: Head and face were normal. Hearing was normal to voice and the ears were normal to external exam. Nose appearance was normal and there was no discharge. Oropharynx was normal.  NECK: Neck appearance was normal. There were no neck masses and the thyroid was not enlarged.  RESPIRATORY: Breathing pattern was normal and the chest moved symmetrically.  Percussion/auscultatory percussion was normal.  Lung sounds were normal and there were no abnormal sounds.  CARDIOVASCULAR: Heart rate and rhythm " tachycardic, regular..  S1 and S2 were normal and there were no extra sounds or murmurs. Peripheral pulses in arms and legs were normal.  Jugular venous pressure was normal.  There was no peripheral edema.  GASTROINTESTINAL: The abdomen was obese in contour.  Bowel sounds were present.  Percussion detected no organ enlargement or tenderness.  Palpation detected no tenderness, mass, or enlarged organs.   MUSCULOSKELETAL: Skeletal configuration was normal and muscle mass was normal for age. Joint appearance was overall normal.  LYMPHATIC: There were no enlarged nodes.  SKIN/HAIR/NAILS: Skin color was normal.  There were no skin lesions.  Hair and nails were normal.  NEUROLOGIC: The patient was alert and oriented to person, place, time, and circumstance. Speech was normal. Cranial nerves were normal. Motor strength was normal for age. The patient was normally coordinated.  PSYCHIATRIC:  Mood and affect were normal and the patient had normal recent and remote memory. The patient's judgment and insight were normal.    ADDITIONAL VITAL SIGNS: None  CHEST WALL/BREASTS: Deferred  RECTAL: Deferred  GENITAL/URINARY: Deferred     Additional Information     Review and/or order of clinical lab tests:  Review and/or order of radiology tests:  Review and/or order of medicine tests:  Discussion of test results with performing physician:  Decision to obtain old records and/or obtain history from someone other than the patient: Done  Review and summarization of old records and/or obtaining history from someone other than the patient and.or discussion of case with another health care provider:  Independent visualization of image, tracing or specimen itself:    Time: total time spent with the patient was 60 minutes of which >50% was spent in counseling and coordination of care     Masood Melton MD  Internal Medicine  Contact me at [phone]

## 2021-06-15 NOTE — TELEPHONE ENCOUNTER
Pt called back and she is agreeable to have colonoscopy but is hesitant as well.  She is very worried about her sugars.    Please place order for Colonoscopy and anything else she needs to do prior to there having the procedure.

## 2021-06-15 NOTE — TELEPHONE ENCOUNTER
Refill Approved    Rx renewed per Medication Renewal Policy. Medication was last renewed on 9/14/20, last OV 12/28/20.    Jeimy Horan, [facility] Triage/Med Refill 3/6/2021     Requested Prescriptions   Pending Prescriptions Disp Refills     traZODone (DESYREL) 100 MG tablet [Pharmacy Med Name: TRAZODONE 100MG TABLETS] 90 tablet 1     Sig: TAKE ONE TABLET AT BEDTIME AS NEEDED FOR SLEEP       Tricyclics/Misc Antidepressant/Antianxiety Meds Refill Protocol Passed - 3/5/2021  8:51 AM        Passed - PCP or prescribing provider visit in last year     Last office visit with prescriber/PCP: 2/5/2020 Masood Melton MD OR same dept: Visit date not found OR same specialty: 2/5/2020 Masood Melton MD  Last physical: 10/20/2020 Last MTM visit: Visit date not found   Next visit within 3 mo: Visit date not found  Next physical within 3 mo: Visit date not found  Prescriber OR PCP: Masood Melton MD  Last diagnosis associated with med order: 1. Chronic insomnia  - traZODone (DESYREL) 100 MG tablet [Pharmacy Med Name: TRAZODONE 100MG TABLETS]; TAKE ONE TABLET AT BEDTIME AS NEEDED FOR SLEEP  Dispense: 90 tablet; Refill: 1    If protocol passes may refill for 12 months if within 3 months of last provider visit (or a total of 15 months).

## 2021-06-16 ENCOUNTER — COMMUNICATION - HEALTHEAST (OUTPATIENT)
Dept: ENDOCRINOLOGY | Facility: CLINIC | Age: 66
End: 2021-06-16

## 2021-06-16 DIAGNOSIS — E11.8 TYPE 2 DIABETES MELLITUS WITH COMPLICATION, WITH LONG-TERM CURRENT USE OF INSULIN (H): ICD-10-CM

## 2021-06-16 DIAGNOSIS — Z79.4 TYPE 2 DIABETES MELLITUS WITH COMPLICATION, WITH LONG-TERM CURRENT USE OF INSULIN (H): ICD-10-CM

## 2021-06-16 NOTE — TELEPHONE ENCOUNTER
Telephone Encounter by Frida Sanchez at 5/14/2019 12:19 PM     Author: Frida Sanchez Service: -- Author Type: Financial Resource Guide    Filed: 5/14/2019 12:20 PM Encounter Date: 5/14/2019 Status: Signed    : Frida Sanchez (Financial Resource Guide)

## 2021-06-16 NOTE — TELEPHONE ENCOUNTER
Telephone Encounter by Kamille Monroe CMA at 3/19/2019  7:25 AM     Author: Kamille Monroe CMA Service: -- Author Type: Medical Assistant    Filed: 3/19/2019  7:26 AM Encounter Date: 3/18/2019 Status: Signed    : Kamille Monroe CMA (Medical Assistant)       Dr. Melton,  This is the message I received this morning:  Rossy Baig NP   You 52 minutes ago (6:32 AM)      I believe that this was sent to Dr Melton because he has seen her in the last year for her diabetes, and I haven't.     Kamille      She has placed the orders that were needed.  Kamille DAWKINS CMA/ELVIRA....................7:26 AM

## 2021-06-16 NOTE — PROGRESS NOTES
[facility]  [address] [facility] [address]  Dept: [phone]  Dept Fax: [phone]  Primary Provider: Magali Melton MD  Pre-op Performing Provider: MAGALI MELTON      PREOPERATIVE EVALUATION:  Today's date: 4/20/2021    Corinna Farias is a 65 y.o. female who presents for a preoperative evaluation.    Surgical Information:  Surgery/Procedure:  Colonoscopy   Surgery Location: [facility]   Surgeon: Dr. Iris Sebastian   Surgery Date: 5/4/21  Time of Surgery: TBD  Where patient plans to recover: At home with family  Fax number for surgical facility: Note does not need to be faxed, will be available electronically in Epic.    Type of Anesthesia Anticipated: to be determined    1. Preop general physical exam  Proceed with surgery as planned.  She was given perioperative med management instructions.  I did ask her to contact her endocrinology provider to give her insulin instructions.  - Electrocardiogram Perform and Read    2. Positive colorectal cancer screening using Cologuard test  As above.    3. Type 2 diabetes mellitus with complication, with long-term current use of insulin (H)  She will be following with endocrinology soon.  She is to discuss insulin regimen during and prior to the procedure with her provider.    4. Essential hypertension, benign  She will take her medication preoperatively.    5. GIOVANI (obstructive sleep apnea)  This is untreated.  Watch for hypoxia during anesthesia.    6. Hypothyroidism, unspecified type  She will take her levothyroxine perioperatively.    Subjective     HPI related to upcoming procedure: Corinna is a very pleasant woman with morbid obesity and insulin requiring diabetes.  She also has untreated sleep apnea.  She had a positive Cologuard and is to undergo diagnostic colonoscopy for further evaluation.  She has been feeling well.  Her reflux has been an issue.  We have her on Protonix for years.  I tried to step  her down to Pepcid but that was not adequate and she now wants to go back on Protonix.  Otherwise she offers no concerns.  She follows with endocrinology for her diabetes.  She is in the middle of her Covid vaccination regimen.    Preop Questions 4/20/2021   Have you ever had a heart attack or stroke? No   Have you ever had surgery on your heart or blood vessels, such as a stent placement, a coronary artery bypass, or surgery on an artery in your head, neck, heart, or legs? No   Do you have chest pain with activity? No   Do you have a history of  heart failure? No   Do you currently have a cold, bronchitis or symptoms of other infection? No   Do you have a cough, shortness of breath, or wheezing? No   Do you or anyone in your family have previous history of blood clots? No   Do you or does anyone in your family have a serious bleeding problem such as prolonged bleeding following surgeries or cuts? No   Have you ever had problems with anemia or been told to take iron pills? No   Have you had any abnormal blood loss such as black, tarry or bloody stools, or abnormal vaginal bleeding? No   Have you ever had a blood transfusion? No   Are you willing to have a blood transfusion if it is medically needed before, during, or after your surgery? Yes   Have you or any of your relatives ever had problems with anesthesia? No   Do you have sleep apnea, excessive snoring or daytime drowsiness? Yes, not using CPAP machine    Do you have any artifical heart valves or other implanted medical devices like a pacemaker, defibrillator, or continuous glucose monitor? No   Do you have artificial joints? No   Are you allergic to latex? No     Health Care Directive:  Patient does not have a Health Care Directive or Living Will: Discussed advance care planning with patient; information given to patient to review.    Preoperative Review of :    reviewed - no record of controlled substances prescribed.    See problem list for active  medical problems.  Problems all longstanding and stable, except as noted/documented.  See ROS for pertinent symptoms related to these conditions.      Review of Systems  Constitutional, neuro, ENT, endocrine, pulmonary, cardiac, gastrointestinal, genitourinary, musculoskeletal, integument and psychiatric systems are negative, except as otherwise noted.      Patient Active Problem List    Diagnosis Date Noted     Microalbuminuric diabetic nephropathy (H) 10/20/2020     Essential hypertension, benign 01/24/2019     Type 2 diabetes mellitus with complication, with long-term current use of insulin (H) 01/17/2018     GIOVANI (obstructive sleep apnea) 01/17/2018     Morbid obesity (H) 01/17/2018     Hypothyroid 01/17/2018     Vitamin D deficiency 01/17/2018     GERD (gastroesophageal reflux disease) 01/17/2018     Insomnia 05/20/2015     Bariatric surgery status 10/17/2014     Cervical cancer screening 09/12/2014     Female climacteric state 08/10/2011     Pain in joint involving lower leg 04/08/2011     Pseudophakia 06/04/2009     Past Medical History:   Diagnosis Date     Chronic back pain      GERD (gastroesophageal reflux disease)      Hypercholesteremia      Hyperlipidemia      Hypertension      Hypothyroid      GIOVANI (obstructive sleep apnea)     not on CPAP     Vitamin D deficiency      Past Surgical History:   Procedure Laterality Date     CHOLECYSTECTOMY       GASTRIC BYPASS       GASTRIC RESTRICTION SURGERY       gastric restriction takedown       Current Outpatient Medications   Medication Sig Dispense Refill     ACCU-CHEK FASTCLIX LANCET DRUM TESTING FOUR TIMES DAILY 400 each 1     ACCU-CHEK SOFTCLIX LANCETS lancets TESTING FOUR TIMES DAILY 400 each 0     aspirin 81 mg chewable tablet Chew.       atorvastatin (LIPITOR) 20 MG tablet Take 1 tablet (20 mg total) by mouth daily. 90 tablet 3     cholecalciferol, vitamin D3, 50 mcg (2,000 unit) Tab TAKE ONE TABLET BY MOUTH DAILY 90 tablet 3     DAILY VITES/IRON Tab tablet  "TAKE ONE TABLET DAILY 90 tablet 3     flash glucose scanning reader (FREESTYLE [place] 14 DAY READER) Misc Use 1 each As Directed continuous. Us to read blood glucose Per 's instructions 1 each 0     flash glucose sensor (FREESTYLE [place] 14 DAY SENSOR) Kit Use 1 each As Directed every 14 (fourteen) days. Change every 14 days Per 's instructions 2 kit 5     glucagon (GVOKE HYPOPEN 2-PACK) 0.5 mg/0.1 mL atIn Inject 0.5 mg under the skin as needed. 2 Syringe 1     glucagon, human recombinant, (GLUCAGON) 1 mg injection Use for severe hypoglycemia       insulin aspart U-100 (NOVOLOG FLEXPEN U-100 INSULIN) 100 unit/mL (3 mL) injection pen Use with CHO ratio of 1:7, up to 60 units a day 18 mL 1     insulin glargine (BASAGLAR KWIKPEN) 100 unit/mL (3 mL) pen Take 40 units in the morning and 40 units in the evening. 30 mL 1     levothyroxine (SYNTHROID, LEVOTHROID) 75 MCG tablet TK ONE T D AT LEAST ONE HOUR BEFORE OR TWO HOURS AFTER A MEAL 90 tablet 3     lisinopriL (PRINIVIL,ZESTRIL) 20 MG tablet TAKE ONE TABLET DAILY 90 tablet 2     ONETOUCH ULTRA BLUE TEST STRIP strips TEST THREE TIMES DAILY 300 strip 0     pantoprazole (PROTONIX) 40 MG tablet Take 1 tablet (40 mg total) by mouth daily. 90 tablet 2     pen needle, diabetic (BD ULTRA-FINE BRANDEN PEN NEEDLE) 32 gauge x 5/32\" Ndle Use 1 each As Directed 4 (four) times a day. 100 each 6     traZODone (DESYREL) 100 MG tablet TAKE ONE TABLET AT BEDTIME AS NEEDED FOR SLEEP 90 tablet 1     famotidine (PEPCID) 20 MG tablet Take 1 tablet (20 mg total) by mouth 2 (two) times a day as needed for heartburn. 60 tablet 1     No current facility-administered medications for this visit.        No Known Allergies    Social History     Tobacco Use     Smoking status: Never Smoker     Smokeless tobacco: Never Used   Substance Use Topics     Alcohol use: No      No family history on file.  Social History     Substance and Sexual Activity   Drug Use No        Objective " "    /80 (Patient Site: Left Arm, Patient Position: Sitting, Cuff Size: Adult Regular)   Pulse 90   Temp 97.2  F (36.2  C)   Ht 5' 5\" (1.651 m)   Wt (!) 359 lb (162.8 kg)   SpO2 96%   BMI 59.74 kg/m    Physical Exam    GENERAL APPEARANCE: obese, alert and no distress     EYES: EOMI, PERRL     HENT: ear canals and TM's normal and nose and mouth without ulcers or lesions     NECK: no adenopathy, no asymmetry, masses, or scars and thyroid normal to palpation     RESP: lungs clear to auscultation - no rales, rhonchi or wheezes     CV: regular rates and rhythm, normal S1 S2, no S3 or S4 and no murmur, click or rub     ABDOMEN:  Obese, soft, nontender, no HSM or masses and bowel sounds normal     MS: extremities normal- no gross deformities noted, no evidence of inflammation in joints, FROM in all extremities.     SKIN: no suspicious lesions or rashes     NEURO: Normal strength and tone, sensory exam grossly normal, mentation intact and speech normal     PSYCH: mentation appears normal. and affect normal/bright     LYMPHATICS: No cervical adenopathy    Recent Labs   Lab Test 10/15/20  0844 02/05/20  1128    137   K 4.3 4.5   CREATININE 0.93 0.94        PRE-OP Diagnostics:   No labs were ordered during this visit.  EKG: appears normal, NSR    REVISED CARDIAC RISK INDEX (RCRI)   The patient has the following serious cardiovascular risks for perioperative complications:   - Diabetes Mellitus (on Insulin) = 1 point    RCRI INTERPRETATION: 1 point: Class II (low risk - 0.9% complication rate)         Signed Electronically by: Masood Melton MD    Copy of this evaluation report is provided to requesting physician.                "

## 2021-06-16 NOTE — TELEPHONE ENCOUNTER
Telephone Encounter by Frida Sanchez at 5/15/2019  9:57 AM     Author: Frida Sanchez Service: -- Author Type: Financial Resource Guide    Filed: 5/16/2019  9:25 AM Encounter Date: 5/14/2019 Status: Addendum    : Frida Sanchez (Financial Resource Guide)    Related Notes: Original Note by Frida Sanchez (Financial Resource Guide) filed at 5/15/2019 10:12 AM       Medication: Victoza  Qty: 9ml for 30 days  Effective Dates: 02/14/2019 - 05/14/2020  Pharmacy: [facility] and informed PA approved  Insurance: [employer] [place] PMAP  Copay Amount: $25.00  Copay Assistance: Not available for medicaid insurance plans  Patient Notified? Yes - pharmacy to notify patient

## 2021-06-16 NOTE — PROGRESS NOTES
"OFFICE VISIT NOTE    Subjective:   Chief Complaint:  Follow-up (runny nose, ST on/off X December, has been treating pinkeye since Dec also)    62-year-old woman is in with complaint of having possibly 3 months of irritation of both eyes.  She describes some redness of the eyes and some crusting of her eyes when she wakes up in the morning.  She has been treated with antibiotic drops without any improvement.  She is also on a liquid tears substitute .  This is not helped much either.  She denies any itching of the eyes.  Some burning during the day.    Current Outpatient Prescriptions   Medication Sig     ACCU-CHEK SOFTCLIX LANCETS lancets USE TO TEST QID     atorvastatin (LIPITOR) 20 MG tablet Take 1 tablet by mouth daily.     BD ULTRA-FINE BRANDEN PEN NEEDLES 32 gauge x 5/32\" Ndle Used as directed     DAILY VITES/IRON Tab tablet Take 1 tablet by mouth daily.     ergocalciferol (ERGOCALCIFEROL) 50,000 unit capsule TK 1 C PO 1 TIME A WEEK     levothyroxine (SYNTHROID, LEVOTHROID) 75 MCG tablet TK ONE T D AT LEAST ONE HOUR BEFORE OR TWO HOURS AFTER A MEAL     lisinopril (PRINIVIL,ZESTRIL) 10 MG tablet Take 1 tablet by mouth daily.     NOVOLOG 100 unit/mL injection ADM UP  UNI VIA INSULIN PUMP DAILY.     NOVOLOG FLEXPEN 100 unit/mL injection pen INJECT 1 UNIT PER 7GM CARBS PLUS CORRECTION UP TO 60 UNITS D; IF INSULIN PUMP FAILS     NYSTOP powder KOJO TO SKIN FOLDS RASH D PRN     ONETOUCH DELICA LANCETS 33 gauge Misc USE TO TEST QID     ONETOUCH ULTRA TEST strips Test 5-6x daily     pantoprazole (PROTONIX) 40 MG tablet Take 1 tablet by mouth daily.     polymyxin B-trimethoprim (FOR POLYTRIM) 10,000 unit- 1 mg/mL Drop ophthalmic drops 1 drops to the affected eye(s) 4 (four) times a day for 7 days     traZODone (DESYREL) 100 MG tablet TK 1 T PO HS PRF SLP     VICTOZA 3-CAROLA 0.6 mg/0.1 mL (18 mg/3 mL) PnIj injection ADM 1.8 MG SC D     ketotifen (ZADITOR) 0.025 % (0.035 %) ophthalmic solution 1 drop in each eye every 8 " "hours.       Review of Systems:  A comprehensive review of systems is negative except for the comments above    Objective:    Pulse (!) 111  Temp 97.8  F (36.6  C) (Oral)   Ht 5' 5\" (1.651 m)  Wt (!) 344 lb (156 kg)  SpO2 96%  BMI 57.24 kg/m2  GENERAL: No acute distress.  Vision seems okay.  She is diabetic but eyegrounds look okay.  There is some erythema on the lower part of the sclera and on the lids.  I do not see any signs of blepharitis.  There is no photophobia.  There is no mucopurulent drainage.  No sign of iritis.  ENT exam is normal except for some watery rhinorrhea.  Assessment & Plan   Corinna Farias is a 62 y.o. female.    Symptoms present for 3 months.  I am suspicious this could be an allergy or dry eyes.  She is already on lubricating eyedrops.  Going to add Zaditor which she can buy over-the-counter and see if this gives her some relief.  Also, try Zyrtec 1 tablet daily to see if the rhinorrhea that she has been experiencing for several months improves.    There are no diagnoses linked to this encounter.    Braeden Bahena MD  Transcription using voice recognition software, may contain typographical errors.    "

## 2021-06-16 NOTE — PROGRESS NOTES
[facility]  ENDOCRINOLOGY    Diabetes Note 3/1/2018    Corinna Farias, 1955, [id number]          Reason for visit      1. Type 2 diabetes mellitus        HPI     Corinna Farias is a very pleasant 62 y.o. old female who presents for follow up.  SUMMARY:  Corinna is here today in consultation.  She is a referral from Dr Melton, her PCP. She is using a T-Slim pump and Dexcom for her insulin management.  She has dropped her A1c from 7.2 to 6.5. She has been a Diabetic since 2004. She has brought in her meter which shows that she has had an ave BG of 127 over the last two weeks. 85% of her readings were in range.  11% were above range.  She is testing appropriately at 3 times a day. She had 2 episodes of hypoglycemia and she feels that she took too much insulin.  She reports that she has milk when she has hypoglycemia. She reports numbness on the bottom of her L foot.  She is up to date on her eye exams, and has had no problems.     Blood glucose data:  Ave:127, SD: 36    Insulin Pump Settings     Basal Rate  TIME Units/HR   0000 - 0300 1.4   0300 - 0600 1.5   0600 - 0900 1.4   0900 - 1800 1.35   1800 - 2200 1.35      2200 - 0000            1.30     Bolus: Insulin : CHO ratio  Time Units Grams CHO   0000 1 9                          Correction  #Units Blood glucose >Target BG   1 35 110         Past Medical History     Patient Active Problem List   Diagnosis     Type 2 diabetes mellitus     GIOVANI (obstructive sleep apnea)     Morbid obesity     Hypothyroid     Vitamin D deficiency     GERD (gastroesophageal reflux disease)        Family History       family history is not on file.    Social History      reports that she has never smoked. She has never used smokeless tobacco. She reports that she does not drink alcohol or use illicit drugs.      Review of Systems     Patient has no polyuria or polydipsia, no chest pain, dyspnea or TIA's, no numbness, tingling or pain in extremities  Remainder negative except as noted  "in HPI.    Vital Signs     /84 (Patient Site: Right Arm, Patient Position: Sitting, Cuff Size: Adult Regular) Comment (Patient Site): bp taken on forearm  Pulse 84  Ht 5' 5\" (1.651 m)  Wt (!) 344 lb 11.2 oz (156.4 kg)  BMI 57.36 kg/m2  Wt Readings from Last 3 Encounters:   02/27/18 (!) 344 lb 11.2 oz (156.4 kg)   01/17/18 (!) 339 lb (153.8 kg)       Physical Exam     Constitutional:  Well developed, Well nourished  HENT:  Normocephalic,   Neck: Thyroid normal, No lymph nodes, Supple  Eyes:  PERRL, Conjunctiva pink  Respiratory:  Normal breath sounds, No respiratory distress  Cardiovascular:  Normal heart rate, Normal rhythm, No murmurs  GI:  Bowel sounds normal, Soft, No tenderness  Musculoskeletal:  No gross deformity or lesions, normal dorsalis pedis pulses  Skin: No acanthosis nigricans, lipoatrophy or lipodystrophy      Assessment     1. Type 2 diabetes mellitus        Plan       Corinna is currently doing very well with her DM management.  She has dropped her A1c from 7.2 to 6.5.  No changes to her pump settings at present.  She will f/u with me in 3 months. Time spent with pt today: 30 min with >50% spent in counseling and coordination of care.              Rossy LESLIE Endocrinology  3/1/2018  1:09 PM        Lab Results     Hemoglobin A1c   Date Value Ref Range Status   01/17/2018 6.5 (H) 3.5 - 6.0 % Final     Creatinine   Date Value Ref Range Status   01/17/2018 0.96 0.60 - 1.10 mg/dL Final       No results found for: CHOL, HDL, LDLCALC, TRIG    Lab Results   Component Value Date    ALT 19 01/17/2018    AST 20 01/17/2018    ALKPHOS 136 (H) 01/17/2018    BILITOT 0.7 01/17/2018         Current Medications     Outpatient Medications Prior to Visit   Medication Sig Dispense Refill     ACCU-CHEK SOFTCLIX LANCETS lancets USE TO TEST QID  11     atorvastatin (LIPITOR) 20 MG tablet Take 1 tablet by mouth daily.  0     BD ULTRA-FINE BRANDEN PEN NEEDLES 32 gauge x 5/32\" Ndle Used as directed       DAILY " VITES/IRON Tab tablet Take 1 tablet by mouth daily.  2     ergocalciferol (ERGOCALCIFEROL) 50,000 unit capsule TK 1 C PO 1 TIME A WEEK  3     levothyroxine (SYNTHROID, LEVOTHROID) 75 MCG tablet TK ONE T D AT LEAST ONE HOUR BEFORE OR TWO HOURS AFTER A MEAL  11     lisinopril (PRINIVIL,ZESTRIL) 10 MG tablet Take 1 tablet by mouth daily.  3     NOVOLOG 100 unit/mL injection ADM UP  UNI VIA INSULIN PUMP DAILY.  11     NOVOLOG FLEXPEN 100 unit/mL injection pen INJECT 1 UNIT PER 7GM CARBS PLUS CORRECTION UP TO 60 UNITS D; IF INSULIN PUMP FAILS  4     NYSTOP powder KOJO TO SKIN FOLDS RASH D PRN  5     ONETOUCH DELICA LANCETS 33 gauge Misc USE TO TEST QID  7     ONETOUCH ULTRA TEST strips Test 5-6x daily  8     pantoprazole (PROTONIX) 40 MG tablet Take 1 tablet by mouth daily.  3     polymyxin B-trimethoprim (FOR POLYTRIM) 10,000 unit- 1 mg/mL Drop ophthalmic drops 1 drops to the affected eye(s) 4 (four) times a day for 7 days 1 Bottle 0     traZODone (DESYREL) 100 MG tablet TK 1 T PO HS PRF SLP  3     VICTOZA 3-CAROLA 0.6 mg/0.1 mL (18 mg/3 mL) PnIj injection ADM 1.8 MG SC D  11     ONETOUCH ULTRA2 Test qid  0     No facility-administered medications prior to visit.

## 2021-06-16 NOTE — PATIENT INSTRUCTIONS - HE
"  Preparing for Your Surgery  Getting started  A surgery nurse will call you to review your health history and instructions. They will give you an arrival time based on your scheduled surgery time.  Please be ready to share the following:    Your doctor's clinic name and phone number    Your medical, surgical and anesthesia history    A list of allergies and sensitivities    A list of medicines, including herbal treatments and over-the-counter drugs    Whether the patient has a legal guardian (ask how to send us the papers in advance)  If your child is having surgery, please ask for a copy of Preparing for Your Child's Surgery.    Preparing for surgery    Within 30 days of surgery: Have an exam at your family clinic (primary care clinic), or go to a pre-operative clinic. This exam is called a \"History and Physical,\" or H&P.    At your H&P exam, talk to your care team about all medicines you take. If you need to stop any medicines before surgery, ask when to start taking them again.  ? We do this for your safety. Many medicines can make you bleed too much during surgery. Some change how well surgery (anesthesia) drugs work.    Call your insurance company to see what it will and won't pay for. Ask if they need to pre-approve the surgery. (If no insurance, call [phone].)    Call your surgeon's clinic if there's any change in your health. This includes signs of a cold or flu (sore throat, runny nose, cough, rash, fever). It also includes a scrape or scratch near the surgery site.    If you have questions on the day of surgery, call your surgery center.  Eating and drinking guidelines  For your safety: Unless your surgeon tells you otherwise, follow the guidelines below.    Eat and drink as usual until 8 hours before surgery. After that, no food or milk.    Drink clear liquids until 2 hours before surgery. These are liquids you can see through, like water, Gatorade and Propel Water. You may also have black coffee " and tea (no cream or milk).    Nothing by mouth within 2 hours of surgery. This includes gum, candy and breath mints.    Stop alcohol the midnight before surgery.    If your family clinic tells you to take medicine on the morning of surgery, it's okay to take it with a sip of water.  Preventing infection    Shower or bathe the night before and morning of your surgery. Follow the instructions your clinic gave you. (If no instructions, use regular soap.)    Don't shave or clip hair near your surgery site. This can lead to skin infection.    Don't smoke the morning of surgery. Smoking increases the risk of infection. You may chew nicotine gum up to 2 hours before surgery. A nicotine patch is okay.  ? Note: Some surgeries require you to completely quit smoking and nicotine. Check with your surgeon.    Your care team will make every effort to keep you safe from infection. We will:  ? Clean our hands often with soap and water (or an alcohol-based hand rub).  ? Clean the skin at your surgery site with a special soap that kills germs. We'll also remove hair from the site as needed.  ? Wear special hair covers, masks, gowns and gloves during surgery.  ? Give antibiotic medicine, if prescribed. Not all surgeries need antibiotics.  What to bring on the day of surgery    Photo ID and insurance card    Copy of your health care directive, if you have one    Glasses and hearing aides (bring cases)  ? You can't wear contacts during surgery    Inhaler and eye drops, if you use them (tell us about these when you arrive)    CPAP machine or breathing device, if you use them    A few personal items, if spending the night    If you have . . .  ? A pacemaker or ICD (cardiac defibrillator): Bring the ID card.  ? An implanted stimulator: Bring the remote control.  ? A legal guardian: Bring a copy of the certified (court-stamped) guardianship papers.  Please remove any jewelry, including body piercings. Leave jewelry and other valuables at  home.  If you're going home the day of surgery  Important: If you don't follow the rules below, we must cancel your surgery.     Arrange for someone to drive you home after surgery. You may not drive, take a taxi or take public transportation by yourself (unless you'll have local anesthesia only).    Arrange for a responsible adult to stay with you overnight. If you don't, we may keep you in the hospital overnight, and you may need to pay the costs yourself.  Questions?   If you have any questions for your care team, list them here: _________________________________________________________________________________________________________________________________________________________________________________________________________________________________________________________________________________________________________________________  For informational purposes only. Not to replace the advice of your health care provider. Copyright   9021-8791 [employer]. All rights reserved. Clinically reviewed by Alyson Sow MD. SMARTworks [id number] - REV 07/19.      Before Your Procedure or Hospital Admission  Testing for COVID-19 (Coronavirus)  Thank you for choosing [facility] for your health care needs. This is a very challenging time for everyone. The World Health Organization and the State of [place] have declared the COVID-19 virus a pandemic.   Our goal is to keep you and our team here at [facility] safe and healthy. We've taken several steps to make this happen. For example:    We screen our staff, care teams and patients for COVID-19.    Everyone at [facility] must wear a mask and stay 6 feet apart.    We are limiting hospital and clinic visitors.  Before you come in  All patients must get tested for COVID-19. Your test needs to happen 2 to 4 days before you check in to the hospital or surgery site.   A clinic scheduler will call about a week in advance to set up a testing time at  "one of our labs where we'll take a swab of your nose or throat.  Note: If you go to a clinic or pharmacy like [facility] or [facility] for your test, make sure it's a \"RT-PCR\" test, not a \"rapid\" COVID-19 test. (See Questions and Answers below.)  After the test, please stay at home and stay out of contact with other people. It will be harder for you to recover if you get COVID-19 before your treatment.  Please follow all current safety guidelines, including:    Limit trips outside your home.    Limit the number of people you see.    Always wear a mask outside your home.    Use social distancing. (Stay 6 feet away from others whenever you can.)    Wash your hands often.  If your test shows you have COVID-19  If your test is positive, we'll let you know. A positive test means that you have the virus.   We'll probably have to postpone your admission, surgery or procedure. Your doctor will discuss this with you. After that, we'll let you know what to do and when you can reschedule.   We may need to cancel your treatment on short notice for other reasons, too.  If your test shows you DON'T have COVID-19  Even if your test is negative, you may still get COVID-19. It's rare but, sometimes, the test result is wrong. You could also catch the virus after taking the test.   There's a very small chance that you could catch COVID-19 in the hospital or surgery center. [facility] has taken many steps to prevent this from happening.   Day of your surgery or procedure    Please come wearing a mask or something else that covers both your nose and mouth.    When you arrive, we'll ask you some questions to find out if you have any signs or symptoms of COVID-19.    Ask your care team if you can have visitors. All visitors must wear masks and will be screened for signs of COVID-19.  ? Even if no visitors are allowed, you can still have with you:    Your legal guardian or legal decision maker    A parent and one other visitor, if you are " "younger than 18 years old    A partner and a , if you are in labor  ? We might need to teach you about taking care of yourself after surgery. If so, a visitor can come into the hospital to learn about it, too.  ? The rules for visitors change often, depending on how much the virus is spreading. To learn more, see Visiting a Loved One in the Hospital during the COVID-19 Outbreak.  Please call your care team, hospital or surgery center if you have any questions. We thank you for your understanding and for choosing [facility] for your care.   Questions and Answers  Does it matter where I get tested for COVID-19?  Yes. We urge you to get tested at one of our [facility] COVID-19 testing sites. We process these tests in our lab and can get the results quickly. Your [facility] care team needs to get your results before you check in.  What should I do if I can't get tested at [facility]?  You can get tested somewhere else, but you'll need to take these extra steps:  1. Contact your family doctor or clinic to arrange your test.  2. Take the test within 4 days of your surgery or procedure. We can't accept tests older than 4 days.  3. Make sure your doctor or clinic faxes your results to [facility] at [phone].  If we don't get your results in time, we may have to postpone or cancel your treatment.  Ask if you're getting a \"RT-PCR\" COVID-19 test. It should NOT be a \"rapid\" COVID-19 test. Many drug stores use \"rapid\" tests, but they may not be as accurate. We don't accept the results of \"rapid\" tests.  For informational purposes only. Not to replace the advice of your health care provider. Copyright   2020 [facility]. All rights reserved. Clinically reviewed by [facility] and the [facility] COVID-19 Clinical Team. Profilepasser [id number] - REV 10/20.    How to Take Your Medication Before Surgery      Contact Rossy Baig to discuss how to adjust/take your " insulin the day prior to and the day of your procedure.    Morning of surgery, please take your usual oral medicines with just a sip of water.

## 2021-06-16 NOTE — TELEPHONE ENCOUNTER
Refill Request  Did you contact pharmacy: N/A - Faxed med request sent from pharmacy on patient's behalf  Medication name:   Requested Prescriptions     Pending Prescriptions Disp Refills     TAB-A-MICHAEL MULTIVITAMIN W-IRON 15 mg iron- 400 mcg Tab 90 each 3     Sig: Take 1 tablet by mouth daily.     Who prescribed the medication: Not Listed  Requested Pharmacy: [facility]  Is patient out of medication: Unknown  Patient notified refills processed in 3 business days:  N/A - Faxed med request sent from pharmacy on patient's behalf  Okay to leave a detailed message: Unknown - N/A - Faxed med request sent from pharmacy on patient's behalf

## 2021-06-17 NOTE — TELEPHONE ENCOUNTER
Reason for Call:  Medication or medication refill:    Do you use a [facility]?  Name of the pharmacy and phone number for the current request: [email or web address]    Name of the medication requested: Tabavite with Iron           Call taken on 5/20/2021 at 12:42 PM by Bony Martins

## 2021-06-17 NOTE — TELEPHONE ENCOUNTER
RN cannot approve Refill Request    RN can NOT refill this medication med is not covered by policy/route to provider. Last office visit: 2/5/2020 Masood Melton MD Last Physical: 4/20/2021 Last MTM visit: Visit date not found Last visit same specialty: 2/5/2020 Masood Melton MD.  Next visit within 3 mo: Visit date not found  Next physical within 3 mo: Visit date not found      Lance Franks, [facility] Triage/Med Refill 4/27/2021    Requested Prescriptions   Pending Prescriptions Disp Refills     cholecalciferol, vitamin D3, 50 mcg (2,000 unit) Tab [Pharmacy Med Name: VITAMIN D 2,000UNIT TABLETS] 90 tablet 3     Sig: TAKE 1 TABLET BY MOUTH DAILY       There is no refill protocol information for this order

## 2021-06-17 NOTE — PROGRESS NOTES
"  Office Visit - Follow Up   Corinna Farias   62 y.o. female    Date of Visit: 4/20/2018    Chief Complaint   Patient presents with     Cough     Cough x 1 week stuffy nose, wheezing when breathing, not sure if allergies, just hanging on        Assessment and Plan   1. Allergic rhinitis  Symptoms present now since October/2017 she tried Zyrtec without much help.  I suggested she continue Zyrtec at 10 mg per day and will add some Flonase 2 puffs in each nostril daily.          No Follow-up on file.     History of Present Illness   This 62 y.o. old cat in September/2017.  Beginning in late October she developed some sneezing and plugged nose.  She has had a bit of a cough for the past week.  Tried DayQuil for the cough.  It was recommended that she start Zyrtec for the above symptoms.  She did not think this works very well.  She reports no other new allergens that she is aware of.  I told her I am very strongly suspicious that she is having cat allergies.    Review of Systems: A comprehensive review of systems was negative except as noted.     Medications, Allergies and Problem List   Reviewed and updated     Physical Exam   General Appearance:       Pulse (!) 105  Ht 5' 5\" (1.651 m)  Wt (!) 344 lb (156 kg)  SpO2 95%  BMI 57.24 kg/m2    Nose is slightly runny with clear phlegm.  Lungs are clear.  No wheezes.  No adenopathy in the neck.     Additional Information   Current Outpatient Prescriptions   Medication Sig Dispense Refill     ACCU-CHEK SOFTCLIX LANCETS lancets USE TO TEST QID  11     atorvastatin (LIPITOR) 20 MG tablet Take 1 tablet by mouth daily.  0     BD ULTRA-FINE BRANDEN PEN NEEDLES 32 gauge x 5/32\" Ndle Used as directed       DAILY VITES/IRON Tab tablet Take 1 tablet by mouth daily.  2     ergocalciferol (ERGOCALCIFEROL) 50,000 unit capsule TK 1 C PO 1 TIME A WEEK  3     ketotifen (ZADITOR) 0.025 % (0.035 %) ophthalmic solution 1 drop in each eye every 8 hours. 5 mL 0     levothyroxine (SYNTHROID, " LEVOTHROID) 75 MCG tablet TK ONE T D AT LEAST ONE HOUR BEFORE OR TWO HOURS AFTER A MEAL  11     lisinopril (PRINIVIL,ZESTRIL) 10 MG tablet Take 1 tablet by mouth daily.  3     NOVOLOG 100 unit/mL injection ADM UP  UNI VIA INSULIN PUMP DAILY.  11     NOVOLOG FLEXPEN 100 unit/mL injection pen INJECT 1 UNIT PER 7GM CARBS PLUS CORRECTION UP TO 60 UNITS D; IF INSULIN PUMP FAILS  4     NYSTOP powder KOJO TO SKIN FOLDS RASH D PRN  5     ONETOUCH DELICA LANCETS 33 gauge Misc USE TO TEST QID  7     ONETOUCH ULTRA TEST strips Test 5-6x daily  8     pantoprazole (PROTONIX) 40 MG tablet Take 1 tablet by mouth daily.  3     polymyxin B-trimethoprim (FOR POLYTRIM) 10,000 unit- 1 mg/mL Drop ophthalmic drops 1 drops to the affected eye(s) 4 (four) times a day for 7 days 1 Bottle 0     traZODone (DESYREL) 100 MG tablet TK 1 T PO HS PRF SLP  3     VICTOZA 3-CAROLA 0.6 mg/0.1 mL (18 mg/3 mL) PnIj injection ADM 1.8 MG SC D  11     cetirizine (ZYRTEC) 10 MG tablet Take 1 tablet (10 mg total) by mouth daily. 30 tablet 2     fluticasone (FLONASE) 50 mcg/actuation nasal spray 2 sprays into each nostril daily. 10 g 11     No current facility-administered medications for this visit.      No Known Allergies  Social History   Substance Use Topics     Smoking status: Never Smoker     Smokeless tobacco: Never Used     Alcohol use No       Review and/or order of clinical lab tests:  Review and/or order of radiology tests:  Review and/or order of medicine tests:  Discussion of test results with performing physician:  Decision to obtain old records and/or obtain history from someone other than the patient:  Review and summarization of old records and/or obtaining history from someone other than the patient and.or discussion of case with another health care provider:  Independent visualization of image, tracing or specimen itself:    Time: total time spent with the patient was 15 minutes of which >50% was spent in counseling and coordination of  care     Yoel Reeder MD

## 2021-06-17 NOTE — ANESTHESIA CARE TRANSFER NOTE
Last vitals:   Vitals:    05/04/21 1100   BP: 110/53   Pulse: 93   Resp: 14   Temp:    SpO2: 100%     Patient's level of consciousness is drowsy  Spontaneous respirations: yes  Maintains airway independently: yes  Dentition unchanged: yes  Oropharynx: oropharynx clear of all foreign objects    QCDR Measures:  ASA# 20 - Surgical Safety Checklist: WHO surgical safety checklist completed prior to induction    PQRS# 430 - Adult PONV Prevention: 4558F - Pt received => 2 anti-emetic agents (different classes) preop & intraop  ASA# 8 - Peds PONV Prevention: NA - Not pediatric patient, not GA or 2 or more risk factors NOT present  PQRS# 424 - Lisha-op Temp Management: NA - MAC anesthesia or case < 60 minutes  PQRS# 426 - PACU Transfer Protocol: - Transfer of care checklist used  ASA# 14 - Acute Post-op Pain: ASA14B - Patient did NOT experience pain >= 7 out of 10

## 2021-06-17 NOTE — TELEPHONE ENCOUNTER
Telephone Encounter by Frida Sanchez at 5/4/2020  8:52 AM     Author: Frida Sanchez Service: -- Author Type: Financial Resource Guide    Filed: 5/4/2020  8:55 AM Encounter Date: 5/1/2020 Status: Signed    : Frida Sanchez (Financial Resource Guide)       Medication: Freestyle Waldemar 14 Day [employer]  Qty: 1 reader and 2 sensors for 28 days or pt is able to get 6 sensors for 84 days  Effective Dates: 04/01/2020 - 05/01/2021  Pharmacy: already at [facility]   Copay Amount: reader around $70 and sensors $32.91 for 28 ds and $94.52 for 84 ds  Copay Assistance: NA  Patient Notified? Yes, via [employer]  Pharmacy Notified? Yes

## 2021-06-17 NOTE — TELEPHONE ENCOUNTER
The following [employer] message sent to the patient:    Nilo POLO Dinora,     I see that you have a virtual appt with Kamille Baig NP in Endocrinology on Thursday, May 6th.  We would greatly appreciate it, if you could download your Waldemar reader to the [employer] website. The is best done the day before your appointment but can be done anytime the week prior.  This way Kamille Baig NP can review your blood sugar numbers and have the report accessible for your appointment.  I emailed you the Waldemar invite to your email at [email or web address].  You will need to make an account for yourself if you don't already have one.  Looking forward to talking with you soon.     Please call me at [phone],  if you have any questions or concerns.        Thank you,    Norah COE, Medical Assistant for  Kamille Baig NP  Endocrinology  [phone]

## 2021-06-17 NOTE — ANESTHESIA PREPROCEDURE EVALUATION
Anesthesia Evaluation      Patient summary reviewed   No history of anesthetic complications     Airway   Mallampati: III   Pulmonary - normal exam   (+) sleep apnea,                          Cardiovascular - normal exam  (+) hypertension, ,      Neuro/Psych      Endo/Other    (+) diabetes mellitus well controlled and poorly controlled, hypothyroidism,      GI/Hepatic/Renal    (+) GERD,   chronic renal disease,           Dental - normal exam                        Anesthesia Plan  Planned anesthetic: MAC    ASA 3     Anesthetic plan and risks discussed with: patient  Anesthesia plan special considerations: antiemetics,   Post-op plan: routine recovery

## 2021-06-17 NOTE — TELEPHONE ENCOUNTER
Telephone Encounter by Frida Sanchez at 5/1/2020  3:32 PM     Author: Frida Sanchez Service: -- Author Type: Financial Resource Guide    Filed: 5/1/2020  4:31 PM Encounter Date: 5/1/2020 Status: Signed    : Frida Sanchez (Financial Resource Guide)       Medication: Freestyle Waldemar 14 Day  Qty: 1 device for at least a year if not longer and 2 sensors for 28 days  Effective dates: 04/01/2020 - 05/01/2021  Pharmacy: [facility]   Copay Amount: $70.71 (reader) and $32.91 (2 sensors)   Copay Assistance:   Patient Notified? Yes, pharmacy to inform

## 2021-06-17 NOTE — TELEPHONE ENCOUNTER
Telephone Encounter by Daisy Holder at 10/22/2020  8:50 AM     Author: Daisy Holder Service: -- Author Type: --    Filed: 10/22/2020  8:51 AM Encounter Date: 10/19/2020 Status: Signed    : Daisy Holder       Per insurance PA is not needed, mediation is covered. Informed pharmacy to contact help desk for further issues.                 10:15

## 2021-06-17 NOTE — TELEPHONE ENCOUNTER
Telephone Encounter by Frida Sanchez at 5/1/2020  3:29 PM     Author: Frida Sanchez Service: -- Author Type: Financial Resource Guide    Filed: 5/1/2020  4:31 PM Encounter Date: 5/1/2020 Status: Signed    : Frida Sanchez (Financial Resource Guide)       Medication: Freestyle Waldemar 14 Day    QTY: 1 device  Insurance: [employer]  Additional Information: faxed CMM key from DME team from

## 2021-06-18 NOTE — LETTER
Letter by Masood Melton MD at      Author: Masood Melton MD Service: -- Author Type: --    Filed:  Encounter Date: 2/27/2019 Status: (Other)       Corinna Farias  [address] [place] [address]             February 27, 2019         Dear Ms. Farias,    Below are the results from your recent visit:    Resulted Orders   Lipid Cascade   Result Value Ref Range    Cholesterol 125 <=199 mg/dL    Triglycerides 67 <=149 mg/dL    HDL Cholesterol 51 >=50 mg/dL    LDL Calculated 61 <=129 mg/dL    Patient Fasting > 8hrs? Yes    Comprehensive Metabolic Panel   Result Value Ref Range    Sodium 140 136 - 145 mmol/L    Potassium 3.5 3.5 - 5.0 mmol/L    Chloride 107 98 - 107 mmol/L    CO2 27 22 - 31 mmol/L    Anion Gap, Calculation 6 5 - 18 mmol/L    Glucose 76 70 - 125 mg/dL    BUN 15 8 - 22 mg/dL    Creatinine 0.85 0.60 - 1.10 mg/dL    GFR MDRD Af Amer >60 >60 mL/min/1.73m2    GFR MDRD Non Af Amer >60 >60 mL/min/1.73m2    Bilirubin, Total 0.8 0.0 - 1.0 mg/dL    Calcium 9.5 8.5 - 10.5 mg/dL    Protein, Total 6.7 6.0 - 8.0 g/dL    Albumin 3.2 (L) 3.5 - 5.0 g/dL    Alkaline Phosphatase 102 45 - 120 U/L    AST 17 0 - 40 U/L    ALT 13 0 - 45 U/L    Narrative    Fasting Glucose reference range is 70-99 mg/dL per  American Diabetes Association (ADA) guidelines.   Vitamin D, Total (25-Hydroxy)   Result Value Ref Range    Vitamin D, Total (25-Hydroxy) 58.1 30.0 - 80.0 ng/mL    Narrative    Deficiency <10.0 ng/mL  Insufficiency 10.0-29.9 ng/mL  Sufficiency 30.0-80.0 ng/mL  Toxicity (possible) >100.0 ng/mL       Vitamin D level is actually quite good.  I think we can transition to 2000 international units once daily of vitamin D3.     Please call with questions or contact us using [employer].    Sincerely,        Electronically signed by Masood Melton MD

## 2021-06-18 NOTE — LETTER
Letter by Masood Melton MD at      Author: Masood Melton MD Service: -- Author Type: --    Filed:  Encounter Date: 2/26/2019 Status: (Other)       Corinna Farias  [address] [place] [address]             February 26, 2019         Dear Ms. Farias,    Below are the results from your recent visit:    Resulted Orders   Lipid Cascade   Result Value Ref Range    Cholesterol 125 <=199 mg/dL    Triglycerides 67 <=149 mg/dL    HDL Cholesterol 51 >=50 mg/dL    LDL Calculated 61 <=129 mg/dL    Patient Fasting > 8hrs? Yes    Comprehensive Metabolic Panel   Result Value Ref Range    Sodium 140 136 - 145 mmol/L    Potassium 3.5 3.5 - 5.0 mmol/L    Chloride 107 98 - 107 mmol/L    CO2 27 22 - 31 mmol/L    Anion Gap, Calculation 6 5 - 18 mmol/L    Glucose 76 70 - 125 mg/dL    BUN 15 8 - 22 mg/dL    Creatinine 0.85 0.60 - 1.10 mg/dL    GFR MDRD Af Amer >60 >60 mL/min/1.73m2    GFR MDRD Non Af Amer >60 >60 mL/min/1.73m2    Bilirubin, Total 0.8 0.0 - 1.0 mg/dL    Calcium 9.5 8.5 - 10.5 mg/dL    Protein, Total 6.7 6.0 - 8.0 g/dL    Albumin 3.2 (L) 3.5 - 5.0 g/dL    Alkaline Phosphatase 102 45 - 120 U/L    AST 17 0 - 40 U/L    ALT 13 0 - 45 U/L    Narrative    Fasting Glucose reference range is 70-99 mg/dL per  American Diabetes Association (ADA) guidelines.       Cholesterol looks excellent.  Kidney and liver tests look fine.     Please call with questions or contact us using [employer].    Sincerely,        Electronically signed by Masood Melton MD

## 2021-06-19 NOTE — PROGRESS NOTES
Office Visit - Follow Up   Corinna Farias   62 y.o. female    Date of Visit: 7/24/2018    Chief Complaint   Patient presents with     Diabetes     6 mo f/u - fasting     Edema     bilateral ankles/feets swelling x 2-3 days. No SOB        Assessment and Plan   1. Edema, unspecified type  Likely multifactorial.  We discussed the differential which is broad.  She is on no new medications that could cause it.  She could be developing venous insufficiency which would be very common in this morbidly obese woman.  Will check ultrasound to make sure she does not have blood clots.  We will also check echocardiogram given her history of sleep apnea that is untreated to rule out right heart failure.  Consider trial of diuretics or compression or both.  Await results of testing however.  - Comprehensive Metabolic Panel  - Urinalysis-UC if Indicated  - US Venous Legs Bilateral; Future  - Echo Complete; Future    2. Type 1 diabetes mellitus (H)  Good control.  Labs as below.  - Comprehensive Metabolic Panel  - Lipid Cascade    3. GIOVANI (obstructive sleep apnea)  Untreated.  She refuses to utilize CPAP.    4. Morbid obesity (H)  Continued efforts at lifestyle modification.    5. Acquired hypothyroidism  Thyroid hormone to be done today.  - Thyroid Stimulating Hormone (TSH)    6. Gastroesophageal reflux disease, esophagitis presence not specified  Controlled with long-standing PPI.    7. Vitamin D deficiency  Check vitamin D level today.  - Vitamin D, Total (25-Hydroxy)        No Follow-up on file.     History of Present Illness   This 62 y.o. old patient who struggles with morbid obesity comes in today for follow-up of her multiple medical problems.  She was a new patient to me earlier this year.  Follows with endocrinology for insulin-dependent diabetes.  She is on insulin pump.  She maintains an excellent A1c in the 6-7 range.  Weight is been stable.  She is not very active.  Her biggest concern today is new lower extremity  "edema.  She has had it in the past when she has had or used nonsteroidal anti-inflammatories but has not been taking any of this.  She denies any chest pains or shortness of breath or PND orthopnea.  No history of blood clots.  No frothy urine.  She has been eating well she states.    Review of Systems: A comprehensive review of systems was negative except as noted.     Medications, Allergies and Problem List   Reviewed and updated     Physical Exam   General Appearance:   Pleasant morbidly obese woman in no distress.  1+ edema at the feet bilaterally.    /62 (Patient Site: Right Arm, Patient Position: Sitting, Cuff Size: Adult Large)  Pulse 84  Ht 5' 5\" (1.651 m)  Wt (!) 334 lb (151.5 kg)  SpO2 98%  BMI 55.58 kg/m2         Additional Information   Current Outpatient Prescriptions   Medication Sig Dispense Refill     ACCU-CHEK SOFTCLIX LANCETS lancets USE TO TEST QID  11     atorvastatin (LIPITOR) 20 MG tablet Take 1 tablet by mouth daily.  0     BD ULTRA-FINE BRANDEN PEN NEEDLES 32 gauge x 5/32\" Ndle Used as directed       cetirizine (ZYRTEC) 10 MG tablet Take 1 tablet (10 mg total) by mouth daily. 30 tablet 2     DAILY VITES/IRON Tab tablet Take 1 tablet by mouth daily.  2     ergocalciferol (ERGOCALCIFEROL) 50,000 unit capsule TK 1 C PO 1 TIME A WEEK  3     levothyroxine (SYNTHROID, LEVOTHROID) 75 MCG tablet TK ONE T D AT LEAST ONE HOUR BEFORE OR TWO HOURS AFTER A MEAL  11     lisinopril (PRINIVIL,ZESTRIL) 10 MG tablet Take 1 tablet by mouth daily.  3     NOVOLOG 100 unit/mL injection ADM UP  UNI VIA INSULIN PUMP DAILY.  11     NOVOLOG FLEXPEN 100 unit/mL injection pen INJECT 1 UNIT PER 7GM CARBS PLUS CORRECTION UP TO 60 UNITS D; IF INSULIN PUMP FAILS  4     NYSTOP powder KOJO TO SKIN FOLDS RASH D PRN  5     ONETOUCH DELICA LANCETS 33 gauge Misc USE TO TEST QID  7     ONETOUCH ULTRA TEST strips Test 5-6x daily  8     pantoprazole (PROTONIX) 40 MG tablet Take 1 tablet by mouth daily.  3     traZODone " (DESYREL) 100 MG tablet TK 1 T PO HS PRF SLP  3     VICTOZA 3-CAROLA 0.6 mg/0.1 mL (18 mg/3 mL) PnIj injection ADM 1.8 MG SC D  11     No current facility-administered medications for this visit.      No Known Allergies  Social History   Substance Use Topics     Smoking status: Never Smoker     Smokeless tobacco: Never Used     Alcohol use No       Review and/or order of clinical lab tests:  Review and/or order of radiology tests:  Review and/or order of medicine tests:  Discussion of test results with performing physician:  Decision to obtain old records and/or obtain history from someone other than the patient:  Review and summarization of old records and/or obtaining history from someone other than the patient and.or discussion of case with another health care provider:  Independent visualization of image, tracing or specimen itself:    Time: total time spent with the patient was 25 minutes of which >50% was spent in counseling and coordination of care     Masood Melton MD

## 2021-06-19 NOTE — LETTER
Letter by Masood Melton MD at      Author: Masood Melton MD Service: -- Author Type: --    Filed:  Encounter Date: 8/5/2019 Status: (Other)         August 5, 2019     Patient: Corinna Farias   YOB: 1955            To Whom It May Concern:      Patient would like a cat for companionship and emotional support. Please take this into consideration        Thank you,         Masood Melton MD

## 2021-06-19 NOTE — LETTER
Letter by Masood Melton MD at      Author: Masood Melton MD Service: -- Author Type: --    Filed:  Encounter Date: 10/23/2019 Status: Signed         Corinna Farias  [address]  [place] [address]             October 23, 2019         Dear Ms. Farias,    Below are the results from your recent visit:    Resulted Orders   Thyroid Cascade   Result Value Ref Range    TSH 0.30 0.30 - 5.00 uIU/mL   Comprehensive Metabolic Panel   Result Value Ref Range    Sodium 141 136 - 145 mmol/L    Potassium 4.2 3.5 - 5.0 mmol/L    Chloride 105 98 - 107 mmol/L    CO2 27 22 - 31 mmol/L    Anion Gap, Calculation 9 5 - 18 mmol/L    Glucose 68 (L) 70 - 125 mg/dL    BUN 22 8 - 22 mg/dL    Creatinine 0.97 0.60 - 1.10 mg/dL    GFR MDRD Af Amer >60 >60 mL/min/1.73m2    GFR MDRD Non Af Amer 58 (L) >60 mL/min/1.73m2    Bilirubin, Total 1.0 0.0 - 1.0 mg/dL    Calcium 10.2 8.5 - 10.5 mg/dL    Protein, Total 7.2 6.0 - 8.0 g/dL    Albumin 3.6 3.5 - 5.0 g/dL    Alkaline Phosphatase 123 (H) 45 - 120 U/L    AST 16 0 - 40 U/L    ALT 18 0 - 45 U/L    Narrative    Fasting Glucose reference range is 70-99 mg/dL per  American Diabetes Association (ADA) guidelines.   Lipid Cascade   Result Value Ref Range    Cholesterol 145 <=199 mg/dL    Triglycerides 93 <=149 mg/dL    HDL Cholesterol 61 >=50 mg/dL    LDL Calculated 65 <=129 mg/dL    Patient Fasting > 8hrs? Yes        Your sugar was a little low when you were in getting your labs Corinna.  Everything else here looks good.     Please call with questions or contact us using [employer].    Sincerely,        Electronically signed by Masood Melton MD

## 2021-06-20 NOTE — LETTER
Letter by Masood Melton MD at      Author: Masood Melton MD Service: -- Author Type: --    Filed:  Encounter Date: 2/7/2020 Status: (Other)         Corinna Farias  [address]  [place] [address]             February 7, 2020         Dear Ms. Farias,    Below are the results from your recent visit:    Resulted Orders   Thyroid Cascade   Result Value Ref Range    TSH 0.26 (L) 0.30 - 5.00 uIU/mL   Lipid Cascade   Result Value Ref Range    Cholesterol 173 <=199 mg/dL    Triglycerides 124 <=149 mg/dL    HDL Cholesterol 49 (L) >=50 mg/dL    LDL Calculated 99 <=129 mg/dL    Patient Fasting > 8hrs? Yes    Comprehensive Metabolic Panel   Result Value Ref Range    Sodium 137 136 - 145 mmol/L    Potassium 4.5 3.5 - 5.0 mmol/L    Chloride 102 98 - 107 mmol/L    CO2 26 22 - 31 mmol/L    Anion Gap, Calculation 9 5 - 18 mmol/L    Glucose 147 (H) 70 - 125 mg/dL    BUN 21 8 - 22 mg/dL    Creatinine 0.94 0.60 - 1.10 mg/dL    GFR MDRD Af Amer >60 >60 mL/min/1.73m2    GFR MDRD Non Af Amer 60 (L) >60 mL/min/1.73m2    Bilirubin, Total 0.6 0.0 - 1.0 mg/dL    Calcium 10.1 8.5 - 10.5 mg/dL    Protein, Total 7.6 6.0 - 8.0 g/dL    Albumin 3.6 3.5 - 5.0 g/dL    Alkaline Phosphatase 102 45 - 120 U/L    AST 19 0 - 40 U/L    ALT 18 0 - 45 U/L    Narrative    Fasting Glucose reference range is 70-99 mg/dL per  American Diabetes Association (ADA) guidelines.   T4, Free   Result Value Ref Range    Free T4 1.2 0.7 - 1.8 ng/dL       Labs look pretty good here.  Cholesterol is a little more unfavorable than it has in the past, possibly due to your weight gain.  Please be more vigilant about a Mediterranean diet and exercise.  Borderline too much thyroid but I am not going to change anything today.  We will recheck again next visit.     Please call with questions or contact us using [employer].    Sincerely,        Electronically signed by Masood Melton MD

## 2021-06-20 NOTE — LETTER
Letter by Masood Melton MD at      Author: Masood Melton MD Service: -- Author Type: --    Filed:  Encounter Date: 2/19/2020 Status: (Other)        [facility] INTERNAL MEDICINE  [address]  [place] [address]  [phone]         Corinna Farias  [address]  [place] [address]        02/19/20    Dear Corinna Farias,     At [facility] we care about your health and well-being. Your primary care provider is committed to ensuring you receive high quality care and has chosen a network of specialists to assist in providing that care. Recently Dr. Melton referred you to Dermatology for specialty care.      Please call [facility] [phone] at your earliest convenience for assistance in scheduling an appointment.  If you have already scheduled this appointment, please disregard this notice.  Thank you for choosing [facility] for your healthcare needs.       Sincerely,       [facility] Specialty Scheduling

## 2021-06-20 NOTE — LETTER
Letter by Masood Melton MD at      Author: Masood Melton MD Service: -- Author Type: --    Filed:  Encounter Date: 2/24/2020 Status: (Other)        [facility] INTERNAL MEDICINE  [address]  [facility] [address]  [phone]         Corinna Farias  [address]  [place] [address]        02/24/20    Dear Corinna Farias,     At [facility] we care about your health and well-being. Your primary care provider is committed to ensuring you receive high quality care and has chosen a network of specialists to assist in providing that care. Recently Dr. Melton referred you to Bariatric for specialty care.      Please call [phone] at your earliest convenience for assistance in scheduling an appointment.  If you have already scheduled this appointment, please disregard this notice.  Thank you for choosing [facility] for your healthcare needs.       Sincerely,       [facility] Specialty Scheduling

## 2021-06-21 NOTE — LETTER
Letter by Masood Melton MD at      Author: Masood Melton MD Service: -- Author Type: --    Filed:  Encounter Date: 10/20/2020 Status: (Other)         Corinna Farias  [address]  [place] [address]             October 20, 2020         Dear Ms. Farias,    Below are the results from your recent visit:    Resulted Orders   Lipid Cascade   Result Value Ref Range    Cholesterol 124 <=199 mg/dL    Triglycerides 97 <=149 mg/dL    HDL Cholesterol 48 (L) >=50 mg/dL    LDL Calculated 57 <=129 mg/dL   Thyroid Stimulating Hormone (TSH)   Result Value Ref Range    TSH 0.28 (L) 0.30 - 5.00 uIU/mL       We will discuss the thyroid test at your visit.     Please call with questions or contact us using [employer].    Sincerely,        Electronically signed by Masood Melton MD

## 2021-06-21 NOTE — PROGRESS NOTES
Patient was identified as a potential candidate for the Clinic Care Coordination program and has declined participating. I will discontinue outreach to the patient at this time. If the provider feels this patient is a good fit in the future I have asked them to resend to the [facility] referral bucket. I have also provided the patient with my contact information should they change their mind.     Patient is connect to [facility] already and is on a waiting list for housing. She did not feel that [facility] could help her at this time.

## 2021-06-21 NOTE — LETTER
Letter by Masood Melton MD at      Author: Masood Melton MD Service: -- Author Type: --    Filed:  Encounter Date: 10/20/2020 Status: (Other)         Corinna Farias  [address]  [place] [address]             October 20, 2020         Dear Ms. Farias,    Below are the results from your recent visit:    Resulted Orders   Lipid Cascade   Result Value Ref Range    Cholesterol 124 <=199 mg/dL    Triglycerides 97 <=149 mg/dL    HDL Cholesterol 48 (L) >=50 mg/dL    LDL Calculated 57 <=129 mg/dL       Cholesterol numbers look good.     Please call with questions or contact us using [employer].    Sincerely,        Electronically signed by Masood Melton MD

## 2021-06-21 NOTE — PROGRESS NOTES
Attempt 1-Care Guide called patient.  If this patient is returning our call please transfer to [employer] at ext. [phone]    Next outreach attempt due: 11/21/18

## 2021-06-21 NOTE — PROGRESS NOTES
"  Office Visit - Follow Up   Corinna Farias   63 y.o. female    Date of Visit: 11/14/2018         Assessment and Plan   1. Rash  Typical erythasma / intertriginous yeast infection of the breasts . Trial of topical ketoconazole for 4 weeks as it has anti-inflammatory effects. If this fails will treat with systemic diflucan . Explained prevention of moisture , wearing cotton clothes , bras etc  Unlikely to be psoriasis or excema or drug reaction   2. URTI (acute upper respiratory infection)  Is getting better . nonfocal exam  No evidenceof LRTI . Continue symptomatic treatment     3. Type 1 diabetes mellitus without complication (H)  Lab Results   Component Value Date    HGBA1C 6.2 (H) 06/25/2018     apears well controlled . Is on an insulin pump she would like referral back to endo   - Ambulatory referral to Endocrinology  - Ambulatory referral to Care Management (Primary Care)      Is currently homeless and living in shelter . This probably explains why she may have the cold and the rash as she is not able to shower , or launder her clothes as much . Will refer to care management to help her coordinate her care     No Follow-up on file.     History of Present Illness   This 63 y.o. old   Chief Complaint   Patient presents with     Follow-up     pt c/o rash in between her breasts, she states that she has had it for about 2 weeks-pt was taking nystop powder and it seemed to make the rash worse-pt c/o cough for the past month, started as a cold and travelled to her chest, stuffy nose is now gone, but the cough still lingers     Review of Systems: A comprehensive review of systems was negative except as noted.     Medications, Allergies and Problem List   Reviewed, reconciled and updated     Physical Exam   General Appearance:   Pleasant , looks well     /82   Pulse 79   Ht 5' 5\" (1.651 m)   Wt (!) 323 lb (146.5 kg)   SpO2 95% Comment: RA  BMI 53.75 kg/m      General appearance - alert, well appearing, and " "in no distress  Mental status - alert, oriented to person, place, and time  Neck - supple, no significant adenopathy  Lymphatics - no palpable lymphadenopathy, no hepatosplenomegaly  Chest - clear to auscultation, no wheezes, rales or rhonchi, symmetric air entry  Heart - normal rate, regular rhythm, normal S1, S2, no murmurs, rubs, clicks or gallops  Has diffuse erythematous rash with scales and no skin break down in between breasts . Some satellite lesions over right breast, less present under breasts . Has a few discrete scaly erythematous patches                                                                                        Additional Information   Current Outpatient Medications   Medication Sig Dispense Refill     ACCU-CHEK SOFTCLIX LANCETS lancets USE TO TEST QID  11     atorvastatin (LIPITOR) 20 MG tablet Take 1 tablet by mouth daily.  0     BD ULTRA-FINE BRANDEN PEN NEEDLES 32 gauge x 5/32\" Ndle Used as directed       cetirizine (ZYRTEC) 10 MG tablet Take 1 tablet (10 mg total) by mouth daily. 30 tablet 2     DAILY VITES/IRON Tab tablet Take 1 tablet by mouth daily.  2     ergocalciferol (ERGOCALCIFEROL) 50,000 unit capsule TK 1 C PO 1 TIME A WEEK  3     levothyroxine (SYNTHROID, LEVOTHROID) 75 MCG tablet TK ONE T D AT LEAST ONE HOUR BEFORE OR TWO HOURS AFTER A MEAL  11     lisinopril (PRINIVIL,ZESTRIL) 10 MG tablet Take 1 tablet by mouth daily.  3     NOVOLOG 100 unit/mL injection ADM UP  UNI VIA INSULIN PUMP DAILY.  11     NOVOLOG FLEXPEN 100 unit/mL injection pen INJECT 1 UNIT PER 7GM CARBS PLUS CORRECTION UP TO 60 UNITS D; IF INSULIN PUMP FAILS  4     NYSTOP powder KOJO TO SKIN FOLDS RASH D PRN  5     ONETOUCH DELICA LANCETS 33 gauge Misc USE TO TEST QID  7     ONETOUCH ULTRA TEST strips Test 5-6x daily  8     pantoprazole (PROTONIX) 40 MG tablet Take 1 tablet by mouth daily.  3     traZODone (DESYREL) 100 MG tablet TK 1 T PO HS PRF SLP  3     VICTOZA 3-CAROLA 0.6 mg/0.1 mL (18 mg/3 mL) PnIj " injection ADM 1.8 MG SC D  11     No current facility-administered medications for this visit.      No Known Allergies  Social History     Tobacco Use     Smoking status: Never Smoker     Smokeless tobacco: Never Used   Substance Use Topics     Alcohol use: No     Drug use: No       Time: total time spent with the patient was 15 minutes of which >50% was spent in counseling and coordination of care     Christie Kunz MD

## 2021-06-22 NOTE — TELEPHONE ENCOUNTER
RN cannot approve Refill Request    RN can NOT refill this medication med is not covered by policy/route to provider. Last office visit: 11/14/2018 Christie Kunz MD Last Physical: Visit date not found Last MTM visit: Visit date not found Last visit same specialty: 11/14/2018 Christie Kunz MD.  Next visit within 3 mo: Visit date not found  Next physical within 3 mo: Visit date not found      Jeimy Horan, [facility] Triage/Med Refill 1/5/2019    Requested Prescriptions   Pending Prescriptions Disp Refills     fluconazole (DIFLUCAN) 150 MG tablet 7 tablet 0     Sig: Take 1 tablet (150 mg total) by mouth daily.    There is no refill protocol information for this order

## 2021-06-23 NOTE — PROGRESS NOTES
Office Visit - Follow Up   Corinna Farias   63 y.o. female    Date of Visit: 1/24/2019    Chief Complaint   Patient presents with     Diabetes     6 mo f/u - not fasting, need labs done prior to seeing endo on 2/6/19     URI     cold since august Sxs: post nasal drainge, sinus pressure - has tried some otc cold med without relief         Assessment and Plan   1. Post-nasal drainage  Possibly allergic in nature versus recurrent colds that she is picking up in the homeless shelter.  Neph no indication for an antibiotic at this time.  Trial of fluticasone nasal spray.    2. GIOVANI (obstructive sleep apnea)  She refuses to utilize CPAP.    3. Morbid obesity (H)  She has been losing some weight probably due to change in dietary habits.  He is not been snacking at [place].  Continue same.    4. Acquired hypothyroidism  Continue current levothyroxine dose.    5. Type 2 diabetes mellitus with complication, with long-term current use of insulin (H)  She needs labs for endocrinology.  - Hemoglobin A1c - future  - Basic Metabolic Panel    6. Essential hypertension, benign  Blood pressure is sub-adequately controlled.  Increase lisinopril to 20 mg daily and see me back in 1 month.    I am hopeful that Corinna and her  can get into more permanent housing.        Return in about 4 weeks (around 2/21/2019) for Recheck.     History of Present Illness   This 63 y.o. old Corinna comes in today for follow-up of her multiple medical problems.  Morbidly obese woman with long-standing diabetes that is now insulin-dependent.  She manages with a pump as well as Victoza.  She is lost some weight and attributes that may be to the Victoza but she is also now homeless and living in a homeless shelter.  She has different eating habits as a result.  She does not snack is not able to do such.  She notes the chronic nasal drainage and postnasal drip since moving into the homeless shelter.  They are trying to get her into some more permanent  "housing.  Her  still working at [facility] [employer].  Sugars have been going a little higher.  She is not checking her blood pressure.  She needs an eye exam and a mammogram.    Review of Systems: A comprehensive review of systems was negative except as noted.     Medications, Allergies and Problem List   Reviewed, reconciled and updated     Physical Exam   General Appearance:   Pleasant morbidly obese woman.  Blood pressure is high.  No purulent nasal discharge and no purulent postnasal drip seen.  Neck is supple.  Lungs are clear.    /86   Pulse 70   Temp 97.8  F (36.6  C)   Ht 5' 5\" (1.651 m)   Wt (!) 306 lb (138.8 kg)   SpO2 98%   BMI 50.92 kg/m           Additional Information   Current Outpatient Medications   Medication Sig Dispense Refill     ACCU-CHEK SOFTCLIX LANCETS lancets USE TO TEST QID  11     atorvastatin (LIPITOR) 20 MG tablet Take 1 tablet by mouth daily.  0     BD ULTRA-FINE BRANDEN PEN NEEDLES 32 gauge x 5/32\" Ndle Used as directed       cetirizine (ZYRTEC) 10 MG tablet Take 1 tablet (10 mg total) by mouth daily. 30 tablet 2     DAILY VITES/IRON Tab tablet Take 1 tablet by mouth daily. 90 tablet 3     ergocalciferol (ERGOCALCIFEROL) 50,000 unit capsule TK 1 C PO 1 TIME A WEEK  3     levothyroxine (SYNTHROID, LEVOTHROID) 75 MCG tablet TK ONE T D AT LEAST ONE HOUR BEFORE OR TWO HOURS AFTER A MEAL  11     lisinopril (PRINIVIL,ZESTRIL) 20 MG tablet Take 1 tablet (20 mg total) by mouth daily. 90 tablet 1     NOVOLOG 100 unit/mL injection ADM UP  UNI VIA INSULIN PUMP DAILY.  11     NOVOLOG FLEXPEN 100 unit/mL injection pen INJECT 1 UNIT PER 7GM CARBS PLUS CORRECTION UP TO 60 UNITS D; IF INSULIN PUMP FAILS  4     NYSTOP powder KOJO TO SKIN FOLDS RASH D PRN  5     ONETOUCH DELICA LANCETS 33 gauge Misc USE TO TEST QID  7     ONETOUCH ULTRA TEST strips Test 5-6x daily  8     pantoprazole (PROTONIX) 40 MG tablet Take 1 tablet by mouth daily.  3     traZODone (DESYREL) 100 MG tablet TK 1 T PO HS PRF " SLP  3     VICTOZA 3-CAROLA 0.6 mg/0.1 mL (18 mg/3 mL) PnIj injection ADM 1.8 MG SC D  11     fluticasone (FLONASE) 50 mcg/actuation nasal spray 2 sprays into each nostril daily. 10 g 1     No current facility-administered medications for this visit.      No Known Allergies  Social History     Tobacco Use     Smoking status: Never Smoker     Smokeless tobacco: Never Used   Substance Use Topics     Alcohol use: No     Drug use: No       Review and/or order of clinical lab tests:  Review and/or order of radiology tests:  Review and/or order of medicine tests:  Discussion of test results with performing physician:  Decision to obtain old records and/or obtain history from someone other than the patient:  Review and summarization of old records and/or obtaining history from someone other than the patient and.or discussion of case with another health care provider:  Independent visualization of image, tracing or specimen itself:    Time: total time spent with the patient was 25 minutes of which >50% was spent in counseling and coordination of care     Masood Melton MD

## 2021-06-24 NOTE — TELEPHONE ENCOUNTER
LMTCB to inform Kamille is not willing to complete DMV form until patient is seen as she hasn't been in for an appt in over a year (and was supposed to be seen for 3 month follow-up).     Patient has, however, seen her PCP a couple of times in the last year.  She can have him complete in the meantime, or it will have to wait until her scheduled appt time.

## 2021-06-24 NOTE — TELEPHONE ENCOUNTER
Left message to call back for: Doris  Information to relay to patient:  LMOM- please send better copy if able- or let me know if it can be downloaded from somewhere. Unable to fill out as it is now.

## 2021-06-24 NOTE — TELEPHONE ENCOUNTER
Please fax form to Dr melton's office and he will review it. It is: [phone]. ATTN Dr Melton (in large letters) with any notes, like review, etc.

## 2021-06-24 NOTE — TELEPHONE ENCOUNTER
Because I do not follow her for her insulin and she uses a pump, I am not willing to fill out 's forms.  She will need to follow-up with her endocrinology provider.

## 2021-06-24 NOTE — TELEPHONE ENCOUNTER
Left message to call back for: Doris  Information to relay to patient:  See message from PCP- please clarify/get additional information

## 2021-06-24 NOTE — TELEPHONE ENCOUNTER
Pt has dropped off a form for the [place] [employer]. It is in the workroom in the plastic tray.

## 2021-06-24 NOTE — TELEPHONE ENCOUNTER
Spoke with patient.  She is going to ask her PCP to complete for her.  If they are willing, she will call us back with a number to fax form to.  Form is in my basket on my desk at [facility].

## 2021-06-24 NOTE — TELEPHONE ENCOUNTER
Pt notified lab results and verbalized understanding. Med list updated. Patient requested that new rx for vitamin d3 2000 iu be sent to pharmacy. Rx set up for auth.

## 2021-06-24 NOTE — TELEPHONE ENCOUNTER
I would need to see it to determine if it is something I can fill out.  Please see my earlier response.

## 2021-06-24 NOTE — TELEPHONE ENCOUNTER
Did the patient request you fill out the form since Kamille is not comfortable doing so as she has not seen us in over a year (and was to follow-up 3 months afterward)?     We understood that she had requested you to complete form since we informed her to speak to you and we would send the form if that was an option.    If unable to complete, please inform patient as to why and that she will need to wait to see Kamille since it's been so long.

## 2021-06-24 NOTE — TELEPHONE ENCOUNTER
Patient notified of Dr. Melton's response. Her paperwork is due on 5/1/19- she would like to know if she can be work in sooner for endo so forms may be completed on time?

## 2021-06-24 NOTE — TELEPHONE ENCOUNTER
----- Message from Masood Melton MD sent at 2/26/2019  5:30 PM CST -----  Please call pt and send letter:    Vitamin D level is actually quite good.  I think we can transition to 2000 international units once daily of vitamin D3.    Please discontinue the high-dose vitamin D and update the med list.  Thank you

## 2021-06-24 NOTE — TELEPHONE ENCOUNTER
Who is calling:  Doris   Reason for Call:  Caller stated that they are looking for housing options for patient. Caller stated that most HUD qualifications state that patient must be over 62 years old. Caller stated that they are looking to have Masood Melton MD fill out form to allow the housing to allow patient to move in prior to turning 64. Caller stated that she would like to know if she can fax over the form to have Masood Melton MD look it over and see if it is something that Masood Melton MD would fill out  For the patient.  Okay to leave a detailed message: Yes [phone]

## 2021-06-24 NOTE — PROGRESS NOTES
"  Office Visit - Follow Up   Corinna Farias   63 y.o. female    Date of Visit: 2/25/2019    Chief Complaint   Patient presents with     Hypertension     increased dose of lisinopril to 20mg daily        Assessment and Plan   1. Essential hypertension, benign  Under control.  Continue current regimen.  Renal function will be checked today.    2. Type 2 diabetes mellitus with complication, with long-term current use of insulin (H)  Lipids are due.  Her most recent A1c look good.  - Lipid Cascade  - Comprehensive Metabolic Panel    3. Vitamin D deficiency  She has been on high-dose vitamin D for some time.  Unclear if she still needs this.  Recheck vitamin D level today.  - Vitamin D, Total (25-Hydroxy)    4.  Morbid obesity    She has gait instability as a result.  She utilizes walker to get around.  Her walker is in disrepair.  She was given new order for 4 wheeled walker.        Return in about 3 months (around 5/25/2019) for Recheck.     History of Present Illness   This 63 y.o. old Corinna comes in today for follow-up of her multiple medical problems.  She has morbid obesity with multiple comorbidities.  She has diabetes.  Blood pressure was high at our last visit and I placed her on a higher dose of lisinopril.  She seems to be tolerating this without difficulty.  She denies chest pains or shortness of breath.  She needs a new walker.  Hers is falling apart.  She is tolerating the higher dose of lisinopril without any lightheadedness or dizziness.    Review of Systems: A comprehensive review of systems was negative except as noted.     Medications, Allergies and Problem List   Reviewed, reconciled and updated     Physical Exam   General Appearance:   Pleasant woman.  No distress.  Vital signs noted.  Blood pressure much better today.    /70   Ht 5' 5\" (1.651 m)   Wt (!) 307 lb (139.3 kg)   BMI 51.09 kg/m           Additional Information   Current Outpatient Medications   Medication Sig Dispense Refill " "    ACCU-CHEK SOFTCLIX LANCETS lancets USE TO TEST QID  11     atorvastatin (LIPITOR) 20 MG tablet Take 1 tablet by mouth daily.  0     BD ULTRA-FINE BRANDEN PEN NEEDLES 32 gauge x 5/32\" Ndle Used as directed       cetirizine (ZYRTEC) 10 MG tablet Take 1 tablet (10 mg total) by mouth daily. 30 tablet 2     DAILY VITES/IRON Tab tablet Take 1 tablet by mouth daily. 90 tablet 3     ergocalciferol (ERGOCALCIFEROL) 50,000 unit capsule Take 1 capsule (50,000 Units total) by mouth once a week. 4 capsule 11     fluticasone (FLONASE) 50 mcg/actuation nasal spray 2 sprays into each nostril daily. 10 g 1     levothyroxine (SYNTHROID, LEVOTHROID) 75 MCG tablet TK ONE T D AT LEAST ONE HOUR BEFORE OR TWO HOURS AFTER A MEAL  11     lisinopril (PRINIVIL,ZESTRIL) 20 MG tablet Take 1 tablet (20 mg total) by mouth daily. 90 tablet 1     NOVOLOG 100 unit/mL injection ADM UP  UNI VIA INSULIN PUMP DAILY.  11     NOVOLOG FLEXPEN 100 unit/mL injection pen INJECT 1 UNIT PER 7GM CARBS PLUS CORRECTION UP TO 60 UNITS D; IF INSULIN PUMP FAILS  4     NYSTOP powder KOJO TO SKIN FOLDS RASH D PRN  5     ONETOUCH DELICA LANCETS 33 gauge Misc USE TO TEST QID  7     ONETOUCH ULTRA TEST strips Test 5-6x daily  8     pantoprazole (PROTONIX) 40 MG tablet Take 1 tablet by mouth daily.  3     traZODone (DESYREL) 100 MG tablet TK 1 T PO HS PRF SLP  3     VICTOZA 3-CAROLA 0.6 mg/0.1 mL (18 mg/3 mL) PnIj injection ADM 1.8 MG SC D  11     No current facility-administered medications for this visit.      No Known Allergies  Social History     Tobacco Use     Smoking status: Never Smoker     Smokeless tobacco: Never Used   Substance Use Topics     Alcohol use: No     Drug use: No       Review and/or order of clinical lab tests:  Review and/or order of radiology tests:  Review and/or order of medicine tests:  Discussion of test results with performing physician:  Decision to obtain old records and/or obtain history from someone other than the patient:  Review and " summarization of old records and/or obtaining history from someone other than the patient and.or discussion of case with another health care provider:  Independent visualization of image, tracing or specimen itself:    Time: not applicable     Masood Melton MD

## 2021-06-24 NOTE — TELEPHONE ENCOUNTER
I do not know what this means.  She has diabetes but I am not sure if that qualifies her for housing.  I do not do disability determination.  Need more information of what is being asked for.

## 2021-06-25 NOTE — TELEPHONE ENCOUNTER
Date: 6/29/2021 Status: [facility]   Time: 8:00 AM Length: 20   Visit Type: VIDEO VISIT RETURN [1702] Copay: $0.00   Provider: Rossy Baig NP       Please bridge medications.

## 2021-06-25 NOTE — TELEPHONE ENCOUNTER
Kamille TeaM    Please refill:  - NOVOLOG 100 unit/mL injection (vials for pump)    Make sure instructions still have: ADM UP  UNI VIA INSULIN PUMP DAILY.    Please send to: [id number] - [place], [place] - [address] AT [address]

## 2021-06-25 NOTE — TELEPHONE ENCOUNTER
Left detailed message for the patient relaying message below from Dr. Melton.  Asked the patient to call if she has any further questions.  Kamille DAWKINS, LAKSHMI/CMT....................8:48 AM

## 2021-06-25 NOTE — TELEPHONE ENCOUNTER
Please get patient into see her endocrinologist provider ASAP.  I do not have the expertise or knowledge to manage an insulin pump.  I have not been managing her pump.

## 2021-06-25 NOTE — TELEPHONE ENCOUNTER
Who is calling:  Patient   Reason for Call:  lmtcb , Patient has no follow up questions regarding this matter and has made lab and follow up apt w/ Endocrinologist.   Date of last appointment with primary care: 02/25/19  Okay to leave a detailed message: No

## 2021-06-25 NOTE — TELEPHONE ENCOUNTER
Appointment w/Kamille scheduled for 06.29.2021.  Please authorize refill asap. She also needs enough to last her until her visit.

## 2021-07-01 ENCOUNTER — AMBULATORY - HEALTHEAST (OUTPATIENT)
Dept: ENDOCRINOLOGY | Facility: CLINIC | Age: 66
End: 2021-07-01

## 2021-07-01 ENCOUNTER — AMBULATORY - HEALTHEAST (OUTPATIENT)
Dept: LAB | Facility: CLINIC | Age: 66
End: 2021-07-01

## 2021-07-01 DIAGNOSIS — E11.8 TYPE 2 DIABETES MELLITUS WITH COMPLICATION, WITH LONG-TERM CURRENT USE OF INSULIN (H): ICD-10-CM

## 2021-07-01 DIAGNOSIS — Z79.4 TYPE 2 DIABETES MELLITUS WITH COMPLICATION, WITH LONG-TERM CURRENT USE OF INSULIN (H): ICD-10-CM

## 2021-07-04 NOTE — ADDENDUM NOTE
Addendum Note by Breana Barker RN at 5/26/2021  8:21 AM     Author: Breana Barker RN Service: -- Author Type: Registered Nurse    Filed: 5/26/2021  8:21 AM Encounter Date: 5/25/2021 Status: Signed    : Breana Barker RN (Registered Nurse)    Addended by: BREANA BARKER on: 5/26/2021 08:21 AM        Modules accepted: Orders

## 2021-07-06 ENCOUNTER — COMMUNICATION - HEALTHEAST (OUTPATIENT)
Dept: ENDOCRINOLOGY | Facility: CLINIC | Age: 66
End: 2021-07-06

## 2021-07-06 NOTE — TELEPHONE ENCOUNTER
Telephone Encounter by Catarina Nowak at 7/6/2021 11:58 AM     Author: Catarina Nowak Service: -- Author Type: --    Filed: 7/6/2021 11:58 AM Encounter Date: 6/29/2021 Status: Signed    : Catarina Nowak       Date: 12/21/2021 Status: Scheduled   Time: 10:30 AM Length: 30   Visit Type: VIDEO VISIT RETURN [6612] Copay: $0.00   Provider: Rossy Baig NP

## 2021-07-06 NOTE — TELEPHONE ENCOUNTER
Telephone Encounter by Catarina Nowak at 7/6/2021 11:58 AM     Author: Catarina Nowak Service: -- Author Type: --    Filed: 7/6/2021 11:58 AM Encounter Date: 6/29/2021 Status: Signed    : Catarina Nowak       Date: 7/1/2021 Status: Comp   Time: 3:00 PM Length: 15   Visit Type: LAB [[id number]] Copay: $0.00   Provider: [facility]

## 2021-07-07 NOTE — TELEPHONE ENCOUNTER
Lvmtcb.     Do lab now, when pt able to come in.     F/u with Kamille in 6 months with lab prior.     Please evan appt,. Thanks.

## 2021-07-12 DIAGNOSIS — E10.9 TYPE 1 DIABETES MELLITUS WITHOUT COMPLICATION (H): Primary | ICD-10-CM

## 2021-07-12 RX ORDER — INSULIN ASPART 100 [IU]/ML
INJECTION, SOLUTION INTRAVENOUS; SUBCUTANEOUS
Qty: 18 ML | Refills: 3 | Status: SHIPPED | OUTPATIENT
Start: 2021-07-12 | End: 2021-11-01

## 2021-07-17 DIAGNOSIS — E10.9 TYPE 1 DIABETES MELLITUS WITHOUT COMPLICATION (H): Primary | ICD-10-CM

## 2021-07-20 RX ORDER — INSULIN GLARGINE 100 [IU]/ML
INJECTION, SOLUTION SUBCUTANEOUS
Qty: 75 ML | Refills: 1 | Status: SHIPPED | OUTPATIENT
Start: 2021-07-20 | End: 2021-10-28

## 2021-08-26 DIAGNOSIS — E55.9 VITAMIN D DEFICIENCY: Primary | ICD-10-CM

## 2021-08-26 RX ORDER — MULTIVITAMIN
1 TABLET ORAL DAILY
Qty: 90 TABLET | Refills: 3 | Status: SHIPPED | OUTPATIENT
Start: 2021-08-26 | End: 2022-08-11

## 2021-08-26 NOTE — TELEPHONE ENCOUNTER
Reason for Call:  Medication or medication refill:    Do you use a [facility]?  Name of the pharmacy and phone number for the current request:    [facility]    Name of the medication requested: Daily VIT or tabaVit-daily vitamin      pt would like either one sent to her pharm    Other request: states pharm     Can we leave a detailed message on this number? YES    Phone number patient can be reached at: Home number on file [phone] (home)    Best Time: any    Call taken on 8/26/2021 at 12:31 PM by Pam J. Behr

## 2021-10-15 DIAGNOSIS — K21.9 GASTROESOPHAGEAL REFLUX DISEASE, UNSPECIFIED WHETHER ESOPHAGITIS PRESENT: Primary | ICD-10-CM

## 2021-10-17 NOTE — TELEPHONE ENCOUNTER
"Routing refill request to provider for review/approval because:  Drug not active on patient's medication list    Last Written Prescription Date:  ?  Last Fill Quantity: ?,  # refills: ?   Last office visit provider:  4/20/21     Requested Prescriptions   Pending Prescriptions Disp Refills     pantoprazole (PROTONIX) 40 MG EC tablet [Pharmacy Med Name: PANTOPRAZOLE 40MG TABLETS] 90 tablet      Sig: TAKE 1 TABLET(40 MG) BY MOUTH DAILY       PPI Protocol Failed - 10/15/2021 10:38 PM        Failed - Medication is active on med list        Passed - Not on Clopidogrel (unless Pantoprazole ordered)        Passed - No diagnosis of osteoporosis on record        Passed - Recent (12 mo) or future (30 days) visit within the authorizing provider's specialty     Patient has had an office visit with the authorizing provider or a provider within the authorizing providers department within the previous 12 mos or has a future within next 30 days. See \"Patient Info\" tab in inbasket, or \"Choose Columns\" in Meds & Orders section of the refill encounter.              Passed - Patient is age 18 or older        Passed - No active pregnacy on record        Passed - No positive pregnancy test in past 12 months             enrike gaming RN 10/17/21 5:05 AM  "

## 2021-10-18 RX ORDER — PANTOPRAZOLE SODIUM 40 MG/1
TABLET, DELAYED RELEASE ORAL
Qty: 90 TABLET | Refills: 1 | Status: SHIPPED | OUTPATIENT
Start: 2021-10-18 | End: 2022-04-08

## 2021-10-28 ENCOUNTER — ANCILLARY PROCEDURE (OUTPATIENT)
Dept: GENERAL RADIOLOGY | Facility: CLINIC | Age: 66
End: 2021-10-28
Attending: INTERNAL MEDICINE
Payer: COMMERCIAL

## 2021-10-28 ENCOUNTER — OFFICE VISIT (OUTPATIENT)
Dept: INTERNAL MEDICINE | Facility: CLINIC | Age: 66
End: 2021-10-28
Payer: COMMERCIAL

## 2021-10-28 VITALS
TEMPERATURE: 98.6 F | BODY MASS INDEX: 48.82 KG/M2 | SYSTOLIC BLOOD PRESSURE: 126 MMHG | WEIGHT: 293 LBS | HEIGHT: 65 IN | HEART RATE: 100 BPM | OXYGEN SATURATION: 96 % | DIASTOLIC BLOOD PRESSURE: 84 MMHG

## 2021-10-28 DIAGNOSIS — E66.01 MORBID OBESITY (H): ICD-10-CM

## 2021-10-28 DIAGNOSIS — G47.33 OSA (OBSTRUCTIVE SLEEP APNEA): ICD-10-CM

## 2021-10-28 DIAGNOSIS — N18.31 STAGE 3A CHRONIC KIDNEY DISEASE (H): ICD-10-CM

## 2021-10-28 DIAGNOSIS — Z23 HIGH PRIORITY FOR 2019-NCOV VACCINE: ICD-10-CM

## 2021-10-28 DIAGNOSIS — Z79.4 TYPE 2 DIABETES MELLITUS WITH MICROALBUMINURIA, WITH LONG-TERM CURRENT USE OF INSULIN (H): ICD-10-CM

## 2021-10-28 DIAGNOSIS — I10 ESSENTIAL HYPERTENSION, BENIGN: ICD-10-CM

## 2021-10-28 DIAGNOSIS — R06.09 DOE (DYSPNEA ON EXERTION): ICD-10-CM

## 2021-10-28 DIAGNOSIS — Z98.84 BARIATRIC SURGERY STATUS: ICD-10-CM

## 2021-10-28 DIAGNOSIS — Z00.00 ROUTINE ADULT HEALTH MAINTENANCE: Primary | ICD-10-CM

## 2021-10-28 DIAGNOSIS — E11.29 TYPE 2 DIABETES MELLITUS WITH MICROALBUMINURIA, WITH LONG-TERM CURRENT USE OF INSULIN (H): ICD-10-CM

## 2021-10-28 DIAGNOSIS — E55.9 VITAMIN D DEFICIENCY: ICD-10-CM

## 2021-10-28 DIAGNOSIS — L98.9 HAND LESION: ICD-10-CM

## 2021-10-28 DIAGNOSIS — E03.9 HYPOTHYROIDISM, UNSPECIFIED TYPE: ICD-10-CM

## 2021-10-28 DIAGNOSIS — Z11.59 NEED FOR HEPATITIS C SCREENING TEST: ICD-10-CM

## 2021-10-28 DIAGNOSIS — R80.9 TYPE 2 DIABETES MELLITUS WITH MICROALBUMINURIA, WITH LONG-TERM CURRENT USE OF INSULIN (H): ICD-10-CM

## 2021-10-28 DIAGNOSIS — K21.9 GASTROESOPHAGEAL REFLUX DISEASE, UNSPECIFIED WHETHER ESOPHAGITIS PRESENT: ICD-10-CM

## 2021-10-28 DIAGNOSIS — Z12.31 VISIT FOR SCREENING MAMMOGRAM: ICD-10-CM

## 2021-10-28 PROBLEM — N18.30 CHRONIC KIDNEY DISEASE, STAGE 3 (H): Status: ACTIVE | Noted: 2021-10-28

## 2021-10-28 PROBLEM — E11.21 MICROALBUMINURIC DIABETIC NEPHROPATHY (H): Status: ACTIVE | Noted: 2020-10-20

## 2021-10-28 LAB
ALBUMIN SERPL-MCNC: 3.4 G/DL (ref 3.5–5)
ALBUMIN UR-MCNC: NEGATIVE MG/DL
ALP SERPL-CCNC: 126 U/L (ref 45–120)
ALT SERPL W P-5'-P-CCNC: 22 U/L (ref 0–45)
ANION GAP SERPL CALCULATED.3IONS-SCNC: 10 MMOL/L (ref 5–18)
APPEARANCE UR: CLEAR
AST SERPL W P-5'-P-CCNC: 18 U/L (ref 0–40)
BACTERIA #/AREA URNS HPF: ABNORMAL /HPF
BILIRUB SERPL-MCNC: 0.9 MG/DL (ref 0–1)
BILIRUB UR QL STRIP: NEGATIVE
BUN SERPL-MCNC: 20 MG/DL (ref 8–22)
CALCIUM SERPL-MCNC: 10.1 MG/DL (ref 8.5–10.5)
CHLORIDE BLD-SCNC: 101 MMOL/L (ref 98–107)
CHOLEST SERPL-MCNC: 142 MG/DL
CO2 SERPL-SCNC: 26 MMOL/L (ref 22–31)
COLOR UR AUTO: YELLOW
CREAT SERPL-MCNC: 1.24 MG/DL (ref 0.6–1.1)
ERYTHROCYTE [DISTWIDTH] IN BLOOD BY AUTOMATED COUNT: 11.9 % (ref 10–15)
FASTING STATUS PATIENT QL REPORTED: NORMAL
GFR SERPL CREATININE-BSD FRML MDRD: 46 ML/MIN/1.73M2
GLUCOSE BLD-MCNC: 300 MG/DL (ref 70–125)
GLUCOSE UR STRIP-MCNC: >=1000 MG/DL
HBA1C MFR BLD: 8.2 % (ref 0–5.6)
HCT VFR BLD AUTO: 46.9 % (ref 35–47)
HDLC SERPL-MCNC: 53 MG/DL
HGB BLD-MCNC: 15.3 G/DL (ref 11.7–15.7)
HGB UR QL STRIP: ABNORMAL
KETONES UR STRIP-MCNC: NEGATIVE MG/DL
LDLC SERPL CALC-MCNC: 71 MG/DL
LEUKOCYTE ESTERASE UR QL STRIP: NEGATIVE
MCH RBC QN AUTO: 33.1 PG (ref 26.5–33)
MCHC RBC AUTO-ENTMCNC: 32.6 G/DL (ref 31.5–36.5)
MCV RBC AUTO: 102 FL (ref 78–100)
NITRATE UR QL: NEGATIVE
PH UR STRIP: 5 [PH] (ref 5–8)
PLATELET # BLD AUTO: 287 10E3/UL (ref 150–450)
POTASSIUM BLD-SCNC: 4.8 MMOL/L (ref 3.5–5)
PROT SERPL-MCNC: 7.8 G/DL (ref 6–8)
RBC # BLD AUTO: 4.62 10E6/UL (ref 3.8–5.2)
RBC #/AREA URNS AUTO: ABNORMAL /HPF
SODIUM SERPL-SCNC: 137 MMOL/L (ref 136–145)
SP GR UR STRIP: >=1.03 (ref 1–1.03)
SQUAMOUS #/AREA URNS AUTO: ABNORMAL /LPF
TRIGL SERPL-MCNC: 89 MG/DL
TSH SERPL DL<=0.005 MIU/L-ACNC: 0.91 UIU/ML (ref 0.3–5)
UROBILINOGEN UR STRIP-ACNC: 0.2 E.U./DL
WBC # BLD AUTO: 8.2 10E3/UL (ref 4–11)
WBC #/AREA URNS AUTO: ABNORMAL /HPF

## 2021-10-28 PROCEDURE — 91300 COVID-19,PF,PFIZER (12+ YRS): CPT | Performed by: INTERNAL MEDICINE

## 2021-10-28 PROCEDURE — 81001 URINALYSIS AUTO W/SCOPE: CPT | Performed by: INTERNAL MEDICINE

## 2021-10-28 PROCEDURE — 83036 HEMOGLOBIN GLYCOSYLATED A1C: CPT | Performed by: INTERNAL MEDICINE

## 2021-10-28 PROCEDURE — 84443 ASSAY THYROID STIM HORMONE: CPT | Performed by: INTERNAL MEDICINE

## 2021-10-28 PROCEDURE — 99214 OFFICE O/P EST MOD 30 MIN: CPT | Mod: 25 | Performed by: INTERNAL MEDICINE

## 2021-10-28 PROCEDURE — 85027 COMPLETE CBC AUTOMATED: CPT | Performed by: INTERNAL MEDICINE

## 2021-10-28 PROCEDURE — 93010 ELECTROCARDIOGRAM REPORT: CPT | Performed by: INTERNAL MEDICINE

## 2021-10-28 PROCEDURE — 86803 HEPATITIS C AB TEST: CPT | Performed by: INTERNAL MEDICINE

## 2021-10-28 PROCEDURE — 71046 X-RAY EXAM CHEST 2 VIEWS: CPT | Mod: TC | Performed by: RADIOLOGY

## 2021-10-28 PROCEDURE — 99396 PREV VISIT EST AGE 40-64: CPT | Mod: 25 | Performed by: INTERNAL MEDICINE

## 2021-10-28 PROCEDURE — 36415 COLL VENOUS BLD VENIPUNCTURE: CPT | Performed by: INTERNAL MEDICINE

## 2021-10-28 PROCEDURE — 93005 ELECTROCARDIOGRAM TRACING: CPT | Performed by: INTERNAL MEDICINE

## 2021-10-28 PROCEDURE — 0004A COVID-19,PF,PFIZER (12+ YRS): CPT | Performed by: INTERNAL MEDICINE

## 2021-10-28 PROCEDURE — 80061 LIPID PANEL: CPT | Performed by: INTERNAL MEDICINE

## 2021-10-28 PROCEDURE — 80053 COMPREHEN METABOLIC PANEL: CPT | Performed by: INTERNAL MEDICINE

## 2021-10-28 RX ORDER — FLASH GLUCOSE SENSOR
KIT MISCELLANEOUS
COMMUNITY
End: 2021-11-22

## 2021-10-28 RX ORDER — INSULIN GLARGINE 100 [IU]/ML
INJECTION, SOLUTION SUBCUTANEOUS 2 TIMES DAILY
COMMUNITY
End: 2021-12-23

## 2021-10-28 RX ORDER — TRAZODONE HYDROCHLORIDE 100 MG/1
TABLET ORAL
Qty: 90 TABLET | Refills: 1 | Status: SHIPPED | OUTPATIENT
Start: 2021-10-28 | End: 2022-05-07

## 2021-10-28 RX ORDER — TRAZODONE HYDROCHLORIDE 100 MG/1
100 TABLET ORAL AT BEDTIME
COMMUNITY
End: 2021-10-28

## 2021-10-28 RX ORDER — LEVOTHYROXINE SODIUM 75 UG/1
75 TABLET ORAL DAILY
COMMUNITY
End: 2021-11-17

## 2021-10-28 ASSESSMENT — MIFFLIN-ST. JEOR: SCORE: 2224.65

## 2021-10-28 NOTE — LETTER
November 2, 2021      Corinna Farias  [address]   [place] [address]        Dear ,    We are writing to inform you of your test results.    Corinna, your blood sugar was quite high when you were in, your A1C is up, and you were spilling a large amount of sugar in the urine as well.  Kidney function may be suffering due to the diabetes.  I would like you to touch base with your endocrinology provider ASAP to have an insulin adjust so we can get those sugars down.         Resulted Orders   Hepatitis C Screen Reflex to HCV RNA Quant and Genotype   Result Value Ref Range    Hepatitis C Antibody Nonreactive Nonreactive    Narrative    Assay performance characteristics have not been established for newborns, infants, and children.   Hemoglobin A1c   Result Value Ref Range    Hemoglobin A1C 8.2 (H) 0.0 - 5.6 %      Comment:      Normal <5.7%   Prediabetes 5.7-6.4%    Diabetes 6.5% or higher     Note: Adopted from ADA consensus guidelines.   Comprehensive metabolic panel (BMP + Alb, Alk Phos, ALT, AST, Total. Bili, TP)   Result Value Ref Range    Sodium 137 136 - 145 mmol/L    Potassium 4.8 3.5 - 5.0 mmol/L    Chloride 101 98 - 107 mmol/L    Carbon Dioxide (CO2) 26 22 - 31 mmol/L    Anion Gap 10 5 - 18 mmol/L    Urea Nitrogen 20 8 - 22 mg/dL    Creatinine 1.24 (H) 0.60 - 1.10 mg/dL    Calcium 10.1 8.5 - 10.5 mg/dL    Glucose 300 (H) 70 - 125 mg/dL    Alkaline Phosphatase 126 (H) 45 - 120 U/L    AST 18 0 - 40 U/L    ALT 22 0 - 45 U/L    Protein Total 7.8 6.0 - 8.0 g/dL    Albumin 3.4 (L) 3.5 - 5.0 g/dL    Bilirubin Total 0.9 0.0 - 1.0 mg/dL    GFR Estimate 46 (L) >60 mL/min/1.73m2      Comment:      As of July 11, 2021, eGFR is calculated by the CKD-EPI creatinine equation, without race adjustment. eGFR can be influenced by muscle mass, exercise, and diet. The reported eGFR is an estimation only and is only applicable if the renal function is stable.   Lipid panel reflex to direct LDL Fasting   Result  Value Ref Range    Cholesterol 142 <=199 mg/dL    Triglycerides 89 <=149 mg/dL    Direct Measure HDL 53 >=50 mg/dL      Comment:      HDL Cholesterol Reference Range:     0-2 years:   No reference ranges established for patients under 2 years old  at [facility] for lipid analytes.    2-8 years:  Greater than 45 mg/dL     18 years and older:   Female: Greater than or equal to 50 mg/dL   Male:   Greater than or equal to 40 mg/dL    LDL Cholesterol Calculated 71 <=129 mg/dL    Patient Fasting > 8hrs? Unknown    CBC with platelets   Result Value Ref Range    WBC Count 8.2 4.0 - 11.0 10e3/uL    RBC Count 4.62 3.80 - 5.20 10e6/uL    Hemoglobin 15.3 11.7 - 15.7 g/dL    Hematocrit 46.9 35.0 - 47.0 %     (H) 78 - 100 fL    MCH 33.1 (H) 26.5 - 33.0 pg    MCHC 32.6 31.5 - 36.5 g/dL    RDW 11.9 10.0 - 15.0 %    Platelet Count 287 150 - 450 10e3/uL   UA Macro with Reflex to Micro and Culture - lab collect   Result Value Ref Range    Color Urine Yellow Colorless, Straw, Light Yellow, Yellow    Appearance Urine Clear Clear    Glucose Urine >=1000 (A) Negative mg/dL    Bilirubin Urine Negative Negative    Ketones Urine Negative Negative mg/dL    Specific Gravity Urine >=1.030 1.005 - 1.030    Blood Urine Trace (A) Negative    pH Urine 5.0 5.0 - 8.0    Protein Albumin Urine Negative Negative mg/dL    Urobilinogen Urine 0.2 0.2, 1.0 E.U./dL    Nitrite Urine Negative Negative    Leukocyte Esterase Urine Negative Negative   TSH with free T4 reflex   Result Value Ref Range    TSH 0.91 0.30 - 5.00 uIU/mL   Urine Microscopic   Result Value Ref Range    Bacteria Urine Few (A) None Seen /HPF    RBC Urine 0-2 0-2 /HPF /HPF    WBC Urine 0-5 0-5 /HPF /HPF    Squamous Epithelials Urine Moderate (A) None Seen /LPF    Narrative    Urine Culture not indicated       If you have any questions or concerns, please call the clinic at the number listed above.       Sincerely,      Masood Melton MD
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
## 2021-10-29 LAB
ATRIAL RATE - MUSE: 97 BPM
DIASTOLIC BLOOD PRESSURE - MUSE: NORMAL MMHG
HCV AB SERPL QL IA: NONREACTIVE
INTERPRETATION ECG - MUSE: NORMAL
P AXIS - MUSE: 39 DEGREES
PR INTERVAL - MUSE: 144 MS
QRS DURATION - MUSE: 84 MS
QT - MUSE: 340 MS
QTC - MUSE: 431 MS
R AXIS - MUSE: 61 DEGREES
SYSTOLIC BLOOD PRESSURE - MUSE: NORMAL MMHG
T AXIS - MUSE: 30 DEGREES
VENTRICULAR RATE- MUSE: 97 BPM

## 2021-10-30 DIAGNOSIS — E10.9 TYPE 1 DIABETES MELLITUS WITHOUT COMPLICATION (H): ICD-10-CM

## 2021-11-01 RX ORDER — INSULIN ASPART 100 [IU]/ML
INJECTION, SOLUTION INTRAVENOUS; SUBCUTANEOUS
Qty: 18 ML | Refills: 2 | Status: SHIPPED | OUTPATIENT
Start: 2021-11-01 | End: 2021-11-26

## 2021-11-02 ENCOUNTER — TELEPHONE (OUTPATIENT)
Dept: ENDOCRINOLOGY | Facility: CLINIC | Age: 66
End: 2021-11-02

## 2021-11-02 NOTE — TELEPHONE ENCOUNTER
Kam[facility] Team    Patient met with PCP and did labs. PCP suggest for us to review labs completed with them and adjust insulin, possibly add more insulin?     she can be reached @ [phone]

## 2021-11-03 NOTE — TELEPHONE ENCOUNTER
Date: 11/12/2021 Status: Scheduled   Time: 8:30 AM Length: 30   Visit Type: VIDEO VISIT RETURN [7629] Copay: $0.00   Provider: Rossy Baig NP

## 2021-11-08 ASSESSMENT — ENCOUNTER SYMPTOMS
NERVOUS/ANXIOUS: 0
PANIC: 0
SNORES LOUDLY: 0
POSTURAL DYSPNEA: 0
COUGH: 0
COUGH DISTURBING SLEEP: 0
SPUTUM PRODUCTION: 0
DEPRESSION: 0
WHEEZING: 0
DYSPNEA ON EXERTION: 1
SHORTNESS OF BREATH: 1
HEMOPTYSIS: 0
INSOMNIA: 1
DECREASED CONCENTRATION: 0

## 2021-11-09 NOTE — PROGRESS NOTES
"Corinna is a 66 year old who is being evaluated via a billable video visit.      How would you like to obtain your AVS? MyChart  If the video visit is dropped, the invitation should be resent by: Text to cell phone: [phone]  Will anyone else be joining your video visit? No      Video Start Time: 0830    [facility] ENDOCRINOLOGY    Diabetes Note 2021    Corinna Farias, 1955, [id number]          Reason for visit      1. Type 2 diabetes mellitus with microalbuminuria, with long-term current use of insulin (H)        HPI     Corinna Farias is a very pleasant 66 year old old female who presents for follow up.  SUMMARY:    Corinna is contacted today via Video Visit in f/u for DM 2. Her current A1c is 8.2 and up from her last at 7.5. She feels that this is more of a reflection of her missing insulin doses than what she is eating. She notes that she generally doesn't go out much, in spite of getting all of her COVID vaccines, including Booster and Flu. She is using the Freestyle Waldemar 1. She is currently taking 55 units of Basaglar in the morning and 40 units at HS. Most of her FBS are good. She is taking Novolog with her meals \"depending upon what I am eating\".         Blood Glucose Lo/05  5:26   9:33 291  7:33       2:18   5:28   8:15   12 PM 81  3:50 PM 74  7:454       1   6:28   9:12   5:21   10:18     1108  12   5:29 115  10:30   3:12 PM 73  9       1   5:30   9:23   11:50   7 pm 132    11/10  12:40   5:30   10:40   4   8:10       5:30   10:30   1:20   9:15     11  12:50   2   5:30         Past Medical History     Patient Active Problem List   Diagnosis     Bariatric surgery status (GASTRIC BYPASS)     Vitamin D deficiency     Essential hypertension, benign     GERD (gastroesophageal " reflux disease)     Hypothyroidism     Microalbuminuric diabetic nephropathy (H)     Morbid obesity (H)     GIOVANI (obstructive sleep apnea)     Type 2 diabetes mellitus with microalbuminuria, with long-term current use of insulin (H)     Chronic kidney disease, stage 3     Cervical cancer screening     Encounter for long-term (current) use of insulin (H)     Female climacteric state     Insomnia     Insulin pump status     Pseudophakia     Type 1 diabetes, uncontrolled, with neuropathy (H)        Family History       family history includes Diabetes in her father and mother.    Social History      reports that she has never smoked. She has never used smokeless tobacco. She reports that she does not drink alcohol and does not use drugs.      Review of Systems     Patient has no polyuria or polydipsia, no chest pain, dyspnea or TIA's, no numbness, tingling or pain in extremities  Remainder negative except as noted in HPI.    Answers for HPI/ROS submitted by the patient on 11/8/2021  General Symptoms: No  Skin Symptoms: No  HENT Symptoms: No  EYE SYMPTOMS: No  HEART SYMPTOMS: No  LUNG SYMPTOMS: Yes  INTESTINAL SYMPTOMS: No  URINARY SYMPTOMS: No  GYNECOLOGIC SYMPTOMS: No  BREAST SYMPTOMS: No  SKELETAL SYMPTOMS: No  BLOOD SYMPTOMS: No  NERVOUS SYSTEM SYMPTOMS: No  MENTAL HEALTH SYMPTOMS: Yes  Cough: No  Sputum or phlegm: No  Coughing up blood: No  Difficulty breating or shortness of breath: Yes  Snoring: No  Wheezing: No  Difficulty breathing on exertion: Yes  Nighttime Cough: No  Difficulty breathing when lying flat: No  Nervous or Anxious: No  Depression: No  Trouble sleeping: Yes  Trouble thinking or concentrating: No  Mood changes: No  Panic attacks: No      Vital Signs     LMP  (LMP Unknown)   Wt Readings from Last 3 Encounters:   10/28/21 (!) 167.4 kg (369 lb)   05/04/21 (!) 162.8 kg (359 lb)   04/29/21 (!) 162.8 kg (359 lb)       Physical Exam     Constitutional:  Well developed, Well nourished  HENT:   Normocephalic,   Neck: normal in appearance  Eyes:  PERRL, Conjunctiva pink  Respiratory:  No respiratory distress  Skin: No acanthosis nigricans, lipoatrophy or lipodystrophy  Neurologic:  Alert & oriented x 3, nonfocal  Psychiatric:  Affect, Mood, Insight appropriate          Assessment     1. Type 2 diabetes mellitus with microalbuminuria, with long-term current use of insulin (H)        Plan     With discussion, will increase AM dose of Basaglar to 60 units. Hoping that this will help with daytime elevations. Will watch for a week, and if no better, then will go up another 5 units. If she starts having lows, she will go back down. No changes to her Novolog. Encouraged to try and figure out how not to forget her doses. She will f/u with me in 3 months.       Rossy Baig NP  [facility]  11/12/2021  12:41 PM        Lab Results     Microalbumin Urine mg/dL   Date Value Ref Range Status   10/15/2020 6.40 (H) 0.00 - 1.99 mg/dL Final       Cholesterol   Date Value Ref Range Status   10/28/2021 142 <=199 mg/dL Final     Direct Measure HDL   Date Value Ref Range Status   10/28/2021 53 >=50 mg/dL Final     Comment:     HDL Cholesterol Reference Range:     0-2 years:   No reference ranges established for patients under 2 years old  at [facility] for lipid analytes.    2-8 years:  Greater than 45 mg/dL     18 years and older:   Female: Greater than or equal to 50 mg/dL   Male:   Greater than or equal to 40 mg/dL     Triglycerides   Date Value Ref Range Status   10/28/2021 89 <=149 mg/dL Final             Current Medications     Outpatient Medications Prior to Visit   Medication Sig Dispense Refill     ASPIRIN PO Take 81 mg by mouth       ATORVASTATIN CALCIUM PO Take 20 mg by mouth daily       Continuous Blood Gluc Sensor (FREESTYLE [place] 14 DAY SENSOR) MISC Change every 14 days.       insulin glargine (BASAGLAR KWIKPEN) 100 UNIT/ML pen Inject Subcutaneous 2 times daily Inject subcutaneously 40 units in  the morning and 45 units in the evening.       levothyroxine (SYNTHROID/LEVOTHROID) 75 MCG tablet Take 75 mcg by mouth daily       LISINOPRIL PO Take 10 mg by mouth daily        multivitamin (ONE-DAILY) tablet Take 1 tablet by mouth daily 90 tablet 3     NOVOLOG FLEXPEN 100 UNIT/ML soln USE PER SLIDING SCALE UP TO 60 UNITS DAILY AS DIRECTED 18 mL 2     pantoprazole (PROTONIX) 40 MG EC tablet TAKE 1 TABLET(40 MG) BY MOUTH DAILY 90 tablet 1     traZODone (DESYREL) 100 MG tablet TAKE ONE TABLET AT BEDTIME AS NEEDED FOR SLEEP 90 tablet 1     VITAMIN D, CHOLECALCIFEROL, PO Take 50,000 Units by mouth once a week       No facility-administered medications prior to visit.           Video-Visit Details    Type of service:  Video Visit    Video End Time: 0850    Originating Location (pt. Location): Home    Distant Location (provider location):  [facility]     Platform used for Video Visit: Doximity    Date of last OV: 6/29/21  Reason for Visit: DM

## 2021-11-12 ENCOUNTER — VIRTUAL VISIT (OUTPATIENT)
Dept: ENDOCRINOLOGY | Facility: CLINIC | Age: 66
End: 2021-11-12
Payer: COMMERCIAL

## 2021-11-12 DIAGNOSIS — E11.29 TYPE 2 DIABETES MELLITUS WITH MICROALBUMINURIA, WITH LONG-TERM CURRENT USE OF INSULIN (H): Primary | ICD-10-CM

## 2021-11-12 DIAGNOSIS — Z79.4 TYPE 2 DIABETES MELLITUS WITH MICROALBUMINURIA, WITH LONG-TERM CURRENT USE OF INSULIN (H): Primary | ICD-10-CM

## 2021-11-12 DIAGNOSIS — R80.9 TYPE 2 DIABETES MELLITUS WITH MICROALBUMINURIA, WITH LONG-TERM CURRENT USE OF INSULIN (H): Primary | ICD-10-CM

## 2021-11-12 PROCEDURE — 99213 OFFICE O/P EST LOW 20 MIN: CPT | Mod: 95 | Performed by: NURSE PRACTITIONER

## 2021-11-16 DIAGNOSIS — E11.8 TYPE 2 DIABETES MELLITUS WITH COMPLICATION, WITH LONG-TERM CURRENT USE OF INSULIN (H): Primary | ICD-10-CM

## 2021-11-16 DIAGNOSIS — E03.4 HYPOTHYROIDISM DUE TO ACQUIRED ATROPHY OF THYROID: ICD-10-CM

## 2021-11-16 DIAGNOSIS — Z79.4 TYPE 2 DIABETES MELLITUS WITH COMPLICATION, WITH LONG-TERM CURRENT USE OF INSULIN (H): Primary | ICD-10-CM

## 2021-11-17 RX ORDER — ATORVASTATIN CALCIUM 20 MG/1
TABLET, FILM COATED ORAL
Qty: 90 TABLET | Refills: 2 | Status: SHIPPED | OUTPATIENT
Start: 2021-11-17 | End: 2022-08-26

## 2021-11-17 RX ORDER — LEVOTHYROXINE SODIUM 75 UG/1
TABLET ORAL
Qty: 90 TABLET | Refills: 2 | Status: SHIPPED | OUTPATIENT
Start: 2021-11-17 | End: 2023-11-29

## 2021-11-17 NOTE — TELEPHONE ENCOUNTER
"Last Written Prescription Date:  1/28/21  Last Fill Quantity: 90,  # refills: 3   Last office visit provider:      Last Written Prescription Date:  1/28/21  Last Fill Quantity: 90,  # refills: 3     Requested Prescriptions   Pending Prescriptions Disp Refills     atorvastatin (LIPITOR) 20 MG tablet [Pharmacy Med Name: ATORVASTATIN 20MG TABLETS] 90 tablet      Sig: TAKE 1 TABLET(20 MG) BY MOUTH DAILY       Statins Protocol Passed - 11/16/2021  5:29 AM        Passed - LDL on file in past 12 months     Recent Labs   Lab Test 10/28/21  1456   LDL 71             Passed - No abnormal creatine kinase in past 12 months     No lab results found.             Passed - Recent (12 mo) or future (30 days) visit within the authorizing provider's specialty     Patient has had an office visit with the authorizing provider or a provider within the authorizing providers department within the previous 12 mos or has a future within next 30 days. See \"Patient Info\" tab in inbasket, or \"Choose Columns\" in Meds & Orders section of the refill encounter.              Passed - Medication is active on med list        Passed - Patient is age 18 or older        Passed - No active pregnancy on record        Passed - No positive pregnancy test in past 12 months           levothyroxine (SYNTHROID/LEVOTHROID) 75 MCG tablet [Pharmacy Med Name: LEVOTHYROXINE 0.075MG (75MCG) TABS] 90 tablet      Sig: TAKE ONE TABLET AT LEAST ONE HOUR BEFORE OR TWO HOURS AFTER A MEAL       Thyroid Protocol Passed - 11/16/2021  5:29 AM        Passed - Patient is 12 years or older        Passed - Recent (12 mo) or future (30 days) visit within the authorizing provider's specialty     Patient has had an office visit with the authorizing provider or a provider within the authorizing providers department within the previous 12 mos or has a future within next 30 days. See \"Patient Info\" tab in inbasket, or \"Choose Columns\" in Meds & Orders section of the refill encounter.  "             Passed - Medication is active on med list        Passed - Normal TSH on file in past 12 months     Recent Labs   Lab Test 10/28/21  1456   TSH 0.91              Passed - No active pregnancy on record     If patient is pregnant or has had a positive pregnancy test, please check TSH.          Passed - No positive pregnancy test in past 12 months     If patient is pregnant or has had a positive pregnancy test, please check TSH.               Grecia Dewey RN 11/17/21 3:57 PM

## 2021-11-20 DIAGNOSIS — E11.29 TYPE 2 DIABETES MELLITUS WITH MICROALBUMINURIA, WITH LONG-TERM CURRENT USE OF INSULIN (H): Primary | ICD-10-CM

## 2021-11-20 DIAGNOSIS — Z79.4 TYPE 2 DIABETES MELLITUS WITH MICROALBUMINURIA, WITH LONG-TERM CURRENT USE OF INSULIN (H): Primary | ICD-10-CM

## 2021-11-20 DIAGNOSIS — R80.9 TYPE 2 DIABETES MELLITUS WITH MICROALBUMINURIA, WITH LONG-TERM CURRENT USE OF INSULIN (H): Primary | ICD-10-CM

## 2021-11-22 RX ORDER — FLASH GLUCOSE SENSOR
KIT MISCELLANEOUS
Qty: 6 EACH | Refills: 1 | Status: SHIPPED | OUTPATIENT
Start: 2021-11-22 | End: 2022-05-11

## 2021-11-24 ENCOUNTER — TELEPHONE (OUTPATIENT)
Dept: ENDOCRINOLOGY | Facility: CLINIC | Age: 66
End: 2021-11-24
Payer: COMMERCIAL

## 2021-11-24 DIAGNOSIS — E10.9 TYPE 1 DIABETES MELLITUS WITHOUT COMPLICATION (H): ICD-10-CM

## 2021-11-24 NOTE — TELEPHONE ENCOUNTER
[facility] Call Center    Phone Message    May a detailed message be left on voicemail: yes     Reason for Call: Medication Question or concern regarding medication   Prescription Clarification    Name of Medication:   * NOVOLOG FLEXPEN 100 UNIT/ML soln    Prescribing Provider: Safia     Pharmacy: [id number] - [place], [place] - [facility] [address] AT [facility] [address]     What on the order needs clarification? Per Patient is wanting to get 3 additional pens a month. Patient states she is barely making it to the end of each month with the current prescription and states she will not have enough for this month. Please advise.       Action Taken: Message routed to:  [facility] ([facility] Endo    Travel Screening: Not Applicable

## 2021-11-24 NOTE — TELEPHONE ENCOUNTER
Spoke with patient,     Pt is not sure how much insulin she is taking. Pt states she takes it even if she has a glass of milk. Pt thinks she is using more then 60units a day.     Asked patient to write down how much she gives herself every time, and call back Friday with the numbers.

## 2021-11-26 RX ORDER — INSULIN ASPART 100 [IU]/ML
INJECTION, SOLUTION INTRAVENOUS; SUBCUTANEOUS
Qty: 30 ML | Refills: 2 | Status: SHIPPED | OUTPATIENT
Start: 2021-11-26 | End: 2022-02-11

## 2021-11-26 NOTE — TELEPHONE ENCOUNTER
Spoke with pt:    In the last two days she has used,    Totals number of insulin for the day: 24th 47 units  25th 78 units    Patient uses the novolog every time she eats or drinks anything, using it more then just with meals throughout the days.

## 2021-11-26 NOTE — TELEPHONE ENCOUNTER
[facility] Call Center    Phone Message    May a detailed message be left on voicemail: yes     Reason for Call: Other: Pt says she is calling back to give numbers that the nurse was requesting. Pt is requesting call back at #[phone] to give information.      Action Taken: Other: Endo    Travel Screening: Not Applicable

## 2021-12-21 ENCOUNTER — TRANSFERRED RECORDS (OUTPATIENT)
Dept: HEALTH INFORMATION MANAGEMENT | Facility: CLINIC | Age: 66
End: 2021-12-21
Payer: COMMERCIAL

## 2021-12-21 LAB — RETINOPATHY: NEGATIVE

## 2021-12-23 DIAGNOSIS — E10.9 TYPE 1 DIABETES MELLITUS WITHOUT COMPLICATION (H): Primary | ICD-10-CM

## 2021-12-23 RX ORDER — INSULIN GLARGINE 100 [IU]/ML
INJECTION, SOLUTION SUBCUTANEOUS
Qty: 75 ML | Refills: 0 | Status: SHIPPED | OUTPATIENT
Start: 2021-12-23 | End: 2022-03-03

## 2022-01-12 VITALS — WEIGHT: 293 LBS | HEIGHT: 65 IN | BODY MASS INDEX: 48.82 KG/M2

## 2022-01-18 VITALS
WEIGHT: 293 LBS | HEIGHT: 65 IN | HEART RATE: 88 BPM | DIASTOLIC BLOOD PRESSURE: 74 MMHG | SYSTOLIC BLOOD PRESSURE: 126 MMHG | OXYGEN SATURATION: 98 % | BODY MASS INDEX: 48.82 KG/M2

## 2022-01-18 VITALS
OXYGEN SATURATION: 96 % | TEMPERATURE: 97 F | HEART RATE: 102 BPM | HEIGHT: 65 IN | SYSTOLIC BLOOD PRESSURE: 122 MMHG | BODY MASS INDEX: 48.82 KG/M2 | DIASTOLIC BLOOD PRESSURE: 84 MMHG | WEIGHT: 293 LBS

## 2022-01-18 VITALS
HEART RATE: 90 BPM | WEIGHT: 293 LBS | HEIGHT: 65 IN | SYSTOLIC BLOOD PRESSURE: 124 MMHG | DIASTOLIC BLOOD PRESSURE: 80 MMHG | BODY MASS INDEX: 48.82 KG/M2 | TEMPERATURE: 97.2 F | OXYGEN SATURATION: 96 %

## 2022-02-11 DIAGNOSIS — E10.9 TYPE 1 DIABETES MELLITUS WITHOUT COMPLICATION (H): ICD-10-CM

## 2022-02-11 RX ORDER — INSULIN ASPART 100 [IU]/ML
INJECTION, SOLUTION INTRAVENOUS; SUBCUTANEOUS
Qty: 30 ML | Refills: 0 | Status: SHIPPED | OUTPATIENT
Start: 2022-02-11 | End: 2022-03-11

## 2022-02-22 DIAGNOSIS — I10 ESSENTIAL HYPERTENSION, BENIGN: Primary | ICD-10-CM

## 2022-02-22 RX ORDER — LISINOPRIL 10 MG/1
10 TABLET ORAL DAILY
Qty: 90 TABLET | Refills: 3 | Status: SHIPPED | OUTPATIENT
Start: 2022-02-22 | End: 2022-03-02

## 2022-02-22 NOTE — TELEPHONE ENCOUNTER
Reason for Call: patient is calling for a refill of    LISINOPRIL PO     --   Sig - Route: Take 20 mg once a day       SEND TO [facility] [place]    Detailed comments: LAST SEEN 10/2021. Pharmacy states they have sent requests.    Phone Number Patient can be reached at: Home number on file [phone] (home)    Best Time: any    Can we leave a detailed message on this number? YES    Call taken on 2/22/2022 at 1:16 PM by Venice Lechuga

## 2022-03-01 NOTE — TELEPHONE ENCOUNTER
Pt is calling and wondering if she should be decreasing her meds?  She normally takes 20 mgs.  She did  the 10 mgs from the pharm     Please clarify and call pt to let her know.

## 2022-03-01 NOTE — TELEPHONE ENCOUNTER
Patient should not be changing her medication dosing.  She should continue with the 20 mg.  Apologize for the error and forward this to the management

## 2022-03-02 DIAGNOSIS — I10 ESSENTIAL HYPERTENSION, BENIGN: Primary | ICD-10-CM

## 2022-03-02 RX ORDER — LISINOPRIL 20 MG/1
20 TABLET ORAL DAILY
Qty: 90 TABLET | Refills: 2 | Status: SHIPPED | OUTPATIENT
Start: 2022-03-02 | End: 2023-01-27

## 2022-03-02 NOTE — TELEPHONE ENCOUNTER
Patient called and notified information below. I have forward Rx renewal for 20 mg tablets to local pharmacy.     Message will be forward to management tem.

## 2022-03-03 DIAGNOSIS — E10.9 TYPE 1 DIABETES MELLITUS WITHOUT COMPLICATION (H): ICD-10-CM

## 2022-03-03 RX ORDER — INSULIN GLARGINE 100 [IU]/ML
INJECTION, SOLUTION SUBCUTANEOUS
Qty: 30 ML | Refills: 0 | Status: SHIPPED | OUTPATIENT
Start: 2022-03-03 | End: 2022-04-01

## 2022-03-08 DIAGNOSIS — R80.9 TYPE 2 DIABETES MELLITUS WITH MICROALBUMINURIA, WITH LONG-TERM CURRENT USE OF INSULIN (H): Primary | ICD-10-CM

## 2022-03-08 DIAGNOSIS — E11.29 TYPE 2 DIABETES MELLITUS WITH MICROALBUMINURIA, WITH LONG-TERM CURRENT USE OF INSULIN (H): Primary | ICD-10-CM

## 2022-03-08 DIAGNOSIS — Z79.4 TYPE 2 DIABETES MELLITUS WITH MICROALBUMINURIA, WITH LONG-TERM CURRENT USE OF INSULIN (H): Primary | ICD-10-CM

## 2022-03-11 DIAGNOSIS — E10.9 TYPE 1 DIABETES MELLITUS WITHOUT COMPLICATION (H): ICD-10-CM

## 2022-03-11 RX ORDER — INSULIN ASPART 100 [IU]/ML
INJECTION, SOLUTION INTRAVENOUS; SUBCUTANEOUS
Qty: 30 ML | Refills: 0 | Status: SHIPPED | OUTPATIENT
Start: 2022-03-11 | End: 2022-04-07

## 2022-03-14 ENCOUNTER — LAB (OUTPATIENT)
Dept: LAB | Facility: CLINIC | Age: 67
End: 2022-03-14
Payer: COMMERCIAL

## 2022-03-14 DIAGNOSIS — E11.29 TYPE 2 DIABETES MELLITUS WITH MICROALBUMINURIA, WITH LONG-TERM CURRENT USE OF INSULIN (H): ICD-10-CM

## 2022-03-14 DIAGNOSIS — Z79.4 TYPE 2 DIABETES MELLITUS WITH MICROALBUMINURIA, WITH LONG-TERM CURRENT USE OF INSULIN (H): ICD-10-CM

## 2022-03-14 DIAGNOSIS — R80.9 TYPE 2 DIABETES MELLITUS WITH MICROALBUMINURIA, WITH LONG-TERM CURRENT USE OF INSULIN (H): ICD-10-CM

## 2022-03-14 LAB
ANION GAP SERPL CALCULATED.3IONS-SCNC: 10 MMOL/L (ref 5–18)
BUN SERPL-MCNC: 19 MG/DL (ref 8–22)
CALCIUM SERPL-MCNC: 9.5 MG/DL (ref 8.5–10.5)
CHLORIDE BLD-SCNC: 103 MMOL/L (ref 98–107)
CO2 SERPL-SCNC: 25 MMOL/L (ref 22–31)
CREAT SERPL-MCNC: 0.98 MG/DL (ref 0.6–1.1)
GFR SERPL CREATININE-BSD FRML MDRD: 64 ML/MIN/1.73M2
GLUCOSE BLD-MCNC: 131 MG/DL (ref 70–125)
HBA1C MFR BLD: 7.8 % (ref 0–5.6)
POTASSIUM BLD-SCNC: 4.3 MMOL/L (ref 3.5–5)
SODIUM SERPL-SCNC: 138 MMOL/L (ref 136–145)

## 2022-03-14 PROCEDURE — 83036 HEMOGLOBIN GLYCOSYLATED A1C: CPT

## 2022-03-14 PROCEDURE — 84681 ASSAY OF C-PEPTIDE: CPT

## 2022-03-14 PROCEDURE — 80048 BASIC METABOLIC PNL TOTAL CA: CPT

## 2022-03-14 PROCEDURE — 36415 COLL VENOUS BLD VENIPUNCTURE: CPT

## 2022-03-15 LAB — C PEPTIDE SERPL-MCNC: <0.1 NG/ML (ref 0.9–6.9)

## 2022-03-23 ENCOUNTER — OFFICE VISIT (OUTPATIENT)
Dept: ENDOCRINOLOGY | Facility: CLINIC | Age: 67
End: 2022-03-23
Payer: COMMERCIAL

## 2022-03-23 VITALS — WEIGHT: 293 LBS | DIASTOLIC BLOOD PRESSURE: 98 MMHG | BODY MASS INDEX: 63.73 KG/M2 | SYSTOLIC BLOOD PRESSURE: 156 MMHG

## 2022-03-23 DIAGNOSIS — E66.01 MORBID OBESITY (H): ICD-10-CM

## 2022-03-23 DIAGNOSIS — E10.40 TYPE 1 DIABETES MELLITUS WITH DIABETIC NEUROPATHY (H): Primary | ICD-10-CM

## 2022-03-23 DIAGNOSIS — E55.9 VITAMIN D DEFICIENCY: ICD-10-CM

## 2022-03-23 PROBLEM — E11.29 TYPE 2 DIABETES MELLITUS WITH MICROALBUMINURIA, WITH LONG-TERM CURRENT USE OF INSULIN (H): Status: RESOLVED | Noted: 2021-10-28 | Resolved: 2022-03-23

## 2022-03-23 PROBLEM — R80.9 TYPE 2 DIABETES MELLITUS WITH MICROALBUMINURIA, WITH LONG-TERM CURRENT USE OF INSULIN (H): Status: RESOLVED | Noted: 2021-10-28 | Resolved: 2022-03-23

## 2022-03-23 PROBLEM — Z79.4 TYPE 2 DIABETES MELLITUS WITH MICROALBUMINURIA, WITH LONG-TERM CURRENT USE OF INSULIN (H): Status: RESOLVED | Noted: 2021-10-28 | Resolved: 2022-03-23

## 2022-03-23 PROCEDURE — 99213 OFFICE O/P EST LOW 20 MIN: CPT | Performed by: NURSE PRACTITIONER

## 2022-03-23 RX ORDER — CHOLECALCIFEROL (VITAMIN D3) 25 MCG
2 TABLET ORAL DAILY
COMMUNITY
Start: 2022-03-14 | End: 2022-05-16

## 2022-03-23 NOTE — PROGRESS NOTES
[facility] ENDOCRINOLOGY    Diabetes Note 3/24/2022    Corinna Farias, 1955, [id number]          Reason for visit      1. Type 1 diabetes mellitus with diabetic neuropathy (H)    2. Morbid obesity (H)    3. Vitamin D deficiency        HPI     Corinna Farias is a very pleasant 66 year old old female who presents for follow up.  SUMMARY:    Corinna is here today in follow-up for DM 1.  Her current A1c is 7.8 and down from her last at 8.2. Her Waldemar download shows that she was within range 55% of the time over the last two weeks. She did have some hypoglycemia, but nothing that required any outside assistance. She is taking Basaglar, 65 units in the morning and 45 unit in the evening. She is using her [place] every 2-3 hours and will use some correction if needed. She is taking prandial insulin dependent upon what she is eating.  She and her  eat their main meal mid day. She notes some neuropathy in her L foot only. It is nothing that keeps her up at night. She had an eye exam in January and no retinopathy was found at that time.    Pt reports today that she is ready to get some help with weight loss. Current BMI is 63.73.    She continues to take Vit D in supplementation daily. She takes between 2000 and 4000 international unit(s).    Noted today, pt is hypertensive. /98 and 180/100, respectively.     Blood glucose data:      Past Medical History     Patient Active Problem List   Diagnosis     Bariatric surgery status (GASTRIC BYPASS)     Vitamin D deficiency     Essential hypertension, benign     GERD (gastroesophageal reflux disease)     Hypothyroidism     Microalbuminuric diabetic nephropathy (H)     Morbid obesity (H)     GIOVANI (obstructive sleep apnea)     Chronic kidney disease, stage 3     Cervical cancer screening     Encounter for long-term (current) use of insulin (H)     Female climacteric state     Insomnia     Insulin pump status     Pseudophakia     Type 1 diabetes,  uncontrolled, with neuropathy (H)        Family History       family history includes Diabetes in her father and mother.    Social History      reports that she has never smoked. She has never used smokeless tobacco. She reports that she does not drink alcohol and does not use drugs.      Review of Systems     Patient has no polyuria or polydipsia, no chest pain, dyspnea or TIA's, no numbness, tingling or pain in extremities  Remainder negative except as noted in HPI.      Vital Signs     BP (!) 156/98 (BP Location: Right arm, Patient Position: Sitting, Cuff Size: Adult Large)   Wt (!) 173.7 kg (383 lb)   LMP  (LMP Unknown)   BMI 63.73 kg/m    Wt Readings from Last 3 Encounters:   03/23/22 (!) 173.7 kg (383 lb)   10/28/21 (!) 167.4 kg (369 lb)   05/04/21 (!) 162.8 kg (359 lb)       Physical Exam     Constitutional:  Well developed, Well nourished, morbidly obese  HENT:  Normocephalic,   Neck: Thyroid normal, No lymph nodes, Supple  Eyes:  PERRL, Conjunctiva pink  Respiratory:  Normal breath sounds, No respiratory distress  Cardiovascular:  Normal heart rate, Normal rhythm, No murmurs  GI:  Bowel sounds normal, Soft, No tenderness  Musculoskeletal:  No gross deformity or lesions, normal dorsalis pedis pulses  Skin: No acanthosis nigricans, lipoatrophy or lipodystrophy  Neurologic:  Alert & oriented x 3, nonfocal  Psychiatric:  Affect, Mood, Insight appropriate          Assessment     1. Type 1 diabetes mellitus with diabetic neuropathy (H)    2. Morbid obesity (H)    3. Vitamin D deficiency        Plan       Pt will continue with her current medication regimen for her DM 1.     Referral placed to Bariatric Medicine.    Instructed pt to contact her PCP office for f/u with BP elevation.    F/u with me in 6 months.         Rossy Baig NP  [facility]  3/24/2022  7:10 AM          Lab Results     Microalbumin Urine mg/dL   Date Value Ref Range Status   10/15/2020 6.40 (H) 0.00 - 1.99 mg/dL Final        Cholesterol   Date Value Ref Range Status   10/28/2021 142 <=199 mg/dL Final     Direct Measure HDL   Date Value Ref Range Status   10/28/2021 53 >=50 mg/dL Final     Comment:     HDL Cholesterol Reference Range:     0-2 years:   No reference ranges established for patients under 2 years old  at [facility] for lipid analytes.    2-8 years:  Greater than 45 mg/dL     18 years and older:   Female: Greater than or equal to 50 mg/dL   Male:   Greater than or equal to 40 mg/dL     Triglycerides   Date Value Ref Range Status   10/28/2021 89 <=149 mg/dL Final             Current Medications     Outpatient Medications Prior to Visit   Medication Sig Dispense Refill     ASPIRIN PO Take 81 mg by mouth       atorvastatin (LIPITOR) 20 MG tablet TAKE 1 TABLET(20 MG) BY MOUTH DAILY 90 tablet 2     ATORVASTATIN CALCIUM PO Take 20 mg by mouth daily       Continuous Blood Gluc Sensor (Easy EyeSTYLE AUSTIN 14 DAY SENSOR) MISC USE EVERY 14 DAYS AS DIRECTED 6 each 1     insulin glargine (BASAGLAR KWIKPEN) 100 UNIT/ML pen INJECT 60 UNITS UNDER THE SKIN IN THE AM AND 40 UNITS IN THE EVENING 30 mL 0     levothyroxine (SYNTHROID/LEVOTHROID) 75 MCG tablet TAKE ONE TABLET AT LEAST ONE HOUR BEFORE OR TWO HOURS AFTER A MEAL 90 tablet 2     lisinopril (ZESTRIL) 20 MG tablet Take 1 tablet (20 mg) by mouth daily 90 tablet 2     multivitamin (ONE-DAILY) tablet Take 1 tablet by mouth daily 90 tablet 3     NOVOLOG FLEXPEN 100 UNIT/ML soln ADMINISTER UP  UNITS UNDER THE SKIN DAILY PER SLIDING SCALE AS DIRECTED 30 mL 0     pantoprazole (PROTONIX) 40 MG EC tablet TAKE 1 TABLET(40 MG) BY MOUTH DAILY 90 tablet 1     traZODone (DESYREL) 100 MG tablet TAKE ONE TABLET AT BEDTIME AS NEEDED FOR SLEEP 90 tablet 1     VITAMIN D3 25 MCG (1000 UT) tablet 2 tablets daily        VITAMIN D, CHOLECALCIFEROL, PO Take 50,000 Units by mouth once a week       No facility-administered medications prior to visit.             [place]:              Answers  for HPI/ROS submitted by the patient on 3/22/2022  General Symptoms: No  Skin Symptoms: No  HENT Symptoms: No  EYE SYMPTOMS: No  HEART SYMPTOMS: No  LUNG SYMPTOMS: No  INTESTINAL SYMPTOMS: No  URINARY SYMPTOMS: No  GYNECOLOGIC SYMPTOMS: No  BREAST SYMPTOMS: No  SKELETAL SYMPTOMS: No  BLOOD SYMPTOMS: No  NERVOUS SYSTEM SYMPTOMS: No  MENTAL HEALTH SYMPTOMS: No

## 2022-03-23 NOTE — LETTER
3/23/2022         RE: Corinna Farias  [address]  [place] [address]        Dear Colleague,    Thank you for referring your patient, Corinna Farias, to the [facility]. Please see a copy of my visit note below.    [facility] ENDOCRINOLOGY    Diabetes Note 3/24/2022    Corinna Farias, 1955, [id number]          Reason for visit      1. Type 1 diabetes mellitus with diabetic neuropathy (H)    2. Morbid obesity (H)    3. Vitamin D deficiency        HPI     Corinna Farias is a very pleasant 66 year old old female who presents for follow up.  SUMMARY:    Corinna is here today in follow-up for DM 1.  Her current A1c is 7.8 and down from her last at 8.2. Her Waldemar download shows that she was within range 55% of the time over the last two weeks. She did have some hypoglycemia, but nothing that required any outside assistance. She is taking Basaglar, 65 units in the morning and 45 unit in the evening. She is using her [place] every 2-3 hours and will use some correction if needed. She is taking prandial insulin dependent upon what she is eating.  She and her  eat their main meal mid day. She notes some neuropathy in her L foot only. It is nothing that keeps her up at night. She had an eye exam in January and no retinopathy was found at that time.    Pt reports today that she is ready to get some help with weight loss. Current BMI is 63.73.    She continues to take Vit D in supplementation daily. She takes between 2000 and 4000 international unit(s).    Noted today, pt is hypertensive. /98 and 180/100, respectively.     Blood glucose data:      Past Medical History     Patient Active Problem List   Diagnosis     Bariatric surgery status (GASTRIC BYPASS)     Vitamin D deficiency     Essential hypertension, benign     GERD (gastroesophageal reflux disease)     Hypothyroidism     Microalbuminuric diabetic nephropathy (H)     Morbid obesity (H)      GIOVANI (obstructive sleep apnea)     Chronic kidney disease, stage 3     Cervical cancer screening     Encounter for long-term (current) use of insulin (H)     Female climacteric state     Insomnia     Insulin pump status     Pseudophakia     Type 1 diabetes, uncontrolled, with neuropathy (H)        Family History       family history includes Diabetes in her father and mother.    Social History      reports that she has never smoked. She has never used smokeless tobacco. She reports that she does not drink alcohol and does not use drugs.      Review of Systems     Patient has no polyuria or polydipsia, no chest pain, dyspnea or TIA's, no numbness, tingling or pain in extremities  Remainder negative except as noted in HPI.      Vital Signs     BP (!) 156/98 (BP Location: Right arm, Patient Position: Sitting, Cuff Size: Adult Large)   Wt (!) 173.7 kg (383 lb)   LMP  (LMP Unknown)   BMI 63.73 kg/m    Wt Readings from Last 3 Encounters:   03/23/22 (!) 173.7 kg (383 lb)   10/28/21 (!) 167.4 kg (369 lb)   05/04/21 (!) 162.8 kg (359 lb)       Physical Exam     Constitutional:  Well developed, Well nourished, morbidly obese  HENT:  Normocephalic,   Neck: Thyroid normal, No lymph nodes, Supple  Eyes:  PERRL, Conjunctiva pink  Respiratory:  Normal breath sounds, No respiratory distress  Cardiovascular:  Normal heart rate, Normal rhythm, No murmurs  GI:  Bowel sounds normal, Soft, No tenderness  Musculoskeletal:  No gross deformity or lesions, normal dorsalis pedis pulses  Skin: No acanthosis nigricans, lipoatrophy or lipodystrophy  Neurologic:  Alert & oriented x 3, nonfocal  Psychiatric:  Affect, Mood, Insight appropriate          Assessment     1. Type 1 diabetes mellitus with diabetic neuropathy (H)    2. Morbid obesity (H)    3. Vitamin D deficiency        Plan       Pt will continue with her current medication regimen for her DM 1.     Referral placed to Bariatric Medicine.    Instructed pt to contact her PCP office for  f/u with BP elevation.    F/u with me in 6 months.         Rossy Baig NP   Endocrinology  3/24/2022  7:10 AM          Lab Results     Microalbumin Urine mg/dL   Date Value Ref Range Status   10/15/2020 6.40 (H) 0.00 - 1.99 mg/dL Final       Cholesterol   Date Value Ref Range Status   10/28/2021 142 <=199 mg/dL Final     Direct Measure HDL   Date Value Ref Range Status   10/28/2021 53 >=50 mg/dL Final     Comment:     HDL Cholesterol Reference Range:     0-2 years:   No reference ranges established for patients under 2 years old  [facility].    2-8 years:  Greater than 45 mg/dL     18 years and older:   Female: Greater than or equal to 50 mg/dL   Male:   Greater than or equal to 40 mg/dL     Triglycerides   Date Value Ref Range Status   10/28/2021 89 <=149 mg/dL Final             Current Medications     Outpatient Medications Prior to Visit   Medication Sig Dispense Refill     ASPIRIN PO Take 81 mg by mouth       atorvastatin (LIPITOR) 20 MG tablet TAKE 1 TABLET(20 MG) BY MOUTH DAILY 90 tablet 2     ATORVASTATIN CALCIUM PO Take 20 mg by mouth daily       Continuous Blood Gluc Sensor (SpongeYLE AUSTIN 14 DAY SENSOR) MISC USE EVERY 14 DAYS AS DIRECTED 6 each 1     insulin glargine (BASAGLAR KWIKPEN) 100 UNIT/ML pen INJECT 60 UNITS UNDER THE SKIN IN THE AM AND 40 UNITS IN THE EVENING 30 mL 0     levothyroxine (SYNTHROID/LEVOTHROID) 75 MCG tablet TAKE ONE TABLET AT LEAST ONE HOUR BEFORE OR TWO HOURS AFTER A MEAL 90 tablet 2     lisinopril (ZESTRIL) 20 MG tablet Take 1 tablet (20 mg) by mouth daily 90 tablet 2     multivitamin (ONE-DAILY) tablet Take 1 tablet by mouth daily 90 tablet 3     NOVOLOG FLEXPEN 100 UNIT/ML soln ADMINISTER UP  UNITS UNDER THE SKIN DAILY PER SLIDING SCALE AS DIRECTED 30 mL 0     pantoprazole (PROTONIX) 40 MG EC tablet TAKE 1 TABLET(40 MG) BY MOUTH DAILY 90 tablet 1     traZODone (DESYREL) 100 MG tablet TAKE ONE TABLET AT BEDTIME AS NEEDED FOR SLEEP 90  tablet 1     VITAMIN D3 25 MCG (1000 UT) tablet 2 tablets daily        VITAMIN D, CHOLECALCIFEROL, PO Take 50,000 Units by mouth once a week       No facility-administered medications prior to visit.             [place]:              Answers for HPI/ROS submitted by the patient on 3/22/2022  General Symptoms: No  Skin Symptoms: No  HENT Symptoms: No  EYE SYMPTOMS: No  HEART SYMPTOMS: No  LUNG SYMPTOMS: No  INTESTINAL SYMPTOMS: No  URINARY SYMPTOMS: No  GYNECOLOGIC SYMPTOMS: No  BREAST SYMPTOMS: No  SKELETAL SYMPTOMS: No  BLOOD SYMPTOMS: No  NERVOUS SYSTEM SYMPTOMS: No  MENTAL HEALTH SYMPTOMS: No          Again, thank you for allowing me to participate in the care of your patient.        Sincerely,        Rossy Baig NP

## 2022-04-01 DIAGNOSIS — E10.9 TYPE 1 DIABETES MELLITUS WITHOUT COMPLICATION (H): ICD-10-CM

## 2022-04-01 RX ORDER — INSULIN GLARGINE 100 [IU]/ML
INJECTION, SOLUTION SUBCUTANEOUS
Qty: 100 ML | Refills: 1 | Status: SHIPPED | OUTPATIENT
Start: 2022-04-01 | End: 2023-01-30

## 2022-04-07 DIAGNOSIS — K21.9 GASTROESOPHAGEAL REFLUX DISEASE, UNSPECIFIED WHETHER ESOPHAGITIS PRESENT: ICD-10-CM

## 2022-04-07 DIAGNOSIS — E10.9 TYPE 1 DIABETES MELLITUS WITHOUT COMPLICATION (H): ICD-10-CM

## 2022-04-07 RX ORDER — INSULIN ASPART 100 [IU]/ML
INJECTION, SOLUTION INTRAVENOUS; SUBCUTANEOUS
Qty: 90 ML | Refills: 1 | Status: SHIPPED | OUTPATIENT
Start: 2022-04-07 | End: 2023-01-30

## 2022-04-08 RX ORDER — PANTOPRAZOLE SODIUM 40 MG/1
TABLET, DELAYED RELEASE ORAL
Qty: 90 TABLET | Refills: 1 | Status: SHIPPED | OUTPATIENT
Start: 2022-04-08 | End: 2022-10-23

## 2022-04-08 NOTE — TELEPHONE ENCOUNTER
"Last Written Prescription Date:  10/18/21  Last Fill Quantity: 90,  # refills: 1   Last office visit provider:  10/28/21     Requested Prescriptions   Pending Prescriptions Disp Refills     pantoprazole (PROTONIX) 40 MG EC tablet [Pharmacy Med Name: PANTOPRAZOLE 40MG TABLETS] 90 tablet 1     Sig: TAKE 1 TABLET(40 MG) BY MOUTH DAILY       PPI Protocol Passed - 4/7/2022 10:05 AM        Passed - Not on Clopidogrel (unless Pantoprazole ordered)        Passed - No diagnosis of osteoporosis on record        Passed - Recent (12 mo) or future (30 days) visit within the authorizing provider's specialty     Patient has had an office visit with the authorizing provider or a provider within the authorizing providers department within the previous 12 mos or has a future within next 30 days. See \"Patient Info\" tab in inbasket, or \"Choose Columns\" in Meds & Orders section of the refill encounter.              Passed - Medication is active on med list        Passed - Patient is age 18 or older        Passed - No active pregnacy on record        Passed - No positive pregnancy test in past 12 months             Janelle Vee RN 04/08/22 11:02 AM  "

## 2022-04-12 ENCOUNTER — ALLIED HEALTH/NURSE VISIT (OUTPATIENT)
Dept: FAMILY MEDICINE | Facility: CLINIC | Age: 67
End: 2022-04-12
Payer: COMMERCIAL

## 2022-04-12 VITALS — DIASTOLIC BLOOD PRESSURE: 80 MMHG | SYSTOLIC BLOOD PRESSURE: 136 MMHG

## 2022-04-12 DIAGNOSIS — I10 ESSENTIAL HYPERTENSION, BENIGN: ICD-10-CM

## 2022-04-12 DIAGNOSIS — Z23 NEED FOR COVID-19 VACCINE: Primary | ICD-10-CM

## 2022-04-12 PROCEDURE — 99207 PR NO CHARGE NURSE ONLY: CPT

## 2022-04-12 PROCEDURE — 0054A COVID-19,PF,PFIZER (12+ YRS): CPT

## 2022-04-12 PROCEDURE — 91305 COVID-19,PF,PFIZER (12+ YRS): CPT

## 2022-04-12 NOTE — PROGRESS NOTES
I met with Corinna Farias at the request of Rossy Baig   to recheck her blood pressure.  Blood pressure medications on the med list were reviewed with patient.    Patient has taken all medications as per usual regimen: Yes  Patient reports tolerating them without any issues or concerns: Yes    Vitals:    04/12/22 0813   BP: 136/80   BP Location: Left arm   Patient Position: Sitting   Cuff Size: Adult Large       Blood pressure was taken, previous encounter was reviewed, recorded blood pressure below 140/90.  Patient was discharged and the note will be sent to the provider for final review.     Celia PUGH LPN

## 2022-04-28 ENCOUNTER — OFFICE VISIT (OUTPATIENT)
Dept: INTERNAL MEDICINE | Facility: CLINIC | Age: 67
End: 2022-04-28
Payer: COMMERCIAL

## 2022-04-28 VITALS
OXYGEN SATURATION: 97 % | DIASTOLIC BLOOD PRESSURE: 78 MMHG | SYSTOLIC BLOOD PRESSURE: 138 MMHG | BODY MASS INDEX: 63.7 KG/M2 | HEART RATE: 112 BPM | WEIGHT: 293 LBS

## 2022-04-28 DIAGNOSIS — N18.31 STAGE 3A CHRONIC KIDNEY DISEASE (H): ICD-10-CM

## 2022-04-28 DIAGNOSIS — E66.01 MORBID OBESITY (H): ICD-10-CM

## 2022-04-28 DIAGNOSIS — R06.09 DOE (DYSPNEA ON EXERTION): Primary | ICD-10-CM

## 2022-04-28 DIAGNOSIS — Z12.4 CERVICAL CANCER SCREENING: ICD-10-CM

## 2022-04-28 DIAGNOSIS — D75.89 MACROCYTOSIS: ICD-10-CM

## 2022-04-28 DIAGNOSIS — R00.0 TACHYCARDIA: ICD-10-CM

## 2022-04-28 DIAGNOSIS — G47.33 OSA (OBSTRUCTIVE SLEEP APNEA): ICD-10-CM

## 2022-04-28 DIAGNOSIS — I10 ESSENTIAL HYPERTENSION, BENIGN: ICD-10-CM

## 2022-04-28 DIAGNOSIS — E10.65 TYPE 1 DIABETES MELLITUS WITH HYPERGLYCEMIA (H): ICD-10-CM

## 2022-04-28 PROBLEM — Z79.4 TYPE 2 DIABETES MELLITUS WITH COMPLICATION, WITH LONG-TERM CURRENT USE OF INSULIN (H): Status: ACTIVE | Noted: 2022-04-28

## 2022-04-28 PROBLEM — E11.8 TYPE 2 DIABETES MELLITUS WITH COMPLICATION, WITH LONG-TERM CURRENT USE OF INSULIN (H): Status: ACTIVE | Noted: 2022-04-28

## 2022-04-28 PROBLEM — J45.909 ASTHMA: Status: ACTIVE | Noted: 2022-04-28

## 2022-04-28 PROBLEM — E10.9 TYPE 1 DIABETES MELLITUS (H): Status: ACTIVE | Noted: 2022-04-28

## 2022-04-28 PROBLEM — Z79.4 TYPE 2 DIABETES MELLITUS WITH COMPLICATION, WITH LONG-TERM CURRENT USE OF INSULIN (H): Status: RESOLVED | Noted: 2022-04-28 | Resolved: 2022-04-28

## 2022-04-28 PROBLEM — E11.8 TYPE 2 DIABETES MELLITUS WITH COMPLICATION, WITH LONG-TERM CURRENT USE OF INSULIN (H): Status: RESOLVED | Noted: 2022-04-28 | Resolved: 2022-04-28

## 2022-04-28 LAB
ATRIAL RATE - MUSE: 100 BPM
DIASTOLIC BLOOD PRESSURE - MUSE: NORMAL MMHG
ERYTHROCYTE [DISTWIDTH] IN BLOOD BY AUTOMATED COUNT: 12 % (ref 10–15)
FOLATE SERPL-MCNC: 16 NG/ML
HCT VFR BLD AUTO: 45.7 % (ref 35–47)
HGB BLD-MCNC: 14.6 G/DL (ref 11.7–15.7)
INTERPRETATION ECG - MUSE: NORMAL
MCH RBC QN AUTO: 32.6 PG (ref 26.5–33)
MCHC RBC AUTO-ENTMCNC: 31.9 G/DL (ref 31.5–36.5)
MCV RBC AUTO: 102 FL (ref 78–100)
P AXIS - MUSE: 43 DEGREES
PLATELET # BLD AUTO: 285 10E3/UL (ref 150–450)
PR INTERVAL - MUSE: 144 MS
QRS DURATION - MUSE: 86 MS
QT - MUSE: 338 MS
QTC - MUSE: 436 MS
R AXIS - MUSE: 57 DEGREES
RBC # BLD AUTO: 4.48 10E6/UL (ref 3.8–5.2)
SYSTOLIC BLOOD PRESSURE - MUSE: NORMAL MMHG
T AXIS - MUSE: 27 DEGREES
VENTRICULAR RATE- MUSE: 100 BPM
VIT B12 SERPL-MCNC: 813 PG/ML (ref 213–816)
WBC # BLD AUTO: 8.3 10E3/UL (ref 4–11)

## 2022-04-28 PROCEDURE — 90471 IMMUNIZATION ADMIN: CPT | Performed by: INTERNAL MEDICINE

## 2022-04-28 PROCEDURE — 85027 COMPLETE CBC AUTOMATED: CPT | Performed by: INTERNAL MEDICINE

## 2022-04-28 PROCEDURE — 93005 ELECTROCARDIOGRAM TRACING: CPT | Performed by: INTERNAL MEDICINE

## 2022-04-28 PROCEDURE — 82746 ASSAY OF FOLIC ACID SERUM: CPT | Performed by: INTERNAL MEDICINE

## 2022-04-28 PROCEDURE — 36415 COLL VENOUS BLD VENIPUNCTURE: CPT | Performed by: INTERNAL MEDICINE

## 2022-04-28 PROCEDURE — 93010 ELECTROCARDIOGRAM REPORT: CPT | Performed by: INTERNAL MEDICINE

## 2022-04-28 PROCEDURE — 99214 OFFICE O/P EST MOD 30 MIN: CPT | Mod: 25 | Performed by: INTERNAL MEDICINE

## 2022-04-28 PROCEDURE — 82607 VITAMIN B-12: CPT | Performed by: INTERNAL MEDICINE

## 2022-04-28 PROCEDURE — 90715 TDAP VACCINE 7 YRS/> IM: CPT | Performed by: INTERNAL MEDICINE

## 2022-04-28 NOTE — LETTER
April 29, 2022      Corinna Farias  [address]   [place] [address]        Dear ,    Blood counts are stable.  B12 and folate are normal.         Resulted Orders   Vitamin B12   Result Value Ref Range    Vitamin B12 813 213 - 816 pg/mL   Folate   Result Value Ref Range    Folic Acid 16.0 >=3.5 ng/mL   CBC with platelets   Result Value Ref Range    WBC Count 8.3 4.0 - 11.0 10e3/uL    RBC Count 4.48 3.80 - 5.20 10e6/uL    Hemoglobin 14.6 11.7 - 15.7 g/dL    Hematocrit 45.7 35.0 - 47.0 %     (H) 78 - 100 fL    MCH 32.6 26.5 - 33.0 pg    MCHC 31.9 31.5 - 36.5 g/dL    RDW 12.0 10.0 - 15.0 %    Platelet Count 285 150 - 450 10e3/uL       If you have any questions or concerns, please call the clinic at the number listed above.       Sincerely,      Masood Melton MD

## 2022-04-28 NOTE — PROGRESS NOTES
Office Visit - Follow Up   Corinna Farias   66 year old female    Date of Visit: 4/28/2022    Chief Complaint   Patient presents with     Follow Up     Pt states her insurance does not cover the weight loss clinic referred.      Hypertension        Assessment and Plan   1. FERNANDEZ (dyspnea on exertion)  Likely due to her morbid obesity and deconditioning.  Given cardiac risk factors I do think that she needs to have some sort of risk stratification/evaluation of her dyspnea on exertion.  Given her obesity I am not sure the best way to go about that.  Perhaps a CT angiogram?  We will have her meet with cardiology to discuss  - Adult Cardiology Eval  Referral; Future  - EKG 12-lead, tracing only    2. Morbid obesity (H)  As above.  I wanted her to meet with the bariatric clinic but her insurance would not cover this.    3. GIOVANI (obstructive sleep apnea)  Untreated.  She refuses to have treatment.    4. Stage 3a chronic kidney disease (H)  Recent labs are stable.    5. Essential hypertension, benign  Blood pressure has been intermittently high.  Follow closely.  Today fair control    6. Tachycardia  Possibly due to deconditioning.  EKG with some nonspecific ST T wave changes.  Again referred to cardiology for discussion.  - Adult Cardiology Eval  Referral; Future  - EKG 12-lead, tracing only    7. Cervical cancer screening  I think she should have at least 1 more Pap smear.  Refer to GYN.  - Ob/Gyn Referral; Future    8. Macrocytosis  I note macrocytosis on her last blood counts.  Due to her history of gastric bypass surgery we will check B12 levels etc.  - Vitamin B12; Future  - Folate; Future  - CBC with platelets; Future    9. Type 1 diabetes mellitus with complication, with long-term current use of insulin (H)  Pump per endocrinology    The following high BMI interventions were performed this visit: weight monitoring    Return in about 4 weeks (around 5/26/2022) for with me, Follow up, using a  phone visit.     History of Present Illness   This 66 year old long-term diabetic.  C-peptide is undetectable.  Technically type I.  She has morbid obesity.  BMI of 64.  Here for follow-up.  Follows by endocrinology.  On a pump.  Heart rate has been high.  She notes dyspnea on exertion.  She is very immobile due to her obesity.  Has sleep apnea but severe likely but untreated.  She refuses to do that.  Long overdue for a Pap smear.  I referred her to the bariatric clinic for follow-up last visit but her insurance would not cover a visit there.  Mood has been stable.    Review of Systems: A comprehensive review of systems was negative except as noted.     Medications, Allergies and Problem List   Reviewed, reconciled and updated  Post Discharge Medication Reconciliation Status:   Social History     Social History Narrative    ** Merged History Encounter **          Physical Exam   General Appearance:   Pleasant morbidly obese woman.  Heart rate is in the 1 teens.  Regular.    /78 (BP Location: Other (Comment), Patient Position: Sitting, Cuff Size: Adult Large)   Pulse 112   Wt (!) 173.6 kg (382 lb 12.8 oz)   LMP  (LMP Unknown)   SpO2 97%   BMI 63.70 kg/m           Additional Information   Current Outpatient Medications   Medication Sig Dispense Refill     ASPIRIN PO Take 81 mg by mouth       atorvastatin (LIPITOR) 20 MG tablet TAKE 1 TABLET(20 MG) BY MOUTH DAILY 90 tablet 2     ATORVASTATIN CALCIUM PO Take 20 mg by mouth daily       Continuous Blood Gluc Sensor (FREESTYLE [place]USTIN 14 DAY SENSOR) [facility] USE EVERY 14 DAYS AS DIRECTED 6 each 1     insulin glargine (BASAGLAR KWIKPEN) 100 UNIT/ML pen INJECT 65 UNITS SUBCUTANEOUS EVERY MORNING AND 45 UNITS EVERY EVENING 100 mL 1     levothyroxine (SYNTHROID/LEVOTHROID) 75 MCG tablet TAKE ONE TABLET AT LEAST ONE HOUR BEFORE OR TWO HOURS AFTER A MEAL 90 tablet 2     lisinopril (ZESTRIL) 20 MG tablet Take 1 tablet (20 mg) by mouth daily 90 tablet 2     multivitamin  (ONE-DAILY) tablet Take 1 tablet by mouth daily 90 tablet 3     NOVOLOG FLEXPEN 100 UNIT/ML soln ADMINISTER UP  UNITS UNDER THE SKIN DAILY PER SLIDING SCALE AS DIRECTED 90 mL 1     pantoprazole (PROTONIX) 40 MG EC tablet TAKE 1 TABLET(40 MG) BY MOUTH DAILY 90 tablet 1     traZODone (DESYREL) 100 MG tablet TAKE ONE TABLET AT BEDTIME AS NEEDED FOR SLEEP 90 tablet 1     VITAMIN D3 25 MCG (1000 UT) tablet 2 tablets daily        No Known Allergies  Social History     Tobacco Use     Smoking status: Never Smoker     Smokeless tobacco: Never Used   Substance Use Topics     Alcohol use: No     Drug use: No       Review and/or order of clinical lab tests:  Review and/or order of radiology tests:  Review and/or order of medicine tests:  Discussion of test results with performing physician:  Decision to obtain old records and/or obtain history from someone other than the patient:  Review and summarization of old records and/or obtaining history from someone other than the patient and.or discussion of case with another health care provider:  Independent visualization of image, tracing or specimen itself:    Time:      MAGALI JEAN-BAPTISTE MD  Answers for HPI/ROS submitted by the patient on 4/25/2022  How many servings of fruits and vegetables do you eat daily?: 2-3  On average, how many sweetened beverages do you drink each day (Examples: soda, juice, sweet tea, etc.  Do NOT count diet or artificially sweetened beverages)?: 0  How many minutes a day do you exercise enough to make your heart beat faster?: 20 to 29  How many days a week do you exercise enough to make your heart beat faster?: 3 or less  How many days per week do you miss taking your medication?: 0  What is the reason for your visit today?: Phyical

## 2022-05-04 DIAGNOSIS — G47.33 OSA (OBSTRUCTIVE SLEEP APNEA): ICD-10-CM

## 2022-05-06 ENCOUNTER — OFFICE VISIT (OUTPATIENT)
Dept: CARDIOLOGY | Facility: CLINIC | Age: 67
End: 2022-05-06
Attending: INTERNAL MEDICINE
Payer: COMMERCIAL

## 2022-05-06 VITALS
HEIGHT: 65 IN | DIASTOLIC BLOOD PRESSURE: 72 MMHG | SYSTOLIC BLOOD PRESSURE: 132 MMHG | BODY MASS INDEX: 48.82 KG/M2 | WEIGHT: 293 LBS | HEART RATE: 88 BPM

## 2022-05-06 DIAGNOSIS — I10 PRIMARY HYPERTENSION: Primary | ICD-10-CM

## 2022-05-06 DIAGNOSIS — E66.811 CLASS 1 OBESITY IN ADULT, UNSPECIFIED BMI, UNSPECIFIED OBESITY TYPE, UNSPECIFIED WHETHER SERIOUS COMORBIDITY PRESENT: ICD-10-CM

## 2022-05-06 DIAGNOSIS — R00.0 TACHYCARDIA: ICD-10-CM

## 2022-05-06 DIAGNOSIS — R06.09 DOE (DYSPNEA ON EXERTION): ICD-10-CM

## 2022-05-06 DIAGNOSIS — E78.00 HYPERCHOLESTEREMIA: ICD-10-CM

## 2022-05-06 PROCEDURE — 99203 OFFICE O/P NEW LOW 30 MIN: CPT | Performed by: INTERNAL MEDICINE

## 2022-05-06 NOTE — PROGRESS NOTES
Corinna Farias,  1955, MRN [id number]    PCP: Masood Melton, [phone]    Assessment: Hypertension.  Hyperlipidemia LDL 71   Dyspnea on exertion  Obesity  Chronic kidney disease  Obstructive sleep apnea  Insulin-dependent diabetes mellitus    Recommendations: It is always difficult to know in the setting of dyspnea on exertion this represents an ischemic equivalent and she does have a number of risk factors.  Therefore I would suggest that we arrange for stress nuclear study to assess for ischemia and also cardiac echo to assess for structural heart disease as a cause of her tachycardia.  I do suspect that deconditioning is the most likely factor provoking her exertional tachycardia and dyspnea.  For now continue with all current medical therapy until studies have been reviewed.  If significant abnormalities are seen likely proceed with invasive testing    Chief Complaint: Dyspnea on exertion tachycardia    HPI:  We have been requested by Dr Melton to evaluate Corinna Farias for consultation who is a  66 year old year old female for above chief complaint.    Hx: Patient has had a history of progressive dyspnea on exertion with heart racing over the past year.  This happens with low or moderate levels of activity.  She denies any chest pain pressure tightness or heaviness.  No dizziness lightheadedness palpitations.  No syncopal events.  She has had chronic obesity.  She had difficulty controlling weight.  Has been considered for bariatric therapy however this was declined by insurance.  No previous heart history no family heart history.  Specifically denies chest heaviness pressure or typical anginal pain symptoms        Current Outpatient Medications:      ASPIRIN PO, Take 81 mg by mouth, Disp: , Rfl:      atorvastatin (LIPITOR) 20 MG tablet, TAKE 1 TABLET(20 MG) BY MOUTH DAILY, Disp: 90 tablet, Rfl: 2     ATORVASTATIN CALCIUM PO, Take 20 mg by mouth daily, Disp: , Rfl:      Continuous  Blood Gluc Sensor (FREESTYLE AUSTIN 14 DAY SENSOR) [facility], USE EVERY 14 DAYS AS DIRECTED, Disp: 6 each, Rfl: 1     insulin glargine (BASAGLAR KWIKPEN) 100 UNIT/ML pen, INJECT 65 UNITS SUBCUTANEOUS EVERY MORNING AND 45 UNITS EVERY EVENING, Disp: 100 mL, Rfl: 1     levothyroxine (SYNTHROID/LEVOTHROID) 75 MCG tablet, TAKE ONE TABLET AT LEAST ONE HOUR BEFORE OR TWO HOURS AFTER A MEAL, Disp: 90 tablet, Rfl: 2     lisinopril (ZESTRIL) 20 MG tablet, Take 1 tablet (20 mg) by mouth daily, Disp: 90 tablet, Rfl: 2     multivitamin (ONE-DAILY) tablet, Take 1 tablet by mouth daily, Disp: 90 tablet, Rfl: 3     NOVOLOG FLEXPEN 100 UNIT/ML soln, ADMINISTER UP  UNITS UNDER THE SKIN DAILY PER SLIDING SCALE AS DIRECTED, Disp: 90 mL, Rfl: 1     pantoprazole (PROTONIX) 40 MG EC tablet, TAKE 1 TABLET(40 MG) BY MOUTH DAILY, Disp: 90 tablet, Rfl: 1     traZODone (DESYREL) 100 MG tablet, TAKE ONE TABLET AT BEDTIME AS NEEDED FOR SLEEP, Disp: 90 tablet, Rfl: 1     VITAMIN D3 25 MCG (1000 UT) tablet, 2 tablets daily , Disp: , Rfl:     Medical History  Past Medical History:   Diagnosis Date     Chronic back pain      Diabetes (H)     type I     Diabetes mellitus (H)      GERD (gastroesophageal reflux disease)      Hypercholesteremia      Hyperlipidemia      Hyperlipidemia      Hypertension      Hypertension      Hypothyroid      GIOVANI (obstructive sleep apnea)     not on CPAP     Vitamin D deficiency       Past Medical History:   Diagnosis Date     Chronic back pain      Diabetes (H)     type I     Diabetes mellitus (H)      GERD (gastroesophageal reflux disease)      Hypercholesteremia      Hyperlipidemia      Hyperlipidemia      Hypertension      Hypertension      Hypothyroid      GIOVANI (obstructive sleep apnea)     not on CPAP     Vitamin D deficiency       PAST MEDICAL HISTORY:   Past Medical History:   Diagnosis Date     Chronic back pain      Diabetes (H)     type I     Diabetes mellitus (H)      GERD (gastroesophageal reflux disease)       Hypercholesteremia      Hyperlipidemia      Hyperlipidemia      Hypertension      Hypertension      Hypothyroid      GIOVANI (obstructive sleep apnea)     not on CPAP     Vitamin D deficiency        PAST SURGICAL HISTORY:   Past Surgical History:   Procedure Laterality Date     APPENDECTOMY      concomitant     CHOLECYSTECTOMY       CHOLECYSTECTOMY       ENDOSCOPIC INSERTION TUBE GASTROSTOMY N/A 5/12/2015    Procedure: ENDOSCOPIC INSERTION TUBE GASTROSTOMY;  Surgeon: Elieser Melton MD;  Location: [facility] OR     ESOPHAGOSCOPY, GASTROSCOPY, DUODENOSCOPY (EGD), COMBINED Left 11/12/2014    Procedure: COMBINED ESOPHAGOSCOPY, GASTROSCOPY, DUODENOSCOPY (EGD), BIOPSY SINGLE OR MULTIPLE;  Surgeon: Elieser Melton MD;  Location: [facility] GI     ESOPHAGOSCOPY, GASTROSCOPY, DUODENOSCOPY (EGD), COMBINED N/A 3/4/2015    Procedure: COMBINED ESOPHAGOSCOPY, GASTROSCOPY, DUODENOSCOPY (EGD);  Surgeon: Elieser Melton MD;  Location:  OR     ESOPHAGOSCOPY, GASTROSCOPY, DUODENOSCOPY (EGD), COMBINED N/A 5/12/2015    Procedure: COMBINED ESOPHAGOSCOPY, GASTROSCOPY, DUODENOSCOPY (EGD);  Surgeon: Elieser Melton MD;  Location: [facility] OR     GASTRIC BYPASS       GASTRIC RESTRICTION SURGERY       GASTROPLASTY VERTICAL BANDING N/A 3/23/87    ZAK; silastic ring; OR report confirmed.     OTHER SURGICAL HISTORY      gastric restriction takedown     [facility] COLONOSCOPY W/WO BRUSH/WASH N/A 5/4/2021    Procedure: COLONOSCOPY;  Surgeon: Iris Sebastian MD;  Location: [facility];  Service: Gastroenterology     [facility] UGI ENDOSCOPY W TRANSENDOSCOPIC STENT PLACEMENT N/A 2/4/2015    Procedure: COMBINED ESOPHAGOSCOPY, GASTROSCOPY, DUODENOSCOPY (EGD), PLACE TRANSENDOSCOPIC ESOPHAGEAL STENT;  Surgeon: Elieser Melton MD;  Location:  OR       FAMILY HISTORY:   Family History   Problem Relation Age of Onset     Diabetes Mother      Diabetes Father        SOCIAL HISTORY:   Social History     Tobacco Use     Smoking status: Never Smoker      Smokeless tobacco: Never Used   Substance Use Topics     Alcohol use: No      Surgical History  She  has a past surgical history that includes Gastroplasty vertical banding (N/A, 3/23/87); Cholecystectomy; appendectomy; Esophagoscopy, gastroscopy, duodenoscopy (EGD), combined (Left, 11/12/2014); UGI ENDOSCOPY W TRANSENDOSCOPIC STENT PLACEMENT (N/A, 2/4/2015); Esophagoscopy, gastroscopy, duodenoscopy (EGD), combined (N/A, 3/4/2015); Esophagoscopy, gastroscopy, duodenoscopy (EGD), combined (N/A, 5/12/2015); Endoscopic insertion tube gastrostomy (N/A, 5/12/2015); gastric bypass; Cholecystectomy; Gastric Restriction Surgery; other surgical history; and Colonoscopy w/wo Brush **Performed** (N/A, 5/4/2021).    Social History  Reviewed, and she  reports that she has never smoked. She has never used smokeless tobacco. She reports that she does not drink alcohol and does not use drugs.  Smoking status reviewed.  Social history othrwise not contributory to HPI.  Allergies  No Known Allergies    Family History  Reviewed, and family history includes Diabetes in her father and mother.  Extended Emergency Contact Information  Primary Emergency Contact: GANESH FARIAS  Address: [address] [place] [address] [facility]  Mobile Phone: [phone]  Relation: Significant other  Secondary Emergency Contact: Ganesh Farias  Address: [address] [place] [address]            [place] [address] [facility]  Home Phone: [phone]  Mobile Phone: [phone]  Relation: Spouse  Family history otherwise negative or not conributory to HPI.    Psychosocial Needs  Social History     Social History Narrative    ** Merged History Encounter **       Additional psychosocial needs reviewed per nursing assessment.    Prior to Admission Medications  (Not in a hospital admission)      Review of Systems:  Pertinent items are noted in HPI.  Review of systems is negative except for HPI  Physical Exam:    BP Readings from Last  "1 Encounters:   04/28/22 138/78     Pulse Readings from Last 1 Encounters:   04/28/22 112     Wt Readings from Last 1 Encounters:   04/28/22 (!) 173.6 kg (382 lb 12.8 oz)     Ht Readings from Last 1 Encounters:   10/28/21 1.651 m (5' 5\")     Estimated body mass index is 63.7 kg/m  as calculated from the following:    Height as of 10/28/21: 1.651 m (5' 5\").    Weight as of 4/28/22: 173.6 kg (382 lb 12.8 oz).    Head and neck without focal cranial neurologic defects.  JVD not distended.  Carotid upstroke normal without bruit.  Neck without cervical lymphadenopathy or thyromegaly.  Cardiac: S 1-2 RRR no S 3-4  Lungs: Clear in all fields Marked morbid obesity    No peripheral edema    Cholesterol   Date Value Ref Range Status   10/28/2021 142 <=199 mg/dL Final   10/15/2020 124 <=199 mg/dL Final     Direct Measure HDL   Date Value Ref Range Status   10/28/2021 53 >=50 mg/dL Final     Comment:     HDL Cholesterol Reference Range:     0-2 years:   No reference ranges established for patients under 2 years old  at [facility] for lipid analytes.    2-8 years:  Greater than 45 mg/dL     18 years and older:   Female: Greater than or equal to 50 mg/dL   Male:   Greater than or equal to 40 mg/dL   10/15/2020 48 (L) >=50 mg/dL Final     LDL Cholesterol Calculated   Date Value Ref Range Status   10/28/2021 71 <=129 mg/dL Final   10/15/2020 57 <=129 mg/dL Final     LDL Cholesterol Direct   Date Value Ref Range Status   01/17/2018 76 <=129 mg/dl Final     Triglycerides   Date Value Ref Range Status   10/28/2021 89 <=149 mg/dL Final   10/15/2020 97 <=149 mg/dL Final     No results found for: CHOLHDLRATIO Last Comprehensive Metabolic Panel:  Sodium   Date Value Ref Range Status   03/14/2022 138 136 - 145 mmol/L Final   02/09/2015 135 133 - 144 mmol/L Final     Potassium   Date Value Ref Range Status   03/14/2022 4.3 3.5 - 5.0 mmol/L Final   05/12/2015 4.0 3.4 - 5.3 mmol/L Final     Chloride   Date Value Ref Range Status "   03/14/2022 103 98 - 107 mmol/L Final   02/09/2015 101 94 - 109 mmol/L Final     Carbon Dioxide   Date Value Ref Range Status   02/09/2015 28 20 - 32 mmol/L Final     Carbon Dioxide (CO2)   Date Value Ref Range Status   03/14/2022 25 22 - 31 mmol/L Final     Anion Gap   Date Value Ref Range Status   03/14/2022 10 5 - 18 mmol/L Final   02/09/2015 7 3 - 14 mmol/L Final     Glucose   Date Value Ref Range Status   03/14/2022 131 (H) 70 - 125 mg/dL Final   02/09/2015 303 (H) 70 - 99 mg/dL Final     Comment:     Effective 7/30/2014, the reference range for this assay has changed to reflect   new instrumentation/methodology.       Urea Nitrogen   Date Value Ref Range Status   03/14/2022 19 8 - 22 mg/dL Final   02/09/2015 7 7 - 30 mg/dL Final     Comment:     Effective 7/30/2014, the reference range for this assay has changed to reflect   new instrumentation/methodology.       Creatinine   Date Value Ref Range Status   03/14/2022 0.98 0.60 - 1.10 mg/dL Final   05/12/2015 0.69 0.52 - 1.04 mg/dL Final     GFR Estimate   Date Value Ref Range Status   03/14/2022 64 >60 mL/min/1.73m2 Final     Comment:     Effective December 21, 2021 eGFRcr in adults is calculated using the 2021 CKD-EPI creatinine equation which includes age and gender (Rosa et al., NE, DOI: 10.1056/RGSPuq0516907)   04/29/2021 57 (L) >60 mL/min/1.73m2 Final   05/12/2015 87 >60 mL/min/1.7m2 Final     Comment:     Non  GFR Calc     Calcium   Date Value Ref Range Status   03/14/2022 9.5 8.5 - 10.5 mg/dL Final   02/09/2015 9.5 8.5 - 10.1 mg/dL Final     Comment:     Effective 7/30/2014, the reference range for this assay has changed to reflect   new instrumentation/methodology.

## 2022-05-06 NOTE — LETTER
2022    MAGALI MELTON MD  [address] [facility] [address]    RE: Corinna Farias       Dear Colleague,     I had the pleasure of seeing Corinna Farias in the [facility].         Corinna Farias,  1955, MRN [id number]    PCP: Magali Melton, [phone]    Assessment: Hypertension.  Hyperlipidemia LDL 71   Dyspnea on exertion  Obesity  Chronic kidney disease  Obstructive sleep apnea  Insulin-dependent diabetes mellitus    Recommendations: It is always difficult to know in the setting of dyspnea on exertion this represents an ischemic equivalent and she does have a number of risk factors.  Therefore I would suggest that we arrange for stress nuclear study to assess for ischemia and also cardiac echo to assess for structural heart disease as a cause of her tachycardia.  I do suspect that deconditioning is the most likely factor provoking her exertional tachycardia and dyspnea.  For now continue with all current medical therapy until studies have been reviewed.  If significant abnormalities are seen likely proceed with invasive testing    Chief Complaint: Dyspnea on exertion tachycardia    HPI:  We have been requested by Dr Melton to evaluate Corinna Farias for consultation who is a  66 year old year old female for above chief complaint.    Hx: Patient has had a history of progressive dyspnea on exertion with heart racing over the past year.  This happens with low or moderate levels of activity.  She denies any chest pain pressure tightness or heaviness.  No dizziness lightheadedness palpitations.  No syncopal events.  She has had chronic obesity.  She had difficulty controlling weight.  Has been considered for bariatric therapy however this was declined by insurance.  No previous heart history no family heart history.  Specifically denies chest heaviness pressure or typical anginal pain symptoms        Current Outpatient Medications:      ASPIRIN PO, Take 81 mg by  mouth, Disp: , Rfl:      atorvastatin (LIPITOR) 20 MG tablet, TAKE 1 TABLET(20 MG) BY MOUTH DAILY, Disp: 90 tablet, Rfl: 2     ATORVASTATIN CALCIUM PO, Take 20 mg by mouth daily, Disp: , Rfl:      Continuous Blood Gluc Sensor (FREESTYLE AUSTIN 14 DAY SENSOR) [facility], USE EVERY 14 DAYS AS DIRECTED, Disp: 6 each, Rfl: 1     insulin glargine (BASAGLAR KWIKPEN) 100 UNIT/ML pen, INJECT 65 UNITS SUBCUTANEOUS EVERY MORNING AND 45 UNITS EVERY EVENING, Disp: 100 mL, Rfl: 1     levothyroxine (SYNTHROID/LEVOTHROID) 75 MCG tablet, TAKE ONE TABLET AT LEAST ONE HOUR BEFORE OR TWO HOURS AFTER A MEAL, Disp: 90 tablet, Rfl: 2     lisinopril (ZESTRIL) 20 MG tablet, Take 1 tablet (20 mg) by mouth daily, Disp: 90 tablet, Rfl: 2     multivitamin (ONE-DAILY) tablet, Take 1 tablet by mouth daily, Disp: 90 tablet, Rfl: 3     NOVOLOG FLEXPEN 100 UNIT/ML soln, ADMINISTER UP  UNITS UNDER THE SKIN DAILY PER SLIDING SCALE AS DIRECTED, Disp: 90 mL, Rfl: 1     pantoprazole (PROTONIX) 40 MG EC tablet, TAKE 1 TABLET(40 MG) BY MOUTH DAILY, Disp: 90 tablet, Rfl: 1     traZODone (DESYREL) 100 MG tablet, TAKE ONE TABLET AT BEDTIME AS NEEDED FOR SLEEP, Disp: 90 tablet, Rfl: 1     VITAMIN D3 25 MCG (1000 UT) tablet, 2 tablets daily , Disp: , Rfl:     Medical History  Past Medical History:   Diagnosis Date     Chronic back pain      Diabetes (H)     type I     Diabetes mellitus (H)      GERD (gastroesophageal reflux disease)      Hypercholesteremia      Hyperlipidemia      Hyperlipidemia      Hypertension      Hypertension      Hypothyroid      GIOVANI (obstructive sleep apnea)     not on CPAP     Vitamin D deficiency       Past Medical History:   Diagnosis Date     Chronic back pain      Diabetes (H)     type I     Diabetes mellitus (H)      GERD (gastroesophageal reflux disease)      Hypercholesteremia      Hyperlipidemia      Hyperlipidemia      Hypertension      Hypertension      Hypothyroid      GIOVANI (obstructive sleep apnea)     not on CPAP     Vitamin  D deficiency       PAST MEDICAL HISTORY:   Past Medical History:   Diagnosis Date     Chronic back pain      Diabetes (H)     type I     Diabetes mellitus (H)      GERD (gastroesophageal reflux disease)      Hypercholesteremia      Hyperlipidemia      Hyperlipidemia      Hypertension      Hypertension      Hypothyroid      GIOVANI (obstructive sleep apnea)     not on CPAP     Vitamin D deficiency        PAST SURGICAL HISTORY:   Past Surgical History:   Procedure Laterality Date     APPENDECTOMY      concomitant     CHOLECYSTECTOMY       CHOLECYSTECTOMY       ENDOSCOPIC INSERTION TUBE GASTROSTOMY N/A 5/12/2015    Procedure: ENDOSCOPIC INSERTION TUBE GASTROSTOMY;  Surgeon: Elieser Melton MD;  Location:  OR     ESOPHAGOSCOPY, GASTROSCOPY, DUODENOSCOPY (EGD), COMBINED Left 11/12/2014    Procedure: COMBINED ESOPHAGOSCOPY, GASTROSCOPY, DUODENOSCOPY (EGD), BIOPSY SINGLE OR MULTIPLE;  Surgeon: Elieser Melton MD;  Location:  GI     ESOPHAGOSCOPY, GASTROSCOPY, DUODENOSCOPY (EGD), COMBINED N/A 3/4/2015    Procedure: COMBINED ESOPHAGOSCOPY, GASTROSCOPY, DUODENOSCOPY (EGD);  Surgeon: Elieser Melton MD;  Location:  OR     ESOPHAGOSCOPY, GASTROSCOPY, DUODENOSCOPY (EGD), COMBINED N/A 5/12/2015    Procedure: COMBINED ESOPHAGOSCOPY, GASTROSCOPY, DUODENOSCOPY (EGD);  Surgeon: Elieser Melton MD;  Location:  OR     GASTRIC BYPASS       GASTRIC RESTRICTION SURGERY       GASTROPLASTY VERTICAL BANDING N/A 3/23/87    ZAK; silastic ring; OR report confirmed.     OTHER SURGICAL HISTORY      gastric restriction takedown     [facility] COLONOSCOPY W/WO BRUSH/WASH N/A 5/4/2021    Procedure: COLONOSCOPY;  Surgeon: Iris Sebastian MD;  Location: [facility] OR;  Service: Gastroenterology     [facility] UGI ENDOSCOPY W TRANSENDOSCOPIC STENT PLACEMENT N/A 2/4/2015    Procedure: COMBINED ESOPHAGOSCOPY, GASTROSCOPY, DUODENOSCOPY (EGD), PLACE TRANSENDOSCOPIC ESOPHAGEAL STENT;  Surgeon: Elieser Melton MD;  [facility] OR       FAMILY HISTORY:   Family History   Problem Relation Age of Onset     Diabetes Mother      Diabetes Father        SOCIAL HISTORY:   Social History     Tobacco Use     Smoking status: Never Smoker     Smokeless tobacco: Never Used   Substance Use Topics     Alcohol use: No      Surgical History  She  has a past surgical history that includes Gastroplasty vertical banding (N/A, 3/23/87); Cholecystectomy; appendectomy; Esophagoscopy, gastroscopy, duodenoscopy (EGD), combined (Left, 11/12/2014); UGI ENDOSCOPY W TRANSENDOSCOPIC STENT PLACEMENT (N/A, 2/4/2015); Esophagoscopy, gastroscopy, duodenoscopy (EGD), combined (N/A, 3/4/2015); Esophagoscopy, gastroscopy, duodenoscopy (EGD), combined (N/A, 5/12/2015); Endoscopic insertion tube gastrostomy (N/A, 5/12/2015); gastric bypass; Cholecystectomy; Gastric Restriction Surgery; other surgical history; and Colonoscopy w/wo Brush **Performed** (N/A, 5/4/2021).    Social History  Reviewed, and she  reports that she has never smoked. She has never used smokeless tobacco. She reports that she does not drink alcohol and does not use drugs.  Smoking status reviewed.  Social history othrwise not contributory to HPI.  Allergies  No Known Allergies    Family History  Reviewed, and family history includes Diabetes in her father and mother.  Extended Emergency Contact Information  Primary Emergency Contact: GANESH FARIAS  Address: [address]             [place] [address] [place] [employer]  Mobile Phone: [phone]  Relation: Significant other  Secondary Emergency Contact: Ganesh Farias  Address: [address] [place] [address]            [place] [address] [facility]  Home Phone: [phone]  Mobile Phone: [phone]  Relation: Spouse  Family history otherwise negative or not conributory to HPI.    Psychosocial Needs  Social History     Social History Narrative    ** Merged History Encounter **       Additional psychosocial needs reviewed per nursing  "assessment.    Prior to Admission Medications  (Not in a hospital admission)      Review of Systems:  Pertinent items are noted in HPI.  Review of systems is negative except for HPI  Physical Exam:    BP Readings from Last 1 Encounters:   04/28/22 138/78     Pulse Readings from Last 1 Encounters:   04/28/22 112     Wt Readings from Last 1 Encounters:   04/28/22 (!) 173.6 kg (382 lb 12.8 oz)     Ht Readings from Last 1 Encounters:   10/28/21 1.651 m (5' 5\")     Estimated body mass index is 63.7 kg/m  as calculated from the following:    Height as of 10/28/21: 1.651 m (5' 5\").    Weight as of 4/28/22: 173.6 kg (382 lb 12.8 oz).    Head and neck without focal cranial neurologic defects.  JVD not distended.  Carotid upstroke normal without bruit.  Neck without cervical lymphadenopathy or thyromegaly.  Cardiac: S 1-2 RRR no S 3-4  Lungs: Clear in all fields Marked morbid obesity    No peripheral edema    Cholesterol   Date Value Ref Range Status   10/28/2021 142 <=199 mg/dL Final   10/15/2020 124 <=199 mg/dL Final     Direct Measure HDL   Date Value Ref Range Status   10/28/2021 53 >=50 mg/dL Final     Comment:     HDL Cholesterol Reference Range:     0-2 years:   No reference ranges established for patients under 2 years old  at [facility] for lipid analytes.    2-8 years:  Greater than 45 mg/dL     18 years and older:   Female: Greater than or equal to 50 mg/dL   Male:   Greater than or equal to 40 mg/dL   10/15/2020 48 (L) >=50 mg/dL Final     LDL Cholesterol Calculated   Date Value Ref Range Status   10/28/2021 71 <=129 mg/dL Final   10/15/2020 57 <=129 mg/dL Final     LDL Cholesterol Direct   Date Value Ref Range Status   01/17/2018 76 <=129 mg/dl Final     Triglycerides   Date Value Ref Range Status   10/28/2021 89 <=149 mg/dL Final   10/15/2020 97 <=149 mg/dL Final     No results found for: CHOLHDLRATIO Last Comprehensive Metabolic Panel:  Sodium   Date Value Ref Range Status   03/14/2022 138 136 - " 145 mmol/L Final   02/09/2015 135 133 - 144 mmol/L Final     Potassium   Date Value Ref Range Status   03/14/2022 4.3 3.5 - 5.0 mmol/L Final   05/12/2015 4.0 3.4 - 5.3 mmol/L Final     Chloride   Date Value Ref Range Status   03/14/2022 103 98 - 107 mmol/L Final   02/09/2015 101 94 - 109 mmol/L Final     Carbon Dioxide   Date Value Ref Range Status   02/09/2015 28 20 - 32 mmol/L Final     Carbon Dioxide (CO2)   Date Value Ref Range Status   03/14/2022 25 22 - 31 mmol/L Final     Anion Gap   Date Value Ref Range Status   03/14/2022 10 5 - 18 mmol/L Final   02/09/2015 7 3 - 14 mmol/L Final     Glucose   Date Value Ref Range Status   03/14/2022 131 (H) 70 - 125 mg/dL Final   02/09/2015 303 (H) 70 - 99 mg/dL Final     Comment:     Effective 7/30/2014, the reference range for this assay has changed to reflect   new instrumentation/methodology.       Urea Nitrogen   Date Value Ref Range Status   03/14/2022 19 8 - 22 mg/dL Final   02/09/2015 7 7 - 30 mg/dL Final     Comment:     Effective 7/30/2014, the reference range for this assay has changed to reflect   new instrumentation/methodology.       Creatinine   Date Value Ref Range Status   03/14/2022 0.98 0.60 - 1.10 mg/dL Final   05/12/2015 0.69 0.52 - 1.04 mg/dL Final     GFR Estimate   Date Value Ref Range Status   03/14/2022 64 >60 mL/min/1.73m2 Final     Comment:     Effective December 21, 2021 eGFRcr in adults is calculated using the 2021 CKD-EPI creatinine equation which includes age and gender (Rosa et al., NEJM, DOI: 10.1056/OLDDwq8607019)   04/29/2021 57 (L) >60 mL/min/1.73m2 Final   05/12/2015 87 >60 mL/min/1.7m2 Final     Comment:     Non  GFR Calc     Calcium   Date Value Ref Range Status   03/14/2022 9.5 8.5 - 10.5 mg/dL Final   02/09/2015 9.5 8.5 - 10.1 mg/dL Final     Comment:     Effective 7/30/2014, the reference range for this assay has changed to reflect   new instrumentation/methodology.              Thank you for allowing me to  participate in the care of your patient.      Sincerely,     Nuno Pompa MD     [facility]  cc:   Masood Melton MD  [address] [place] [address]

## 2022-05-07 DIAGNOSIS — G47.33 OSA (OBSTRUCTIVE SLEEP APNEA): ICD-10-CM

## 2022-05-07 RX ORDER — TRAZODONE HYDROCHLORIDE 100 MG/1
TABLET ORAL
Qty: 90 TABLET | Refills: 3 | Status: SHIPPED | OUTPATIENT
Start: 2022-05-07 | End: 2023-05-06

## 2022-05-07 NOTE — TELEPHONE ENCOUNTER
"Last Written Prescription Date:  10/28/21  Last Fill Quantity: 90,  # refills: 1   Last office visit provider:  4/28/22     Requested Prescriptions   Pending Prescriptions Disp Refills     traZODone (DESYREL) 100 MG tablet [Pharmacy Med Name: TRAZODONE 100MG TABLETS] 90 tablet 1     Sig: TAKE 1 TABLET BY MOUTH AT BEDTIME AS NEEDED FOR SLEEP       Serotonin Modulators Passed - 5/4/2022  1:51 AM        Passed - Recent (12 mo) or future (30 days) visit within the authorizing provider's specialty     Patient has had an office visit with the authorizing provider or a provider within the authorizing providers department within the previous 12 mos or has a future within next 30 days. See \"Patient Info\" tab in inbasket, or \"Choose Columns\" in Meds & Orders section of the refill encounter.              Passed - Medication is active on med list        Passed - Patient is age 18 or older        Passed - No active pregnancy on record        Passed - No positive pregnancy test in past 12 months             Jeimy Horan 05/07/22 5:09 PM    "

## 2022-05-10 RX ORDER — TRAZODONE HYDROCHLORIDE 100 MG/1
TABLET ORAL
Qty: 90 TABLET | Refills: 1 | OUTPATIENT
Start: 2022-05-10

## 2022-05-11 DIAGNOSIS — R80.9 TYPE 2 DIABETES MELLITUS WITH MICROALBUMINURIA, WITH LONG-TERM CURRENT USE OF INSULIN (H): ICD-10-CM

## 2022-05-11 DIAGNOSIS — Z79.4 TYPE 2 DIABETES MELLITUS WITH MICROALBUMINURIA, WITH LONG-TERM CURRENT USE OF INSULIN (H): ICD-10-CM

## 2022-05-11 DIAGNOSIS — E11.29 TYPE 2 DIABETES MELLITUS WITH MICROALBUMINURIA, WITH LONG-TERM CURRENT USE OF INSULIN (H): ICD-10-CM

## 2022-05-11 RX ORDER — FLASH GLUCOSE SENSOR
KIT MISCELLANEOUS
Qty: 6 EACH | Refills: 1 | Status: SHIPPED | OUTPATIENT
Start: 2022-05-11 | End: 2022-09-28

## 2022-05-13 DIAGNOSIS — E55.9 VITAMIN D DEFICIENCY: Primary | ICD-10-CM

## 2022-05-15 NOTE — TELEPHONE ENCOUNTER
"Routing refill request to provider for review/approval because:  Provider please review- LHE epic does not match requested script. Please review      Disp Refills Start End VENECIA    cholecalciferol, vitamin D3, 50 mcg (2,000 unit) Tab 90 tablet 3 4/27/2021  No   Sig: TAKE 1 TABLET BY MOUTH DAILY   Sent to pharmacy as: cholecalciferol (vitamin D3) 50 mcg (2,000 unit) tablet   E-Prescribing Status: Receipt confirmed by pharmacy (4/27/2021  1:30 PM CDT)       cholecalciferol, vitamin D3, 50 mcg (2,000 unit) Tab [[id number]]    Electronically signed by: Maosod Melton MD on 04/27/21 1330 Status: Active   Ordering user: Masood Melton MD 04/27/21 1330 Authorized by: Masood Melton MD   Frequency:  04/27/21 - Until Discontinued Released by: Masood Melton MD 04/27/21 1330   Diagnoses  Vitamin D deficiency [E55.9]     Last office visit provider:  4/28/22     Requested Prescriptions   Pending Prescriptions Disp Refills     VITAMIN D3 25 MCG (1000 UT) tablet [Pharmacy Med Name: VITAMIN D3 1,000 UNIT TABLETS] 180 tablet      Sig: TAKE 2 TABLETS BY MOUTH EVERY DAY       Vitamin Supplements (Adult) Protocol Passed - 5/13/2022  7:04 AM        Passed - High dose Vitamin D not ordered        Passed - Recent (12 mo) or future (30 days) visit within the authorizing provider's specialty     Patient has had an office visit with the authorizing provider or a provider within the authorizing providers department within the previous 12 mos or has a future within next 30 days. See \"Patient Info\" tab in inbasket, or \"Choose Columns\" in Meds & Orders section of the refill encounter.              Passed - Medication is active on med list             Linh Hernandez RN 05/15/22 2:50 PM  "

## 2022-05-16 ENCOUNTER — TELEPHONE (OUTPATIENT)
Dept: CARDIOLOGY | Facility: CLINIC | Age: 67
End: 2022-05-16

## 2022-05-16 RX ORDER — CHOLECALCIFEROL (VITAMIN D3) 25 MCG
TABLET ORAL
Qty: 180 TABLET | Refills: 3 | Status: SHIPPED | OUTPATIENT
Start: 2022-05-16 | End: 2023-07-10

## 2022-05-17 ENCOUNTER — HOSPITAL ENCOUNTER (OUTPATIENT)
Dept: CARDIOLOGY | Facility: HOSPITAL | Age: 67
Discharge: HOME | End: 2022-05-17
Attending: INTERNAL MEDICINE
Payer: COMMERCIAL

## 2022-05-17 ENCOUNTER — HOSPITAL ENCOUNTER (OUTPATIENT)
Dept: NUCLEAR MEDICINE | Facility: HOSPITAL | Age: 67
Discharge: HOME | End: 2022-05-17
Attending: INTERNAL MEDICINE
Payer: COMMERCIAL

## 2022-05-17 DIAGNOSIS — R06.09 DOE (DYSPNEA ON EXERTION): ICD-10-CM

## 2022-05-17 PROCEDURE — 93017 CV STRESS TEST TRACING ONLY: CPT

## 2022-05-17 PROCEDURE — 78452 HT MUSCLE IMAGE SPECT MULT: CPT | Mod: 26 | Performed by: INTERNAL MEDICINE

## 2022-05-17 PROCEDURE — 93016 CV STRESS TEST SUPVJ ONLY: CPT | Performed by: INTERNAL MEDICINE

## 2022-05-17 PROCEDURE — A9500 TC99M SESTAMIBI: HCPCS | Performed by: INTERNAL MEDICINE

## 2022-05-17 PROCEDURE — 250N000011 HC RX IP 250 OP 636: Performed by: INTERNAL MEDICINE

## 2022-05-17 PROCEDURE — 343N000001 HC RX 343: Performed by: INTERNAL MEDICINE

## 2022-05-17 PROCEDURE — 93018 CV STRESS TEST I&R ONLY: CPT | Performed by: INTERNAL MEDICINE

## 2022-05-17 RX ORDER — AMINOPHYLLINE 25 MG/ML
50 INJECTION, SOLUTION INTRAVENOUS
Status: DISCONTINUED | OUTPATIENT
Start: 2022-05-17 | End: 2022-05-17 | Stop reason: HOSPADM

## 2022-05-17 RX ORDER — ALBUTEROL SULFATE 0.83 MG/ML
2.5 SOLUTION RESPIRATORY (INHALATION)
Status: DISCONTINUED | OUTPATIENT
Start: 2022-05-17 | End: 2022-05-17 | Stop reason: HOSPADM

## 2022-05-17 RX ORDER — CAFFEINE 200 MG
200 TABLET ORAL
Status: DISCONTINUED | OUTPATIENT
Start: 2022-05-17 | End: 2022-05-17 | Stop reason: HOSPADM

## 2022-05-17 RX ORDER — REGADENOSON 0.08 MG/ML
0.4 INJECTION, SOLUTION INTRAVENOUS ONCE
Status: COMPLETED | OUTPATIENT
Start: 2022-05-17 | End: 2022-05-17

## 2022-05-17 RX ORDER — CAFFEINE CITRATE 20 MG/ML
60 SOLUTION INTRAVENOUS
Status: DISCONTINUED | OUTPATIENT
Start: 2022-05-17 | End: 2022-05-17 | Stop reason: HOSPADM

## 2022-05-17 RX ADMIN — REGADENOSON 0.4 MG: 0.08 INJECTION, SOLUTION INTRAVENOUS at 12:59

## 2022-05-17 RX ADMIN — Medication 41.8 MCI.: at 14:47

## 2022-05-18 ENCOUNTER — HOSPITAL ENCOUNTER (OUTPATIENT)
Dept: NUCLEAR MEDICINE | Facility: HOSPITAL | Age: 67
Discharge: HOME | End: 2022-05-18
Attending: INTERNAL MEDICINE
Payer: COMMERCIAL

## 2022-05-18 LAB
CV STRESS CURRENT BP HE: NORMAL
CV STRESS CURRENT HR HE: 101
CV STRESS CURRENT HR HE: 103
CV STRESS CURRENT HR HE: 104
CV STRESS CURRENT HR HE: 107
CV STRESS CURRENT HR HE: 110
CV STRESS CURRENT HR HE: 110
CV STRESS CURRENT HR HE: 118
CV STRESS CURRENT HR HE: 119
CV STRESS CURRENT HR HE: 90
CV STRESS CURRENT HR HE: 90
CV STRESS CURRENT HR HE: 92
CV STRESS CURRENT HR HE: 97
CV STRESS CURRENT HR HE: 98
CV STRESS CURRENT HR HE: 98
CV STRESS DEVIATION TIME HE: NORMAL
CV STRESS ECHO PERCENT HR HE: NORMAL
CV STRESS EXERCISE STAGE HE: NORMAL
CV STRESS FINAL RESTING BP HE: NORMAL
CV STRESS FINAL RESTING HR HE: 98
CV STRESS MAX HR HE: 119
CV STRESS MAX TREADMILL GRADE HE: 0
CV STRESS MAX TREADMILL SPEED HE: 0
CV STRESS PEAK DIA BP HE: NORMAL
CV STRESS PEAK SYS BP HE: NORMAL
CV STRESS PHASE HE: NORMAL
CV STRESS PROTOCOL HE: NORMAL
CV STRESS RESTING PT POSITION HE: NORMAL
CV STRESS ST DEVIATION AMOUNT HE: NORMAL
CV STRESS ST DEVIATION ELEVATION HE: NORMAL
CV STRESS ST EVELATION AMOUNT HE: NORMAL
CV STRESS TEST TYPE HE: NORMAL
CV STRESS TOTAL STAGE TIME MIN 1 HE: NORMAL
RATE PRESSURE PRODUCT: NORMAL
STRESS ECHO BASELINE DIASTOLIC HE: 72
STRESS ECHO BASELINE HR: 94
STRESS ECHO BASELINE SYSTOLIC BP: 149
STRESS ECHO CALCULATED PERCENT HR: 76 %
STRESS ECHO LAST STRESS DIASTOLIC BP: 85
STRESS ECHO LAST STRESS HR: 107
STRESS ECHO LAST STRESS SYSTOLIC BP: 159
STRESS ECHO TARGET HR: 156

## 2022-05-18 PROCEDURE — 343N000001 HC RX 343: Performed by: INTERNAL MEDICINE

## 2022-05-18 PROCEDURE — A9500 TC99M SESTAMIBI: HCPCS | Performed by: INTERNAL MEDICINE

## 2022-05-18 RX ADMIN — Medication 46.6 MILLICURIE: at 07:40

## 2022-05-31 ENCOUNTER — VIRTUAL VISIT (OUTPATIENT)
Dept: INTERNAL MEDICINE | Facility: CLINIC | Age: 67
End: 2022-05-31
Payer: COMMERCIAL

## 2022-05-31 DIAGNOSIS — E66.01 MORBID OBESITY (H): ICD-10-CM

## 2022-05-31 DIAGNOSIS — R06.09 DOE (DYSPNEA ON EXERTION): Primary | ICD-10-CM

## 2022-05-31 PROCEDURE — 99213 OFFICE O/P EST LOW 20 MIN: CPT | Mod: 95 | Performed by: INTERNAL MEDICINE

## 2022-05-31 NOTE — PROGRESS NOTES
Corinna is a 66 year old who is being evaluated via a billable telephone visit.      What phone number would you like to be contacted at? [phone]  How would you like to obtain your AVS? Keshia    Phone call duration: 7 minutes    Corinna joins via telephone visit.  She notes no change in her dyspnea on exertion symptoms.  She underwent stress testing which was essentially normal.  They scheduled her for echocardiogram but she tells me she is unable to afford this.  She already has several thousand dollars which she owes to [employer] and will not be able to afford an echocardiogram.  She tells me that she will be more able to get it done when she goes on Medicare in the fall    1. FERNANDEZ (dyspnea on exertion)  Likely multifactorial.  Stress test normal.  We discussed increasing her exercise as tolerated to improve her conditioning.  It would be nice to get an echo.  She likely has pulmonary hypertension from her untreated sleep apnea.  She is resistant to doing that now.  Its unfortunate.  I did discuss with her that if her dyspnea on exertion gets worse she needs to let us know immediately.  She understands and is agreement with that.    2. Morbid obesity (H)  As above.  I did urge increasing exercise tolerance is much as possible slowly.      Answers for HPI/ROS submitted by the patient on 5/27/2022  Nitroglycerin use:: never  Do you take an aspirin every day?: Yes  How many servings of fruits and vegetables do you eat daily?: 2-3  On average, how many sweetened beverages do you drink each day (Examples: soda, juice, sweet tea, etc.  Do NOT count diet or artificially sweetened beverages)?: 1  How many minutes a day do you exercise enough to make your heart beat faster?: 20 to 29  How many days a week do you exercise enough to make your heart beat faster?: 3 or less  How many days per week do you miss taking your medication?: 0

## 2022-07-01 ENCOUNTER — LAB (OUTPATIENT)
Dept: LAB | Facility: CLINIC | Age: 67
End: 2022-07-01
Payer: COMMERCIAL

## 2022-07-01 DIAGNOSIS — E10.40 TYPE 1 DIABETES MELLITUS WITH DIABETIC NEUROPATHY (H): ICD-10-CM

## 2022-07-01 DIAGNOSIS — E55.9 VITAMIN D DEFICIENCY: ICD-10-CM

## 2022-07-01 LAB
ANION GAP SERPL CALCULATED.3IONS-SCNC: 14 MMOL/L (ref 7–15)
BUN SERPL-MCNC: 18.6 MG/DL (ref 8–23)
CALCIUM SERPL-MCNC: 9.6 MG/DL (ref 8.8–10.2)
CHLORIDE SERPL-SCNC: 103 MMOL/L (ref 98–107)
CREAT SERPL-MCNC: 1.07 MG/DL (ref 0.51–0.95)
DEPRECATED HCO3 PLAS-SCNC: 22 MMOL/L (ref 22–29)
GFR SERPL CREATININE-BSD FRML MDRD: 58 ML/MIN/1.73M2
GLUCOSE SERPL-MCNC: 162 MG/DL (ref 70–99)
HBA1C MFR BLD: 7.2 % (ref 0–5.6)
POTASSIUM SERPL-SCNC: 4.2 MMOL/L (ref 3.4–5.3)
SODIUM SERPL-SCNC: 139 MMOL/L (ref 136–145)

## 2022-07-01 PROCEDURE — 36415 COLL VENOUS BLD VENIPUNCTURE: CPT

## 2022-07-01 PROCEDURE — 82306 VITAMIN D 25 HYDROXY: CPT

## 2022-07-01 PROCEDURE — 80048 BASIC METABOLIC PNL TOTAL CA: CPT

## 2022-07-01 PROCEDURE — 83036 HEMOGLOBIN GLYCOSYLATED A1C: CPT

## 2022-07-02 LAB — DEPRECATED CALCIDIOL+CALCIFEROL SERPL-MC: 39 UG/L (ref 20–75)

## 2022-07-12 ENCOUNTER — OFFICE VISIT (OUTPATIENT)
Dept: ENDOCRINOLOGY | Facility: CLINIC | Age: 67
End: 2022-07-12
Payer: COMMERCIAL

## 2022-07-12 VITALS
WEIGHT: 293 LBS | SYSTOLIC BLOOD PRESSURE: 138 MMHG | HEART RATE: 96 BPM | DIASTOLIC BLOOD PRESSURE: 72 MMHG | BODY MASS INDEX: 62.29 KG/M2

## 2022-07-12 DIAGNOSIS — E66.01 MORBID OBESITY (H): ICD-10-CM

## 2022-07-12 DIAGNOSIS — E10.69 TYPE 1 DIABETES MELLITUS WITH OTHER SPECIFIED COMPLICATION (H): Primary | ICD-10-CM

## 2022-07-12 DIAGNOSIS — N18.31 STAGE 3A CHRONIC KIDNEY DISEASE (H): ICD-10-CM

## 2022-07-12 PROCEDURE — 99213 OFFICE O/P EST LOW 20 MIN: CPT | Performed by: NURSE PRACTITIONER

## 2022-07-12 NOTE — LETTER
7/12/2022         RE: Corinna Farias  [address]  [place] [address]        Dear Colleague,    Thank you for referring your patient, Corinna Farias, to the [facility]. Please see a copy of my visit note below.    [facility] ENDOCRINOLOGY    Diabetes Note 7/12/2022    Corinna Farias, 1955, [id number]          Reason for visit      1. Type 1 diabetes mellitus with other specified complication (H)    2. Stage 3a chronic kidney disease (H)    3. Morbid obesity (H)        HPI     Corinna Farias is a very pleasant 66 year old old female who presents for follow up.  SUMMARY:    Corinna is here today in f/u for DM 1. Her current A1c is 7.2 and down from her previous at 7.8.  She reports that she is feeling well. She remains on Basaglar 65 units in the morning and 45 units in the evening. Her prandial insulin is dependent upon what she is eating. Current lab work shows an elevation in her Creatinine, which is new. Also a slight drop in her GFR. Pt encouraged to make sure that she is getting enough hydration during the day. Pt is currently on an ACE.     Pt is transitioning to Medicare this fall. She is still interested in meeting with Bariatrics for guidance.       Blood glucose data:  Unavailable    Past Medical History     Patient Active Problem List   Diagnosis     Bariatric surgery status (GASTRIC BYPASS)     Vitamin D deficiency     Essential hypertension, benign     GERD (gastroesophageal reflux disease)     Hypothyroidism     Microalbuminuric diabetic nephropathy (H)     Morbid obesity (H)     GIOVANI (obstructive sleep apnea)     Chronic kidney disease, stage 3     Cervical cancer screening     Encounter for long-term (current) use of insulin (H)     Female climacteric state     Insomnia     Insulin pump status     Pseudophakia     Type 1 diabetes mellitus (H)     Asthma        Family History       family history includes Diabetes in her father and  mother.    Social History      reports that she has never smoked. She has never used smokeless tobacco. She reports that she does not drink alcohol and does not use drugs.      Review of Systems     Patient has no polyuria or polydipsia, no chest pain, dyspnea or TIA's, no numbness, tingling or pain in extremities  Remainder negative except as noted in HPI.    Answers for HPI/ROS submitted by the patient on 7/7/2022  General Symptoms: No  Skin Symptoms: No  HENT Symptoms: No  EYE SYMPTOMS: No  HEART SYMPTOMS: No  LUNG SYMPTOMS: No  INTESTINAL SYMPTOMS: No  URINARY SYMPTOMS: No  GYNECOLOGIC SYMPTOMS: No  BREAST SYMPTOMS: No  SKELETAL SYMPTOMS: No  BLOOD SYMPTOMS: No  NERVOUS SYSTEM SYMPTOMS: No  MENTAL HEALTH SYMPTOMS: No          Vital Signs     /72 (BP Location: Right arm, Patient Position: Sitting, Cuff Size: Thigh)   Pulse 96   Wt (!) 169.8 kg (374 lb 4.8 oz)   LMP  (LMP Unknown)   BMI 62.29 kg/m    Wt Readings from Last 3 Encounters:   07/12/22 (!) 169.8 kg (374 lb 4.8 oz)   05/06/22 (!) 171.9 kg (379 lb)   04/28/22 (!) 173.6 kg (382 lb 12.8 oz)       Physical Exam     Constitutional:  Well developed, Well nourished, morbid obesity  HENT:  Normocephalic,   Neck: Thyroid normal, No lymph nodes, Supple  Eyes:  PERRL, Conjunctiva pink  Respiratory:  Normal breath sounds, No respiratory distress  Cardiovascular:  Normal heart rate, Normal rhythm, No murmurs  GI:  Bowel sounds normal, Soft, No tenderness  Musculoskeletal:  No gross deformity or lesions, normal dorsalis pedis pulses  Skin: No acanthosis nigricans, lipoatrophy or lipodystrophy  Neurologic:  Alert & oriented x 3, nonfocal  Psychiatric:  Affect, Mood, Insight appropriate  Diabetic foot exam: no ulcers, charcot's or high risk calluses,       Assessment     1. Type 1 diabetes mellitus with other specified complication (H)    2. Stage 3a chronic kidney disease (H)    3. Morbid obesity (H)        Plan     No change to her insulin regimen today as  her control has improved.     Will continue to watch Renal Function. Pt will remain on ACE    Pt will f/u with Bariatrics after Medicare transition.     F/u with me in 6 months.       Rossy Baig NP  [facility]  7/12/2022  8:24 AM          Lab Results     Microalbumin Urine mg/dL   Date Value Ref Range Status   10/15/2020 6.40 (H) 0.00 - 1.99 mg/dL Final       Cholesterol   Date Value Ref Range Status   10/28/2021 142 <=199 mg/dL Final     Direct Measure HDL   Date Value Ref Range Status   10/28/2021 53 >=50 mg/dL Final     Comment:     HDL Cholesterol Reference Range:     0-2 years:   No reference ranges established for patients under 2 years old  at [facility] for lipid analytes.    2-8 years:  Greater than 45 mg/dL     18 years and older:   Female: Greater than or equal to 50 mg/dL   Male:   Greater than or equal to 40 mg/dL     Triglycerides   Date Value Ref Range Status   10/28/2021 89 <=149 mg/dL Final             Current Medications     Outpatient Medications Prior to Visit   Medication Sig Dispense Refill     ASPIRIN PO Take 81 mg by mouth daily       atorvastatin (LIPITOR) 20 MG tablet TAKE 1 TABLET(20 MG) BY MOUTH DAILY 90 tablet 2     Continuous Blood Gluc Sensor (FREESTYLE AUSTIN 14 DAY SENSOR) MISC CHANGE SENSOR EVERY 14 DAYS AS DIRECTED 6 each 1     insulin glargine (BASAGLAR KWIKPEN) 100 UNIT/ML pen INJECT 65 UNITS SUBCUTANEOUS EVERY MORNING AND 45 UNITS EVERY EVENING 100 mL 1     levothyroxine (SYNTHROID/LEVOTHROID) 75 MCG tablet TAKE ONE TABLET AT LEAST ONE HOUR BEFORE OR TWO HOURS AFTER A MEAL 90 tablet 2     lisinopril (ZESTRIL) 20 MG tablet Take 1 tablet (20 mg) by mouth daily 90 tablet 2     multivitamin (ONE-DAILY) tablet Take 1 tablet by mouth daily 90 tablet 3     NOVOLOG FLEXPEN 100 UNIT/ML soln ADMINISTER UP  UNITS UNDER THE SKIN DAILY PER SLIDING SCALE AS DIRECTED 90 mL 1     pantoprazole (PROTONIX) 40 MG EC tablet TAKE 1 TABLET(40 MG) BY MOUTH DAILY 90 tablet 1      traZODone (DESYREL) 100 MG tablet TAKE 1 TABLET BY MOUTH AT BEDTIME AS NEEDED FOR SLEEP 90 tablet 3     VITAMIN D3 25 MCG (1000 UT) tablet TAKE 2 TABLETS BY MOUTH EVERY  tablet 3     No facility-administered medications prior to visit.                 Again, thank you for allowing me to participate in the care of your patient.        Sincerely,        Rossy Baig NP

## 2022-07-12 NOTE — PROGRESS NOTES
[facility] ENDOCRINOLOGY    Diabetes Note 7/12/2022    Corinna Farias, 1955, [id number]          Reason for visit      1. Type 1 diabetes mellitus with other specified complication (H)    2. Stage 3a chronic kidney disease (H)    3. Morbid obesity (H)        HPI     Corinna Farias is a very pleasant 66 year old old female who presents for follow up.  SUMMARY:    Corinna is here today in f/u for DM 1. Her current A1c is 7.2 and down from her previous at 7.8.  She reports that she is feeling well. She remains on Basaglar 65 units in the morning and 45 units in the evening. Her prandial insulin is dependent upon what she is eating. Current lab work shows an elevation in her Creatinine, which is new. Also a slight drop in her GFR. Pt encouraged to make sure that she is getting enough hydration during the day. Pt is currently on an ACE.     Pt is transitioning to Medicare this fall. She is still interested in meeting with Bariatrics for guidance.       Blood glucose data:  Unavailable    Past Medical History     Patient Active Problem List   Diagnosis     Bariatric surgery status (GASTRIC BYPASS)     Vitamin D deficiency     Essential hypertension, benign     GERD (gastroesophageal reflux disease)     Hypothyroidism     Microalbuminuric diabetic nephropathy (H)     Morbid obesity (H)     GIOVANI (obstructive sleep apnea)     Chronic kidney disease, stage 3     Cervical cancer screening     Encounter for long-term (current) use of insulin (H)     Female climacteric state     Insomnia     Insulin pump status     Pseudophakia     Type 1 diabetes mellitus (H)     Asthma        Family History       family history includes Diabetes in her father and mother.    Social History      reports that she has never smoked. She has never used smokeless tobacco. She reports that she does not drink alcohol and does not use drugs.      Review of Systems     Patient has no polyuria or polydipsia, no chest pain, dyspnea or TIA's, no  numbness, tingling or pain in extremities  Remainder negative except as noted in HPI.    Answers for HPI/ROS submitted by the patient on 7/7/2022  General Symptoms: No  Skin Symptoms: No  HENT Symptoms: No  EYE SYMPTOMS: No  HEART SYMPTOMS: No  LUNG SYMPTOMS: No  INTESTINAL SYMPTOMS: No  URINARY SYMPTOMS: No  GYNECOLOGIC SYMPTOMS: No  BREAST SYMPTOMS: No  SKELETAL SYMPTOMS: No  BLOOD SYMPTOMS: No  NERVOUS SYSTEM SYMPTOMS: No  MENTAL HEALTH SYMPTOMS: No          Vital Signs     /72 (BP Location: Right arm, Patient Position: Sitting, Cuff Size: Thigh)   Pulse 96   Wt (!) 169.8 kg (374 lb 4.8 oz)   LMP  (LMP Unknown)   BMI 62.29 kg/m    Wt Readings from Last 3 Encounters:   07/12/22 (!) 169.8 kg (374 lb 4.8 oz)   05/06/22 (!) 171.9 kg (379 lb)   04/28/22 (!) 173.6 kg (382 lb 12.8 oz)       Physical Exam     Constitutional:  Well developed, Well nourished, morbid obesity  HENT:  Normocephalic,   Neck: Thyroid normal, No lymph nodes, Supple  Eyes:  PERRL, Conjunctiva pink  Respiratory:  Normal breath sounds, No respiratory distress  Cardiovascular:  Normal heart rate, Normal rhythm, No murmurs  GI:  Bowel sounds normal, Soft, No tenderness  Musculoskeletal:  No gross deformity or lesions, normal dorsalis pedis pulses  Skin: No acanthosis nigricans, lipoatrophy or lipodystrophy  Neurologic:  Alert & oriented x 3, nonfocal  Psychiatric:  Affect, Mood, Insight appropriate  Diabetic foot exam: no ulcers, charcot's or high risk calluses,       Assessment     1. Type 1 diabetes mellitus with other specified complication (H)    2. Stage 3a chronic kidney disease (H)    3. Morbid obesity (H)        Plan     No change to her insulin regimen today as her control has improved.     Will continue to watch Renal Function. Pt will remain on ACE    Pt will f/u with Bariatrics after Medicare transition.     F/u with me in 6 months.       Rossy Baig NP   Endocrinology  7/12/2022  8:24 AM          Lab Results      Microalbumin Urine mg/dL   Date Value Ref Range Status   10/15/2020 6.40 (H) 0.00 - 1.99 mg/dL Final       Cholesterol   Date Value Ref Range Status   10/28/2021 142 <=199 mg/dL Final     Direct Measure HDL   Date Value Ref Range Status   10/28/2021 53 >=50 mg/dL Final     Comment:     HDL Cholesterol Reference Range:     0-2 years:   No reference ranges established for patients under 2 years old  at [employer] for lipid analytes.    2-8 years:  Greater than 45 mg/dL     18 years and older:   Female: Greater than or equal to 50 mg/dL   Male:   Greater than or equal to 40 mg/dL     Triglycerides   Date Value Ref Range Status   10/28/2021 89 <=149 mg/dL Final             Current Medications     Outpatient Medications Prior to Visit   Medication Sig Dispense Refill     ASPIRIN PO Take 81 mg by mouth daily       atorvastatin (LIPITOR) 20 MG tablet TAKE 1 TABLET(20 MG) BY MOUTH DAILY 90 tablet 2     Continuous Blood Gluc Sensor (ZookalYLE AUSTIN 14 DAY SENSOR) MISC CHANGE SENSOR EVERY 14 DAYS AS DIRECTED 6 each 1     insulin glargine (BASAGLAR KWIKPEN) 100 UNIT/ML pen INJECT 65 UNITS SUBCUTANEOUS EVERY MORNING AND 45 UNITS EVERY EVENING 100 mL 1     levothyroxine (SYNTHROID/LEVOTHROID) 75 MCG tablet TAKE ONE TABLET AT LEAST ONE HOUR BEFORE OR TWO HOURS AFTER A MEAL 90 tablet 2     lisinopril (ZESTRIL) 20 MG tablet Take 1 tablet (20 mg) by mouth daily 90 tablet 2     multivitamin (ONE-DAILY) tablet Take 1 tablet by mouth daily 90 tablet 3     NOVOLOG FLEXPEN 100 UNIT/ML soln ADMINISTER UP  UNITS UNDER THE SKIN DAILY PER SLIDING SCALE AS DIRECTED 90 mL 1     pantoprazole (PROTONIX) 40 MG EC tablet TAKE 1 TABLET(40 MG) BY MOUTH DAILY 90 tablet 1     traZODone (DESYREL) 100 MG tablet TAKE 1 TABLET BY MOUTH AT BEDTIME AS NEEDED FOR SLEEP 90 tablet 3     VITAMIN D3 25 MCG (1000 UT) tablet TAKE 2 TABLETS BY MOUTH EVERY  tablet 3     No facility-administered medications prior to visit.

## 2022-08-10 DIAGNOSIS — E55.9 VITAMIN D DEFICIENCY: ICD-10-CM

## 2022-08-11 RX ORDER — MULTIVITAMIN WITH FOLIC ACID 400 MCG
TABLET ORAL
Qty: 90 TABLET | Refills: 2 | Status: SHIPPED | OUTPATIENT
Start: 2022-08-11 | End: 2023-06-05

## 2022-08-11 NOTE — TELEPHONE ENCOUNTER
"Last Written Prescription Date:  8/26/21  Last Fill Quantity: 90,  # refills: 3   Last office visit provider:  5/31/22     Requested Prescriptions   Pending Prescriptions Disp Refills     Multiple Vitamin (DAILY-MICHAEL MULTIVITAMIN) TABS [Pharmacy Med Name: DAILY-MICHAEL TABLETS] 90 tablet 3     Sig: TAKE 1 TABLET BY MOUTH DAILY       Vitamin Supplements (Adult) Protocol Passed - 8/10/2022 11:28 PM        Passed - High dose Vitamin D not ordered        Passed - Recent (12 mo) or future (30 days) visit within the authorizing provider's specialty     Patient has had an office visit with the authorizing provider or a provider within the authorizing providers department within the previous 12 mos or has a future within next 30 days. See \"Patient Info\" tab in inbasket, or \"Choose Columns\" in Meds & Orders section of the refill encounter.              Passed - Medication is active on med list             Whit Feuntes RN 08/11/22 12:55 PM  "

## 2022-08-24 DIAGNOSIS — Z79.4 TYPE 2 DIABETES MELLITUS WITH COMPLICATION, WITH LONG-TERM CURRENT USE OF INSULIN (H): ICD-10-CM

## 2022-08-24 DIAGNOSIS — E11.8 TYPE 2 DIABETES MELLITUS WITH COMPLICATION, WITH LONG-TERM CURRENT USE OF INSULIN (H): ICD-10-CM

## 2022-08-26 RX ORDER — ATORVASTATIN CALCIUM 20 MG/1
20 TABLET, FILM COATED ORAL DAILY
Qty: 90 TABLET | Refills: 0 | Status: SHIPPED | OUTPATIENT
Start: 2022-08-26 | End: 2022-11-01

## 2022-08-26 NOTE — TELEPHONE ENCOUNTER
"Last Written Prescription Date:  11/17/21  Last Fill Quantity: 90,  # refills: 2   Last office visit provider:  5/31/22     Requested Prescriptions   Pending Prescriptions Disp Refills     atorvastatin (LIPITOR) 20 MG tablet [Pharmacy Med Name: ATORVASTATIN 20MG TABLETS] 90 tablet 2     Sig: TAKE 1 TABLET(20 MG) BY MOUTH DAILY       Statins Protocol Passed - 8/24/2022  2:56 PM        Passed - LDL on file in past 12 months     Recent Labs   Lab Test 10/28/21  1456   LDL 71             Passed - No abnormal creatine kinase in past 12 months     No lab results found.             Passed - Recent (12 mo) or future (30 days) visit within the authorizing provider's specialty     Patient has had an office visit with the authorizing provider or a provider within the authorizing providers department within the previous 12 mos or has a future within next 30 days. See \"Patient Info\" tab in inbasket, or \"Choose Columns\" in Meds & Orders section of the refill encounter.              Passed - Medication is active on med list        Passed - Patient is age 18 or older        Passed - No active pregnancy on record        Passed - No positive pregnancy test in past 12 months             Lance Franks RN 08/26/22 9:38 AM  "

## 2022-09-10 ENCOUNTER — HEALTH MAINTENANCE LETTER (OUTPATIENT)
Age: 67
End: 2022-09-10

## 2022-10-21 DIAGNOSIS — K21.9 GASTROESOPHAGEAL REFLUX DISEASE, UNSPECIFIED WHETHER ESOPHAGITIS PRESENT: ICD-10-CM

## 2022-10-23 RX ORDER — PANTOPRAZOLE SODIUM 40 MG/1
TABLET, DELAYED RELEASE ORAL
Qty: 90 TABLET | Refills: 1 | Status: SHIPPED | OUTPATIENT
Start: 2022-10-23 | End: 2023-04-13

## 2022-10-24 NOTE — TELEPHONE ENCOUNTER
"Last Written Prescription Date:  4/8/22  Last Fill Quantity: 90,  # refills: 1   Last office visit provider:  5/31/22     Requested Prescriptions   Pending Prescriptions Disp Refills     pantoprazole (PROTONIX) 40 MG EC tablet [Pharmacy Med Name: PANTOPRAZOLE 40MG TABLETS] 90 tablet 1     Sig: TAKE 1 TABLET(40 MG) BY MOUTH DAILY       PPI Protocol Passed - 10/21/2022  5:54 AM        Passed - Not on Clopidogrel (unless Pantoprazole ordered)        Passed - No diagnosis of osteoporosis on record        Passed - Recent (12 mo) or future (30 days) visit within the authorizing provider's specialty     Patient has had an office visit with the authorizing provider or a provider within the authorizing providers department within the previous 12 mos or has a future within next 30 days. See \"Patient Info\" tab in inbasket, or \"Choose Columns\" in Meds & Orders section of the refill encounter.              Passed - Medication is active on med list        Passed - Patient is age 18 or older        Passed - No active pregnacy on record        Passed - No positive pregnancy test in past 12 months             Jeimy Horan 10/23/22 8:55 PM  "

## 2022-10-28 DIAGNOSIS — E11.8 TYPE 2 DIABETES MELLITUS WITH COMPLICATION, WITH LONG-TERM CURRENT USE OF INSULIN (H): ICD-10-CM

## 2022-10-28 DIAGNOSIS — Z79.4 TYPE 2 DIABETES MELLITUS WITH COMPLICATION, WITH LONG-TERM CURRENT USE OF INSULIN (H): ICD-10-CM

## 2022-10-29 NOTE — TELEPHONE ENCOUNTER
"Due for ldl    Last Written Prescription Date:  8/26/22  Last Fill Quantity: 90,  # refills: 0   Last office visit provider:  5/13/22     Requested Prescriptions   Pending Prescriptions Disp Refills     atorvastatin (LIPITOR) 20 MG tablet [Pharmacy Med Name: ATORVASTATIN 20MG TABLETS] 90 tablet 0     Sig: TAKE 1 TABLET(20 MG) BY MOUTH DAILY       Statins Protocol Passed - 10/28/2022  5:28 AM        Passed - LDL on file in past 12 months     Recent Labs   Lab Test 10/28/21  1456   LDL 71             Passed - No abnormal creatine kinase in past 12 months     No lab results found.             Passed - Recent (12 mo) or future (30 days) visit within the authorizing provider's specialty     Patient has had an office visit with the authorizing provider or a provider within the authorizing providers department within the previous 12 mos or has a future within next 30 days. See \"Patient Info\" tab in inbasket, or \"Choose Columns\" in Meds & Orders section of the refill encounter.              Passed - Medication is active on med list        Passed - Patient is age 18 or older        Passed - No active pregnancy on record        Passed - No positive pregnancy test in past 12 months             Whit Fuentes, RN 10/29/22 6:37 PM  "

## 2022-10-31 RX ORDER — ATORVASTATIN CALCIUM 20 MG/1
TABLET, FILM COATED ORAL
Qty: 90 TABLET | Refills: 0 | OUTPATIENT
Start: 2022-10-31

## 2022-11-01 RX ORDER — ATORVASTATIN CALCIUM 20 MG/1
20 TABLET, FILM COATED ORAL DAILY
Qty: 30 TABLET | Refills: 0 | Status: SHIPPED | OUTPATIENT
Start: 2022-11-01 | End: 2022-11-28

## 2022-11-15 ASSESSMENT — ASTHMA QUESTIONNAIRES
QUESTION_5 LAST FOUR WEEKS HOW WOULD YOU RATE YOUR ASTHMA CONTROL: COMPLETELY CONTROLLED
QUESTION_4 LAST FOUR WEEKS HOW OFTEN HAVE YOU USED YOUR RESCUE INHALER OR NEBULIZER MEDICATION (SUCH AS ALBUTEROL): NOT AT ALL
QUESTION_3 LAST FOUR WEEKS HOW OFTEN DID YOUR ASTHMA SYMPTOMS (WHEEZING, COUGHING, SHORTNESS OF BREATH, CHEST TIGHTNESS OR PAIN) WAKE YOU UP AT NIGHT OR EARLIER THAN USUAL IN THE MORNING: NOT AT ALL
QUESTION_1 LAST FOUR WEEKS HOW MUCH OF THE TIME DID YOUR ASTHMA KEEP YOU FROM GETTING AS MUCH DONE AT WORK, SCHOOL OR AT HOME: NONE OF THE TIME
ACT_TOTALSCORE: 21
QUESTION_2 LAST FOUR WEEKS HOW OFTEN HAVE YOU HAD SHORTNESS OF BREATH: MORE THAN ONCE A DAY
ACT_TOTALSCORE: 21

## 2022-11-16 ENCOUNTER — VIRTUAL VISIT (OUTPATIENT)
Dept: INTERNAL MEDICINE | Facility: CLINIC | Age: 67
End: 2022-11-16
Payer: MEDICARE

## 2022-11-16 DIAGNOSIS — Z79.4 TYPE 2 DIABETES MELLITUS WITH MICROALBUMINURIA, WITH LONG-TERM CURRENT USE OF INSULIN (H): ICD-10-CM

## 2022-11-16 DIAGNOSIS — R80.9 TYPE 2 DIABETES MELLITUS WITH MICROALBUMINURIA, WITH LONG-TERM CURRENT USE OF INSULIN (H): ICD-10-CM

## 2022-11-16 DIAGNOSIS — E11.29 TYPE 2 DIABETES MELLITUS WITH MICROALBUMINURIA, WITH LONG-TERM CURRENT USE OF INSULIN (H): ICD-10-CM

## 2022-11-16 DIAGNOSIS — N18.31 STAGE 3A CHRONIC KIDNEY DISEASE (H): ICD-10-CM

## 2022-11-16 DIAGNOSIS — E03.9 HYPOTHYROIDISM, UNSPECIFIED TYPE: ICD-10-CM

## 2022-11-16 DIAGNOSIS — R06.09 DOE (DYSPNEA ON EXERTION): Primary | ICD-10-CM

## 2022-11-16 DIAGNOSIS — E66.01 MORBID OBESITY (H): ICD-10-CM

## 2022-11-16 DIAGNOSIS — E10.69 TYPE 1 DIABETES MELLITUS WITH OTHER SPECIFIED COMPLICATION (H): ICD-10-CM

## 2022-11-16 DIAGNOSIS — G47.33 OSA (OBSTRUCTIVE SLEEP APNEA): ICD-10-CM

## 2022-11-16 PROCEDURE — 99441 PR PHYSICIAN TELEPHONE EVALUATION 5-10 MIN: CPT | Mod: 95 | Performed by: INTERNAL MEDICINE

## 2022-11-16 RX ORDER — FLASH GLUCOSE SENSOR
KIT MISCELLANEOUS
Qty: 6 EACH | Refills: 1 | OUTPATIENT
Start: 2022-11-16

## 2022-11-16 NOTE — Clinical Note
Somehow asthma got put on patient's chart.  I did not put asthma on the chart and she doesn't have asthma.  Patient is upset that asthma got placed on the chart and asthma action plan is now showing up.  I have deleted asthma.  Please remove her from any asthma registries.

## 2022-11-16 NOTE — PROGRESS NOTES
Corinna is a 67 year old who is being evaluated via a billable telephone visit.      What phone number would you like to be contacted at? [phone]  How would you like to obtain your AVS? Keshia    1. FERNANDEZ (dyspnea on exertion)  We will have her undergo the echocardiogram as previously planned and follow-up with me with welcome to Medicare after.  - Echocardiogram Complete; Future    2. Morbid obesity (H)  Unclear what her weight is doing.  I would like to see her in the clinic.    3. Type 1 diabetes mellitus with other specified complication (H)  She is followed by endocrinology for this.  She does have a pump but now has continuous glucose monitor.    4. Stage 3a chronic kidney disease (H)  She is due for lab work.  We will get her into the clinic as soon as possible.    5. Hypothyroidism, unspecified type  Followed by endocrine for this.    6. GIOVANI (obstructive sleep apnea)  Refuses treatment for this.  We will have her undergo echocardiogram  - Echocardiogram Complete; Future    Subjective   Corinna is a 67 year old, presenting for the following health issues:  Recheck Medication (Labored breathing when she ambulates - patient wondering why the diagnoses)      HPI     Corinna comes in today via telephone for follow-up.  She has been hesitant to come in because she still owes money to our system.  She is now on Medicare as of a couple months ago.  I did asked that she come in when she got on Medicare but she has failed to do so.  She tells me she continues to have dyspnea with exertion although its not any worse.  I had wanted to do an echocardiogram for further evaluation as she has longstanding untreated sleep apnea and morbid obesity.  I am concerned about cardiac dysfunction.  She denies any chest pain.  She is upset that asthma showed up on her problem list.  I am not entirely sure how asthma got on there but it did.  She has never had a history of asthma nor has she even had pulmonary function testing or  taking any inhalers.      Review of Systems         Objective           Phone call duration: 8 minutes

## 2022-11-18 ENCOUNTER — TELEPHONE (OUTPATIENT)
Dept: ENDOCRINOLOGY | Facility: CLINIC | Age: 67
End: 2022-11-18

## 2022-11-18 DIAGNOSIS — R80.9 TYPE 2 DIABETES MELLITUS WITH MICROALBUMINURIA, WITH LONG-TERM CURRENT USE OF INSULIN (H): ICD-10-CM

## 2022-11-18 DIAGNOSIS — Z79.4 TYPE 2 DIABETES MELLITUS WITH MICROALBUMINURIA, WITH LONG-TERM CURRENT USE OF INSULIN (H): ICD-10-CM

## 2022-11-18 DIAGNOSIS — E11.29 TYPE 2 DIABETES MELLITUS WITH MICROALBUMINURIA, WITH LONG-TERM CURRENT USE OF INSULIN (H): ICD-10-CM

## 2022-11-18 RX ORDER — FLASH GLUCOSE SENSOR
KIT MISCELLANEOUS
Qty: 2 EACH | Refills: 3 | Status: SHIPPED | OUTPATIENT
Start: 2022-11-18 | End: 2023-01-12

## 2022-11-18 NOTE — TELEPHONE ENCOUNTER
[facility] Call Center    Phone Message    May a detailed message be left on voicemail: yes     Reason for Call: Medication Refill Request    Has the patient contacted the pharmacy for the refill? Yes   Name of medication being requested: Continuous Blood Gluc Sensor (FREESTYLE AUSTIN 14 DAY SENSOR) [facility]  Provider who prescribed the medication: Breana Pires RN  Pharmacy: [facility] [address] [facility]  Date medication is needed: ASAP, Patient will be out at the end of the month. [facility] states that they haven't gotten a response from their request. Please follow up. Thank you.   {      Action Taken: Other: Endo    Travel Screening: Not Applicable

## 2022-11-27 DIAGNOSIS — Z79.4 TYPE 2 DIABETES MELLITUS WITH COMPLICATION, WITH LONG-TERM CURRENT USE OF INSULIN (H): ICD-10-CM

## 2022-11-27 DIAGNOSIS — E11.8 TYPE 2 DIABETES MELLITUS WITH COMPLICATION, WITH LONG-TERM CURRENT USE OF INSULIN (H): ICD-10-CM

## 2022-11-28 RX ORDER — ATORVASTATIN CALCIUM 20 MG/1
20 TABLET, FILM COATED ORAL DAILY
Qty: 90 TABLET | Refills: 0 | Status: SHIPPED | OUTPATIENT
Start: 2022-11-28 | End: 2023-03-31

## 2022-11-28 NOTE — TELEPHONE ENCOUNTER
"Routing refill request to provider for review/approval because:  Labs not current:  LDL    Last Written Prescription Date:  11/1/22  Last Fill Quantity: 30,  # refills: 0   Last office visit provider:  11/16/22     Requested Prescriptions   Pending Prescriptions Disp Refills     atorvastatin (LIPITOR) 20 MG tablet [Pharmacy Med Name: ATORVASTATIN 20MG TABLETS] 90 tablet      Sig: TAKE 1 TABLET(20 MG) BY MOUTH DAILY       Statins Protocol Failed - 11/27/2022  5:33 AM        Failed - LDL on file in past 12 months     Recent Labs   Lab Test 10/28/21  1456   LDL 71             Passed - No abnormal creatine kinase in past 12 months     No lab results found.             Passed - Recent (12 mo) or future (30 days) visit within the authorizing provider's specialty     Patient has had an office visit with the authorizing provider or a provider within the authorizing providers department within the previous 12 mos or has a future within next 30 days. See \"Patient Info\" tab in inbasket, or \"Choose Columns\" in Meds & Orders section of the refill encounter.              Passed - Medication is active on med list        Passed - Patient is age 18 or older        Passed - No active pregnancy on record        Passed - No positive pregnancy test in past 12 months             Lance Franks RN 11/28/22 11:11 AM  "

## 2022-12-15 ENCOUNTER — TELEPHONE (OUTPATIENT)
Dept: ENDOCRINOLOGY | Facility: CLINIC | Age: 67
End: 2022-12-15

## 2022-12-16 NOTE — TELEPHONE ENCOUNTER
Central Prior Authorization Team   Phone: [phone]      PA Initiation    Medication: NOVOLOG FLEXPEN 100 UNIT/ML soln - INITIATED  Insurance Company: Michael ([facility]) - Phone [phone] Fax [phone]  Pharmacy Filling the Rx: [id number] - [place], [place] - [address] [place], [place] [address]A  [address] [facility] [address]  Filling Pharmacy Phone: [phone]  Filling Pharmacy Fax:    Start Date: 12/16/2022

## 2022-12-16 NOTE — TELEPHONE ENCOUNTER
PRIOR AUTHORIZATION DENIED    Medication: NOVOLOG FLEXPEN 100 UNIT/ML soln - PA DENIED    Denial Date: 12/16/2022    Denial Rational: MUST TRY/FAIL HUMALOG CARTRIDGE, HUMALOG ESTHER KWIKPEN, HUMALOG KWIKPEN, INSULIN LISPRO KWIKPEN, OR LYUMJEV KWIKPEN. OR PATIENT MUST HAVE A CONTRAINDICATION TO THE ABOVE LISTED MEDICATIONS        Appeal Information: IF PROVIDER WOULD LIKE TO APPEAL THIS DECISION PLEASE PROVIDE PA TEAM WITH LETTER OF MEDICAL NECESSITY

## 2022-12-18 DIAGNOSIS — E10.69 TYPE 1 DIABETES MELLITUS WITH OTHER SPECIFIED COMPLICATION (H): Primary | ICD-10-CM

## 2022-12-18 RX ORDER — INSULIN LISPRO 100 [IU]/ML
INJECTION, SOLUTION INTRAVENOUS; SUBCUTANEOUS
Qty: 75 ML | Refills: 3 | Status: SHIPPED | OUTPATIENT
Start: 2022-12-18 | End: 2023-11-27

## 2023-01-02 ENCOUNTER — LAB (OUTPATIENT)
Dept: LAB | Facility: CLINIC | Age: 68
End: 2023-01-02
Payer: MEDICARE

## 2023-01-02 ENCOUNTER — TELEPHONE (OUTPATIENT)
Dept: ENDOCRINOLOGY | Facility: CLINIC | Age: 68
End: 2023-01-02

## 2023-01-02 DIAGNOSIS — E10.69 TYPE 1 DIABETES MELLITUS WITH OTHER SPECIFIED COMPLICATION (H): Primary | ICD-10-CM

## 2023-01-02 DIAGNOSIS — E10.69 TYPE 1 DIABETES MELLITUS WITH OTHER SPECIFIED COMPLICATION (H): ICD-10-CM

## 2023-01-02 LAB
CHOLEST SERPL-MCNC: 146 MG/DL
CREAT SERPL-MCNC: 1.07 MG/DL (ref 0.51–0.95)
GFR SERPL CREATININE-BSD FRML MDRD: 57 ML/MIN/1.73M2
HBA1C MFR BLD: 7.3 % (ref 0–5.6)
HDLC SERPL-MCNC: 50 MG/DL
LDLC SERPL CALC-MCNC: 74 MG/DL
NONHDLC SERPL-MCNC: 96 MG/DL
TRIGL SERPL-MCNC: 108 MG/DL

## 2023-01-02 PROCEDURE — 82565 ASSAY OF CREATININE: CPT

## 2023-01-02 PROCEDURE — 80061 LIPID PANEL: CPT

## 2023-01-02 PROCEDURE — 83036 HEMOGLOBIN GLYCOSYLATED A1C: CPT

## 2023-01-02 PROCEDURE — 36415 COLL VENOUS BLD VENIPUNCTURE: CPT

## 2023-01-02 NOTE — TELEPHONE ENCOUNTER
[facility] Call [facility]    Phone Message    May a detailed message be left on voicemail: yes     Reason for Call: Medication Question or concern regarding medication   Prescription Clarification  Name of Medication: lantus insulin  Prescribing Provider: Rossy Baig   Pharmacy: [facility], [facility] [facility] [address], [phone]   What on the order needs clarification? Pt called and stated since her insurance changed the above insulin is covered under insuracne and pt is needing a new script sent to the above pharmacy

## 2023-01-10 ENCOUNTER — OFFICE VISIT (OUTPATIENT)
Dept: ENDOCRINOLOGY | Facility: CLINIC | Age: 68
End: 2023-01-10
Payer: MEDICARE

## 2023-01-10 VITALS
WEIGHT: 293 LBS | HEART RATE: 96 BPM | BODY MASS INDEX: 63.57 KG/M2 | SYSTOLIC BLOOD PRESSURE: 132 MMHG | DIASTOLIC BLOOD PRESSURE: 74 MMHG

## 2023-01-10 DIAGNOSIS — E10.69 TYPE 1 DIABETES MELLITUS WITH OTHER SPECIFIED COMPLICATION (H): Primary | ICD-10-CM

## 2023-01-10 DIAGNOSIS — E66.01 MORBID OBESITY (H): ICD-10-CM

## 2023-01-10 PROCEDURE — 99213 OFFICE O/P EST LOW 20 MIN: CPT | Performed by: NURSE PRACTITIONER

## 2023-01-10 NOTE — LETTER
1/10/2023         RE: Corinna Farias  [address]  [place] [address]        Dear Colleague,    Thank you for referring your patient, Corinna Farias, to the [facility]. Please see a copy of my visit note below.    [facility] ENDOCRINOLOGY    Diabetes Note 1/10/2023    Corinna Farias, 1955, [id number]          Reason for visit      1. Morbid obesity (H)    2. Type 1 diabetes mellitus with other specified complication (H)        HPI     Corinna Farias is a very pleasant 67 year old old female who presents for follow up.  SUMMARY:    Corinna is here today in f/u for DM 1 and obesity. Her current A1c is 7.3 and very similar to her previous one of 7.2. She works hard to keep her BG well managed. She is using the Waldemar CGM and her last two weeks show that she was within range 50% of the time. She had no hypoglycemia demonstrated. She remains on 65 units in the morning and 45 units in the evening. She takes Novolog with meals, according to what she is eating. She remains on Lipitor and Lisinopril protectively. Current Lipid profile is very good. Last BMP showed Renal function within normal range.     Pt has been considering meeting with Bariatrics. She does have a hx of GB and is concerned regarding another procedure. Assured that she can go and get information and then consider whatever they suggest.       Blood glucose data:      Past Medical History     Patient Active Problem List   Diagnosis     Bariatric surgery status (GASTRIC BYPASS)     Vitamin D deficiency     Essential hypertension, benign     GERD (gastroesophageal reflux disease)     Hypothyroidism     Microalbuminuric diabetic nephropathy (H)     Morbid obesity (H)     GIOVANI (obstructive sleep apnea)     Chronic kidney disease, stage 3     Cervical cancer screening     Encounter for long-term (current) use of insulin (H)     Female climacteric state     Insomnia     Insulin pump status     Pseudophakia      Type 1 diabetes mellitus (H)        Family History       family history includes Diabetes in her father and mother.    Social History      reports that she has never smoked. She has never used smokeless tobacco. She reports that she does not drink alcohol and does not use drugs.      Review of Systems     Patient has no polyuria or polydipsia, no chest pain, dyspnea or TIA's, no numbness, tingling or pain in extremities  Remainder negative except as noted in HPI.    Answers for HPI/ROS submitted by the patient on 1/9/2023  General Symptoms: No  Skin Symptoms: No  HENT Symptoms: No  EYE SYMPTOMS: No  HEART SYMPTOMS: No  LUNG SYMPTOMS: No  INTESTINAL SYMPTOMS: No  URINARY SYMPTOMS: No  GYNECOLOGIC SYMPTOMS: No  BREAST SYMPTOMS: No  SKELETAL SYMPTOMS: No  BLOOD SYMPTOMS: No  NERVOUS SYSTEM SYMPTOMS: No  MENTAL HEALTH SYMPTOMS: No        Vital Signs     /76 (BP Location: Right arm, Patient Position: Sitting, Cuff Size: Adult Large)   Pulse 96   Wt (!) 173.3 kg (382 lb)   LMP  (LMP Unknown)   BMI 63.57 kg/m    Wt Readings from Last 3 Encounters:   01/10/23 (!) 173.3 kg (382 lb)   07/12/22 (!) 169.8 kg (374 lb 4.8 oz)   05/06/22 (!) 171.9 kg (379 lb)       Physical Exam     Constitutional:  Well developed, Well nourished, obese  HENT:  Normocephalic,   Neck: Thyroid normal, No lymph nodes, Supple  Eyes:  PERRL, Conjunctiva pink  Respiratory:  Normal breath sounds, No respiratory distress  Cardiovascular:  Normal heart rate, Normal rhythm, No murmurs  GI:  Bowel sounds normal, Soft, No tenderness  Musculoskeletal:  No gross deformity or lesions, normal dorsalis pedis pulses  Skin: No acanthosis nigricans,   Neurologic:  Alert & oriented x 3, nonfocal  Psychiatric:  Affect, Mood, Insight appropriate  Diabetic foot exam: no ulcers, charcot's or high risk calluses,         Assessment     1. Morbid obesity (H)    2. Type 1 diabetes mellitus with other specified complication (H)        Plan     Pt's DM control is  stable. No changes warranted today. She will f/u with me in 6 months.    Pt agreed to referral to Bariatrics for non-surgical intervention.       Rossy Baig NP  [facility]  1/10/2023  8:20 AM        Lab Results     Microalbumin Urine mg/dL   Date Value Ref Range Status   10/15/2020 6.40 (H) 0.00 - 1.99 mg/dL Final       Cholesterol   Date Value Ref Range Status   01/02/2023 146 <200 mg/dL Final     Direct Measure HDL   Date Value Ref Range Status   01/02/2023 50 >=50 mg/dL Final     Triglycerides   Date Value Ref Range Status   01/02/2023 108 <150 mg/dL Final             Current Medications     Outpatient Medications Prior to Visit   Medication Sig Dispense Refill     ASPIRIN PO Take 81 mg by mouth daily       atorvastatin (LIPITOR) 20 MG tablet Take 1 tablet (20 mg) by mouth daily 90 tablet 0     Continuous Blood Gluc Sensor ([employer] AUSTIN 14 DAY SENSOR) MISC CHANGE SENSOR EVERY 14 DAYS AS DIRECTED 2 each 3     insulin glargine (BASAGLAR KWIKPEN) 100 UNIT/ML pen INJECT 65 UNITS SUBCUTANEOUS EVERY MORNING AND 45 UNITS EVERY EVENING 100 mL 1     insulin lispro (HUMALOG KWIKPEN) 100 UNIT/ML (1 unit dial) KWIKPEN Use three times daily with meals, up to 100 units daily 75 mL 3     levothyroxine (SYNTHROID/LEVOTHROID) 75 MCG tablet TAKE ONE TABLET AT LEAST ONE HOUR BEFORE OR TWO HOURS AFTER A MEAL 90 tablet 2     lisinopril (ZESTRIL) 20 MG tablet Take 1 tablet (20 mg) by mouth daily 90 tablet 2     Multiple Vitamin (DAILY-MICHAEL MULTIVITAMIN) TABS TAKE 1 TABLET BY MOUTH DAILY 90 tablet 2     pantoprazole (PROTONIX) 40 MG EC tablet TAKE 1 TABLET(40 MG) BY MOUTH DAILY 90 tablet 1     traZODone (DESYREL) 100 MG tablet TAKE 1 TABLET BY MOUTH AT BEDTIME AS NEEDED FOR SLEEP 90 tablet 3     VITAMIN D3 25 MCG (1000 UT) tablet TAKE 2 TABLETS BY MOUTH EVERY  tablet 3     insulin glargine (LANTUS PEN) 100 UNIT/ML pen INJECT 65 UNITS SUBCUTANEOUS EVERY MORNING AND 45 UNITS EVERY EVENING 30 mL 0     NOVOLOG FLEXPEN  100 UNIT/ML soln ADMINISTER UP  UNITS UNDER THE SKIN DAILY PER SLIDING SCALE AS DIRECTED 90 mL 1     No facility-administered medications prior to visit.                               Again, thank you for allowing me to participate in the care of your patient.        Sincerely,        Rossy Baig NP

## 2023-01-12 DIAGNOSIS — Z79.4 TYPE 2 DIABETES MELLITUS WITH MICROALBUMINURIA, WITH LONG-TERM CURRENT USE OF INSULIN (H): ICD-10-CM

## 2023-01-12 DIAGNOSIS — R80.9 TYPE 2 DIABETES MELLITUS WITH MICROALBUMINURIA, WITH LONG-TERM CURRENT USE OF INSULIN (H): ICD-10-CM

## 2023-01-12 DIAGNOSIS — E11.29 TYPE 2 DIABETES MELLITUS WITH MICROALBUMINURIA, WITH LONG-TERM CURRENT USE OF INSULIN (H): ICD-10-CM

## 2023-01-12 RX ORDER — FLASH GLUCOSE SENSOR
KIT MISCELLANEOUS
Qty: 6 EACH | Refills: 1 | Status: SHIPPED | OUTPATIENT
Start: 2023-01-12 | End: 2023-02-13

## 2023-01-22 ENCOUNTER — HEALTH MAINTENANCE LETTER (OUTPATIENT)
Age: 68
End: 2023-01-22

## 2023-01-24 ENCOUNTER — TELEPHONE (OUTPATIENT)
Dept: ENDOCRINOLOGY | Facility: CLINIC | Age: 68
End: 2023-01-24
Payer: MEDICARE

## 2023-01-24 DIAGNOSIS — E10.69 TYPE 1 DIABETES MELLITUS WITH OTHER SPECIFIED COMPLICATION (H): Primary | ICD-10-CM

## 2023-01-24 RX ORDER — INSULIN GLARGINE 100 [IU]/ML
INJECTION, SOLUTION SUBCUTANEOUS
Qty: 105 ML | Refills: 0 | Status: SHIPPED | OUTPATIENT
Start: 2023-01-24 | End: 2023-05-08

## 2023-01-24 NOTE — TELEPHONE ENCOUNTER
[facility] Call [facility]    Phone Message    May a detailed message be left on voicemail: yes     Reason for Call: Medication Question or concern regarding medication   Prescription Clarification  Name of Medication: lantus  Prescribing Provider: Rossy Baig   Pharmacy:    What on the order needs clarification? Pt requesting call back to discuss her script of lantus

## 2023-01-25 ASSESSMENT — ENCOUNTER SYMPTOMS
DIZZINESS: 0
JOINT SWELLING: 0
BREAST MASS: 0
NAUSEA: 0
ABDOMINAL PAIN: 0
FREQUENCY: 0
COUGH: 0
SORE THROAT: 0
MYALGIAS: 0
DIARRHEA: 0
DYSURIA: 0
CHILLS: 0
FEVER: 0
SHORTNESS OF BREATH: 0
ARTHRALGIAS: 0
PALPITATIONS: 0
HEADACHES: 0
HEMATOCHEZIA: 0
NERVOUS/ANXIOUS: 0
HEARTBURN: 0
CONSTIPATION: 0
HEMATURIA: 0
PARESTHESIAS: 0
EYE PAIN: 0

## 2023-01-25 ASSESSMENT — ACTIVITIES OF DAILY LIVING (ADL): CURRENT_FUNCTION: NO ASSISTANCE NEEDED

## 2023-01-26 DIAGNOSIS — I10 ESSENTIAL HYPERTENSION, BENIGN: ICD-10-CM

## 2023-01-27 ENCOUNTER — TELEPHONE (OUTPATIENT)
Dept: INTERNAL MEDICINE | Facility: CLINIC | Age: 68
End: 2023-01-27
Payer: MEDICARE

## 2023-01-27 DIAGNOSIS — I10 ESSENTIAL HYPERTENSION, BENIGN: ICD-10-CM

## 2023-01-27 RX ORDER — LISINOPRIL 20 MG/1
TABLET ORAL
Qty: 90 TABLET | Refills: 2 | Status: SHIPPED | OUTPATIENT
Start: 2023-01-27 | End: 2023-09-29

## 2023-01-27 NOTE — TELEPHONE ENCOUNTER
Spoke with patient and prescription was sent this morning. If patient needs to fill at another [facility] she was informed that she can have the prescription transferred by [facility] the [facility].     Pt stated she will call her pharmacy.

## 2023-01-27 NOTE — TELEPHONE ENCOUNTER
"Routing refill request to provider for review/approval because:  Labs out of range:  cr    Last Written Prescription Date:  3/2/22  Last Fill Quantity: 90,  # refills: 2   Last office visit provider:  11/16/22     Requested Prescriptions   Pending Prescriptions Disp Refills     lisinopril (ZESTRIL) 20 MG tablet [Pharmacy Med Name: LISINOPRIL 20MG TABLETS] 90 tablet 2     Sig: TAKE 1 TABLET(20 MG) BY MOUTH DAILY       ACE Inhibitors (Including Combos) Protocol Failed - 1/26/2023  5:31 AM        Failed - Normal serum creatinine on file in past 12 months     Recent Labs   Lab Test 01/02/23  0805   CR 1.07*       Ok to refill medication if creatinine is low          Passed - Blood pressure under 140/90 in past 12 months     BP Readings from Last 3 Encounters:   01/10/23 132/74   07/12/22 138/72   05/06/22 132/72                 Passed - Recent (12 mo) or future (30 days) visit within the authorizing provider's specialty     Patient has had an office visit with the authorizing provider or a provider within the authorizing providers department within the previous 12 mos or has a future within next 30 days. See \"Patient Info\" tab in inbasket, or \"Choose Columns\" in Meds & Orders section of the refill encounter.              Passed - Medication is active on med list        Passed - Patient is age 18 or older        Passed - No active pregnancy on record        Passed - Normal serum potassium on file in past 12 months     Recent Labs   Lab Test 07/01/22  0817   POTASSIUM 4.2             Passed - No positive pregnancy test within past 12 months             Whit Fuentes RN 01/26/23 6:04 PM  "

## 2023-01-27 NOTE — TELEPHONE ENCOUNTER
General Call      Reason for Call:  Rx for Lisinopril needs to [facility] to the [facility] [address] [facility]    Please call patient when this has been tranferred over    What are your questions or concerns:  n/a    Date of last appointment with provider: n/a    Could we send this information to you in [facility] or would you prefer to receive a phone call?:   Patient would prefer a phone call   Okay to leave a detailed message?: Yes at Cell number on file:    Telephone Information:   Mobile [phone]

## 2023-01-30 ENCOUNTER — OFFICE VISIT (OUTPATIENT)
Dept: INTERNAL MEDICINE | Facility: CLINIC | Age: 68
End: 2023-01-30
Payer: MEDICARE

## 2023-01-30 VITALS
TEMPERATURE: 97.8 F | BODY MASS INDEX: 48.82 KG/M2 | HEIGHT: 65 IN | SYSTOLIC BLOOD PRESSURE: 126 MMHG | RESPIRATION RATE: 16 BRPM | OXYGEN SATURATION: 98 % | HEART RATE: 100 BPM | DIASTOLIC BLOOD PRESSURE: 78 MMHG | WEIGHT: 293 LBS

## 2023-01-30 DIAGNOSIS — E10.69 TYPE 1 DIABETES MELLITUS WITH OTHER SPECIFIED COMPLICATION (H): ICD-10-CM

## 2023-01-30 DIAGNOSIS — Z00.00 WELCOME TO MEDICARE PREVENTIVE VISIT: Primary | ICD-10-CM

## 2023-01-30 DIAGNOSIS — N18.31 STAGE 3A CHRONIC KIDNEY DISEASE (H): ICD-10-CM

## 2023-01-30 DIAGNOSIS — E03.9 HYPOTHYROIDISM, UNSPECIFIED TYPE: ICD-10-CM

## 2023-01-30 DIAGNOSIS — E55.9 VITAMIN D DEFICIENCY: ICD-10-CM

## 2023-01-30 DIAGNOSIS — Z12.31 VISIT FOR SCREENING MAMMOGRAM: ICD-10-CM

## 2023-01-30 DIAGNOSIS — G47.33 OSA (OBSTRUCTIVE SLEEP APNEA): ICD-10-CM

## 2023-01-30 DIAGNOSIS — E66.01 MORBID OBESITY (H): ICD-10-CM

## 2023-01-30 DIAGNOSIS — Z78.0 MENOPAUSE: ICD-10-CM

## 2023-01-30 LAB
ALBUMIN SERPL BCG-MCNC: 3.9 G/DL (ref 3.5–5.2)
ALBUMIN UR-MCNC: ABNORMAL MG/DL
ALP SERPL-CCNC: 123 U/L (ref 35–104)
ALT SERPL W P-5'-P-CCNC: 24 U/L (ref 10–35)
ANION GAP SERPL CALCULATED.3IONS-SCNC: 12 MMOL/L (ref 7–15)
APPEARANCE UR: CLEAR
AST SERPL W P-5'-P-CCNC: 23 U/L (ref 10–35)
ATRIAL RATE - MUSE: 103 BPM
BACTERIA #/AREA URNS HPF: ABNORMAL /HPF
BILIRUB SERPL-MCNC: 0.6 MG/DL
BILIRUB UR QL STRIP: NEGATIVE
BUN SERPL-MCNC: 24.3 MG/DL (ref 8–23)
CALCIUM SERPL-MCNC: 10.1 MG/DL (ref 8.8–10.2)
CHLORIDE SERPL-SCNC: 103 MMOL/L (ref 98–107)
COLOR UR AUTO: YELLOW
CREAT SERPL-MCNC: 1.12 MG/DL (ref 0.51–0.95)
CREAT UR-MCNC: 287 MG/DL
DEPRECATED CALCIDIOL+CALCIFEROL SERPL-MC: 39 UG/L (ref 20–75)
DEPRECATED HCO3 PLAS-SCNC: 24 MMOL/L (ref 22–29)
DIASTOLIC BLOOD PRESSURE - MUSE: NORMAL MMHG
ERYTHROCYTE [DISTWIDTH] IN BLOOD BY AUTOMATED COUNT: 11.9 % (ref 10–15)
GFR SERPL CREATININE-BSD FRML MDRD: 54 ML/MIN/1.73M2
GLUCOSE SERPL-MCNC: 225 MG/DL (ref 70–99)
GLUCOSE UR STRIP-MCNC: NEGATIVE MG/DL
HCT VFR BLD AUTO: 43.4 % (ref 35–47)
HGB BLD-MCNC: 14.3 G/DL (ref 11.7–15.7)
HGB UR QL STRIP: ABNORMAL
HYALINE CASTS #/AREA URNS LPF: ABNORMAL /LPF
INTERPRETATION ECG - MUSE: NORMAL
KETONES UR STRIP-MCNC: NEGATIVE MG/DL
LEUKOCYTE ESTERASE UR QL STRIP: ABNORMAL
MCH RBC QN AUTO: 33.5 PG (ref 26.5–33)
MCHC RBC AUTO-ENTMCNC: 32.9 G/DL (ref 31.5–36.5)
MCV RBC AUTO: 102 FL (ref 78–100)
MICROALBUMIN UR-MCNC: 21 MG/L
MICROALBUMIN/CREAT UR: 7.32 MG/G CR (ref 0–25)
MUCOUS THREADS #/AREA URNS LPF: PRESENT /LPF
NITRATE UR QL: NEGATIVE
P AXIS - MUSE: 49 DEGREES
PH UR STRIP: 5 [PH] (ref 5–8)
PLATELET # BLD AUTO: 294 10E3/UL (ref 150–450)
POTASSIUM SERPL-SCNC: 4.3 MMOL/L (ref 3.4–5.3)
PR INTERVAL - MUSE: 146 MS
PROT SERPL-MCNC: 7.5 G/DL (ref 6.4–8.3)
QRS DURATION - MUSE: 82 MS
QT - MUSE: 326 MS
QTC - MUSE: 427 MS
R AXIS - MUSE: 62 DEGREES
RBC # BLD AUTO: 4.27 10E6/UL (ref 3.8–5.2)
RBC #/AREA URNS AUTO: ABNORMAL /HPF
SODIUM SERPL-SCNC: 139 MMOL/L (ref 136–145)
SP GR UR STRIP: >=1.03 (ref 1–1.03)
SQUAMOUS #/AREA URNS AUTO: ABNORMAL /LPF
SYSTOLIC BLOOD PRESSURE - MUSE: NORMAL MMHG
T AXIS - MUSE: 57 DEGREES
TSH SERPL DL<=0.005 MIU/L-ACNC: 2.47 UIU/ML (ref 0.3–4.2)
UROBILINOGEN UR STRIP-ACNC: 1 E.U./DL
VENTRICULAR RATE- MUSE: 103 BPM
WBC # BLD AUTO: 8.7 10E3/UL (ref 4–11)
WBC #/AREA URNS AUTO: ABNORMAL /HPF

## 2023-01-30 PROCEDURE — 90732 PPSV23 VACC 2 YRS+ SUBQ/IM: CPT | Performed by: INTERNAL MEDICINE

## 2023-01-30 PROCEDURE — 85027 COMPLETE CBC AUTOMATED: CPT | Performed by: INTERNAL MEDICINE

## 2023-01-30 PROCEDURE — 82043 UR ALBUMIN QUANTITATIVE: CPT | Performed by: INTERNAL MEDICINE

## 2023-01-30 PROCEDURE — 36415 COLL VENOUS BLD VENIPUNCTURE: CPT | Performed by: INTERNAL MEDICINE

## 2023-01-30 PROCEDURE — G0402 INITIAL PREVENTIVE EXAM: HCPCS | Performed by: INTERNAL MEDICINE

## 2023-01-30 PROCEDURE — G0009 ADMIN PNEUMOCOCCAL VACCINE: HCPCS | Performed by: INTERNAL MEDICINE

## 2023-01-30 PROCEDURE — 99214 OFFICE O/P EST MOD 30 MIN: CPT | Mod: 25 | Performed by: INTERNAL MEDICINE

## 2023-01-30 PROCEDURE — 87086 URINE CULTURE/COLONY COUNT: CPT | Performed by: INTERNAL MEDICINE

## 2023-01-30 PROCEDURE — 82306 VITAMIN D 25 HYDROXY: CPT | Performed by: INTERNAL MEDICINE

## 2023-01-30 PROCEDURE — 82570 ASSAY OF URINE CREATININE: CPT | Performed by: INTERNAL MEDICINE

## 2023-01-30 PROCEDURE — G0404 EKG TRACING FOR INITIAL PREV: HCPCS | Performed by: INTERNAL MEDICINE

## 2023-01-30 PROCEDURE — G0405 EKG INTERPRET & REPORT PREVE: HCPCS | Mod: OFF | Performed by: INTERNAL MEDICINE

## 2023-01-30 PROCEDURE — 84443 ASSAY THYROID STIM HORMONE: CPT | Performed by: INTERNAL MEDICINE

## 2023-01-30 PROCEDURE — 81001 URINALYSIS AUTO W/SCOPE: CPT | Performed by: INTERNAL MEDICINE

## 2023-01-30 PROCEDURE — 80053 COMPREHEN METABOLIC PANEL: CPT | Performed by: INTERNAL MEDICINE

## 2023-01-30 RX ORDER — INSULIN GLARGINE-YFGN 100 [IU]/ML
INJECTION, SOLUTION SUBCUTANEOUS
Qty: 30 ML | OUTPATIENT
Start: 2023-01-30

## 2023-01-30 ASSESSMENT — ENCOUNTER SYMPTOMS
CONSTIPATION: 0
HEADACHES: 0
FEVER: 0
ARTHRALGIAS: 0
HEMATOCHEZIA: 0
BREAST MASS: 0
SORE THROAT: 0
JOINT SWELLING: 0
DYSURIA: 0
HEARTBURN: 0
ABDOMINAL PAIN: 0
NAUSEA: 0
COUGH: 0
FREQUENCY: 0
NERVOUS/ANXIOUS: 0
PALPITATIONS: 0
MYALGIAS: 0
DIZZINESS: 0
HEMATURIA: 0
EYE PAIN: 0
SHORTNESS OF BREATH: 0
DIARRHEA: 0
CHILLS: 0
PARESTHESIAS: 0

## 2023-01-30 ASSESSMENT — ACTIVITIES OF DAILY LIVING (ADL): CURRENT_FUNCTION: NO ASSISTANCE NEEDED

## 2023-01-30 NOTE — PROGRESS NOTES
"SUBJECTIVE:   Corinna is a 67 year old who presents for Preventive Visit.    Corinna is a nice 67-year-old woman who comes in for welcome to Medicare.  She has type 1 diabetes and morbid obesity.  She has untreated sleep apnea.  She does not feel the need for CPAP.  Denies any chest pains.  She has longstanding dyspnea on exertion due to her morbid obesity which is unchanged.  She is fairly inactive.  She does utilize a mobility scooter at times.  Her A1c was recently low sevens.  Only rare low sugars she reports.   continues to work for another couple years.  He works in maintenance in a [employer]      Patient has been advised of split billing requirements and indicates understanding: Yes  Are you in the first 12 months of your Medicare coverage?  Yes,  Visual Acuity:  Right Eye: 20/25   Left Eye: 20/40     Healthy Habits:     In general, how would you rate your overall health?  Fair    Frequency of exercise:  None    Do you usually eat at least 4 servings of fruit and vegetables a day, include whole grains    & fiber and avoid regularly eating high fat or \"junk\" foods?  No    Taking medications regularly:  Yes    Medication side effects:  None    Ability to successfully perform activities of daily living:  No assistance needed    Home Safety:  No safety concerns identified    Hearing Impairment:  No hearing concerns    In the past 6 months, have you been bothered by leaking of urine?  No    In general, how would you rate your overall mental or emotional health?  Excellent      PHQ-2 Total Score: 0    Additional concerns today:  No      Have you ever done Advance Care Planning? (For example, a Health Directive, POLST, or a discussion with a medical provider or your loved ones about your wishes): No, advance care planning information given to patient to review.  Patient plans to discuss their wishes with loved ones or provider.         Fall risk  Fallen 2 or more times in the past year?: No  Any fall with " injury in the past year?: No    Cognitive Screening   1) Repeat 3 items (Leader, Season, Table)    2) Clock draw: NORMAL  3) 3 item recall: Recalls 3 objects  Results: Normal Clock     Mini-CogTM Copyright S Deisy. Licensed by the author for use in [facility]; reprinted with permission (chidi@[facility]). All rights reserved.      Do you have sleep apnea, excessive snoring or daytime drowsiness?: yes    Reviewed and updated as needed this visit by clinical staff   Tobacco  Allergies  Meds              Reviewed and updated as needed this visit by Provider                 Social History     Tobacco Use     Smoking status: Never     Smokeless tobacco: Never   Substance Use Topics     Alcohol use: No     If you drink alcohol do you typically have >3 drinks per day or >7 drinks per week? No    Alcohol Use 1/25/2023   Prescreen: >3 drinks/day or >7 drinks/week? Not Applicable   Prescreen: >3 drinks/day or >7 drinks/week? -     Current providers sharing in care for this patient include:   Patient Care Team:  Masood Melton MD as PCP - Elieser Mccoy MD as MD (Surgery)  Masood Melton MD as Assigned PCP  Rossy Baig NP as Nurse Practitioner  Nuno Pompa MD as Assigned Heart and Vascular Provider    The following health maintenance items are reviewed in Epic and correct as of today:  Health Maintenance   Topic Date Due     DEXA  Never done     MAMMO SCREENING  03/07/2020     MICROALBUMIN  10/15/2021     MEDICARE ANNUAL WELLNESS VISIT  10/28/2022     DIABETIC FOOT EXAM  10/28/2022     ANNUAL REVIEW OF HM ORDERS  10/28/2022     EYE EXAM  12/21/2022     Pneumococcal Vaccine: 65+ Years (3 - PPSV23 if available, else PCV20) 09/20/2022     HEMOGLOBIN  04/28/2023     BMP  07/01/2023     A1C  07/02/2023     LIPID  01/02/2024     FALL RISK ASSESSMENT  01/30/2024     ADVANCE CARE PLANNING  10/28/2026     COLORECTAL CANCER SCREENING  05/04/2031     DTAP/TDAP/TD IMMUNIZATION (3 - Td or Tdap)  "04/28/2032     HEPATITIS C SCREENING  Completed     PHQ-2 (once per calendar year)  Completed     INFLUENZA VACCINE  Completed     URINALYSIS  Completed     ZOSTER IMMUNIZATION  Completed     COVID-19 Vaccine  Completed     IPV IMMUNIZATION  Aged Out     MENINGITIS IMMUNIZATION  Aged Out     HIV SCREENING  Discontinued     PAP  Discontinued           Breast CA Risk Assessment (FHS-7) 1/25/2023   Do you have a family history of breast, colon, or ovarian cancer? No / Unknown           Pertinent mammograms are reviewed under the imaging tab.    Review of Systems   Constitutional: Negative for chills and fever.   HENT: Negative for congestion, ear pain, hearing loss and sore throat.    Eyes: Negative for pain.   Respiratory: Negative for cough and shortness of breath.    Cardiovascular: Negative for chest pain, palpitations and peripheral edema.   Gastrointestinal: Negative for abdominal pain, constipation, diarrhea, heartburn, hematochezia and nausea.   Breasts:  Negative for tenderness, breast mass and discharge.   Genitourinary: Negative for dysuria, frequency, genital sores, hematuria, pelvic pain, urgency, vaginal bleeding and vaginal discharge.   Musculoskeletal: Negative for arthralgias, joint swelling and myalgias.   Skin: Negative for rash.   Neurological: Negative for dizziness, headaches and paresthesias.   Psychiatric/Behavioral: Negative for mood changes. The patient is not nervous/anxious.          OBJECTIVE:   /78 (BP Location: Left arm, Patient Position: Sitting, Cuff Size: Adult Regular)   Pulse 100   Temp 97.8  F (36.6  C)   Ht 1.651 m (5' 5\")   Wt (!) 174.2 kg (384 lb)   LMP  (LMP Unknown)   SpO2 (!) 9%   BMI 63.90 kg/m   Estimated body mass index is 63.9 kg/m  as calculated from the following:    Height as of this encounter: 1.651 m (5' 5\").    Weight as of this encounter: 174.2 kg (384 lb).  Physical Exam  GENERAL: alert, no distress and obese  EYES: Eyes grossly normal to inspection, " PERRL and conjunctivae and sclerae normal  HENT: normal cephalic/atraumatic and ear canals and TM's normal  NECK: no adenopathy, no asymmetry, masses, or scars and thyroid normal to palpation  RESP: lungs clear to auscultation - no rales, rhonchi or wheezes  CV: tachycardia, normal S1 S2, no S3 or S4, no murmur, click or rub, peripheral pulses strong and no peripheral edema  ABDOMEN: soft, nontender and bowel sounds normal  MS: no gross musculoskeletal defects noted, no edema  SKIN: no suspicious lesions or rashes  NEURO: Normal strength and tone, mentation intact and speech normal  PSYCH: mentation appears normal, affect normal/bright  Diabetic foot exam: normal DP and PT pulses, no trophic changes or ulcerative lesions and normal monofilament exam        ASSESSMENT / PLAN:   1. Welcome to Medicare preventive visit  Counseled on healthcare directives today.  I encouraged her to complete this form.  Pneumococcal booster today.  I have urged more regular exercise.  She is quite immobile.  Bone density is urged.  - EKG 12-lead, tracing only  - DEXA HIP/PELVIS/SPINE - Future; Future    2. Type 1 diabetes mellitus with other specified complication (H)  Labs for monitoring.  Recent A1c was acceptable.  Eye exam is due  - Adult Eye  Referral; Future  - Comprehensive metabolic panel (BMP + Alb, Alk Phos, ALT, AST, Total. Bili, TP); Future  - UA Macro with Reflex to Micro and Culture - lab collect; Future  - Albumin Random Urine Quantitative with Creat Ratio; Future    3. GIOVANI (obstructive sleep apnea)  She is resistant to utilizing CPAP  - CBC with platelets; Future    4. Morbid obesity (H)  Ongoing efforts at activity urged    5. Hypothyroidism, unspecified type  Seems euthyroid.  Has not had a TSH in a while.  - TSH with free T4 reflex; Future    6. Stage 3a chronic kidney disease (H)  Labs for monitoring    7. Menopause  Baseline bone density today  - DEXA HIP/PELVIS/SPINE - Future; Future    8. Vitamin D  "deficiency    - Vitamin D Deficiency; Future    9. Visit for screening mammogram    - MA SCREENING DIGITAL BILAT - Future  (s+30); Future    Patient has been advised of split billing requirements and indicates understanding: Yes      COUNSELING:  Reviewed preventive health counseling, as reflected in patient instructions      BMI:   Estimated body mass index is 63.9 kg/m  as calculated from the following:    Height as of this encounter: 1.651 m (5' 5\").    Weight as of this encounter: 174.2 kg (384 lb).   Weight management plan: Discussed healthy diet and exercise guidelines      She reports that she has never smoked. She has never used smokeless tobacco.      Appropriate preventive services were discussed with this patient, including applicable screening as appropriate for cardiovascular disease, diabetes, osteopenia/osteoporosis, and glaucoma.  As appropriate for age/gender, discussed screening for colorectal cancer, prostate cancer, breast cancer, and cervical cancer. Checklist reviewing preventive services available has been given to the patient.    Reviewed patients plan of care and provided an AVS. The Complex Care Plan (for patients with higher acuity and needing more deliberate coordination of services) for Corinna meets the Care Plan requirement. This Care Plan has been established and reviewed with the Patient.      MAGALI JEAN-BAPTISTE MD  [facility]    Identified Health Risks:    The patient was provided with suggestions to help her develop a healthy physical lifestyle.  She is at risk for lack of exercise and has been provided with information to increase physical activity for the benefit of her well-being.  The patient was counseled and encouraged to consider modifying their diet and eating habits. She was provided with information on recommended healthy diet options.  "

## 2023-01-30 NOTE — PATIENT INSTRUCTIONS
Patient Education   Personalized Prevention Plan  You are due for the preventive services outlined below.  Your care team is available to assist you in scheduling these services.  If you have already completed any of these items, please share that information with your care team to update in your medical record.  Health Maintenance Due   Topic Date Due     Osteoporosis Screening  Never done     Mammogram  03/07/2020     Kidney Microalbumin Urine Test  10/15/2021     Annual Wellness Visit  10/28/2022     Diabetic Foot Exam  10/28/2022     ANNUAL REVIEW OF HM ORDERS  10/28/2022     Eye Exam  12/21/2022     Pneumococcal Vaccine (3 - PPSV23 if available, else PCV20) 09/20/2022     Hemoglobin  04/28/2023     Your Health Risk Assessment indicates you feel you are not in good health    A healthy lifestyle helps keep the body fit and the mind alert. It helps protect you from disease, helps you fight disease, and helps prevent chronic disease (disease that doesn't go away) from getting worse. This is important as you get older and begin to notice twinges in muscles and joints and a decline in the strength and stamina you once took for granted. A healthy lifestyle includes good healthcare, good nutrition, weight control, recreation, and regular exercise. Avoid harmful substances and do what you can to keep safe. Another part of a healthy lifestyle is stay mentally active and socially involved.    Good healthcare     Have a wellness visit every year.     If you have new symptoms, let us know right away. Don't wait until the next checkup.     Take medicines exactly as prescribed and keep your medicines in a safe place. Tell us if your medicine causes problems.   Healthy diet and weight control     Eat 3 or 4 small, nutritious, low-fat, high-fiber meals a day. Include a variety of fruits, vegetables, and whole-grain foods.     Make sure you get enough calcium in your diet. Calcium, vitamin D, and exercise help prevent  osteoporosis (bone thinning).     If you live alone, try eating with others when you can. That way you get a good meal and have company while you eat it.     Try to keep a healthy weight. If you eat more calories than your body uses for energy, it will be stored as fat and you will gain weight.     Recreation   Recreation is not limited to sports and team events. It includes any activity that provides relaxation, interest, enjoyment, and exercise. Recreation provides an outlet for physical, mental, and social energy. It can give a sense of worth and achievement. It can help you stay healthy.    Mental Exercise and Social Involvement  Mental and emotional health is as important as physical health. Keep in touch with friends and family. Stay as active as possible. Continue to learn and challenge yourself.   Things you can do to stay mentally active are:    Learn something new, like a foreign language or musical instrument.     Play SCRABBLE or do crossword puzzles. If you cannot find people to play these games with you at home, you can play them with others on your computer through the Internet.     Join a games club--anything from card games to chess or checkers or lawn bowling.     Start a new hobby.     Go back to school.     Volunteer.     Read.   Keep up with world events.    Exercise for a Healthier Heart  You may wonder how you can improve the health of your heart. If you re thinking about exercise, you re on the right track. You don t need to become an athlete. But you do need a certain amount of brisk exercise to help strengthen your heart. If you have been diagnosed with a heart condition, your healthcare provider may advise exercise to help stabilize your condition. To help make exercise a habit, choose safe, fun activities.      Exercise with a friend. When activity is fun, you're more likely to stick with it.   Before you start  Check with your healthcare provider before starting an exercise program. This  is especially important if you have not been active for a while. It's also important if you have a long-term (chronic) health problem such as heart disease, diabetes, or obesity. Or if you are at high risk for having these problems.   Why exercise?  Exercising regularly offers many healthy rewards. It can help you do all of the following:     Improve your blood cholesterol level to help prevent further heart trouble    Lower your blood pressure to help prevent a stroke or heart attack    Control diabetes, or reduce your risk of getting this disease    Improve your heart and lung function    Reach and stay at a healthy weight    Make your muscles stronger so you can stay active    Prevent falls and fractures by slowing the loss of bone mass (osteoporosis)    Manage stress better    Reduce your blood pressure    Improve your sense of self and your body image  Exercise tips      Ease into your routine. Set small goals. Then build on them. If you are not sure what your activity level should be, talk with your healthcare provider first before starting an exercise routine.    Exercise on most days. Aim for a total of 150 minutes (2 hours and 30 minutes) or more of moderate-intensity aerobic activity each week. Or 75 minutes (1 hour and 15 minutes) or more of vigorous-intensity aerobic activity each week. Or try for a combination of both. Moderate activity means that you breathe heavier and your heart rate increases but you can still talk. Think about doing 40 minutes of moderate exercise, 3 to 4 times a week. For best results, activity should last for about 40 minutes to lower blood pressure and cholesterol. It's OK to work up to the 40-minute period over time. Examples of moderate-intensity activity are walking 1 mile in 15 minutes. Or doing 30 to 45 minutes of yard work.    Step up your daily activity level.  Along with your exercise program, try being more active the whole day. Walk instead of drive. Or park further  away so that you take more steps each day. Do more household tasks or yard work. You may not be able to meet the advised mount of physical activity. But doing some moderate- or vigorous-intensity aerobic activity can help reduce your risk for heart disease. Your healthcare provider can help you figure out what is best for you.    Choose 1 or more activities you enjoy.  Walking is one of the easiest things you can do. You can also try swimming, riding a bike, dancing, or taking an exercise class.    When to call your healthcare provider  Call your healthcare provider if you have any of these:     Chest pain or feel dizzy or lightheaded    Burning, tightness, pressure, or heaviness in your chest, neck, shoulders, back, or arms    Abnormal shortness of breath    More joint or muscle pain    A very fast or irregular heartbeat (palpitations)  [employer] last reviewed this educational content on 7/1/2019 2000-2021 [employer]. All rights reserved. This information is not intended as a substitute for professional medical care. Always follow your healthcare professional's instructions.          Understanding USDA MyPlate  The USDA has guidelines to help you make healthy food choices. These are called MyPlate. MyPlate shows the food groups that make up healthy meals using the image of a place setting. Before you eat, think about the healthiest choices for what to put on your plate or in your cup or bowl. To learn more about building a healthy plate, visit www.choosemyplate.gov.    The food groups    Fruits. Any fruit or 100% fruit juice counts as part of the Fruit Group. Fruits may be fresh, canned, frozen, or dried, and may be whole, cut-up, or pureed. Make 1/2 of your plate fruits and vegetables.    Vegetables. Any vegetable or 100% vegetable juice counts as a member of the Vegetable Group. Vegetables may be fresh, frozen, canned, or dried. They can be served raw or cooked and may be whole, cut-up, or  mashed. Make 1/2 of your plate fruits and vegetables.    Grains. All foods made from grains are part of the Grains Group. These include wheat, rice, oats, cornmeal, and barley. Grains are often used to make foods such as bread, pasta, oatmeal, cereal, tortillas, and grits. Grains should be no more than 1/4 of your plate. At least half of your grains should be whole grains.    Protein. This group includes meat, poultry, seafood, beans and peas, eggs, processed soy products (such as tofu), nuts (including nut butters), and seeds. Make protein choices no more than 1/4 of your plate. Meat and poultry choices should be lean or low fat.    Dairy. The Dairy Group includes all fluid milk products and foods made from milk that contain calcium, such as yogurt and cheese. (Foods that have little calcium, such as cream, butter, and cream cheese, are not part of this group.) Most dairy choices should be low-fat or fat-free.    Oils. Oils aren't a food group, but they do contain essential nutrients. However it's important to watch your intake of oils. These are fats that are liquid at room temperature. They include canola, corn, olive, soybean, vegetable, and sunflower oil. Foods that are mainly oil include mayonnaise, certain salad dressings, and soft margarines. You likely already get your daily oil allowance from the foods you eat.  Things to limit  Eating healthy also means limiting these things in your diet:       Salt (sodium). Many processed foods have a lot of sodium. To keep sodium intake down, eat fresh vegetables, meats, poultry, and seafood when possible. Purchase low-sodium, reduced-sodium, or no-salt-added food products at the store. And don't add salt to your meals at home. Instead, season them with herbs and spices such as dill, oregano, cumin, and paprika. Or try adding flavor with lemon or lime zest and juice.    Saturated fat. Saturated fats are most often found in animal products such as beef, pork, and  chicken. They are often solid at room temperature, such as butter. To reduce your saturated fat intake, choose leaner cuts of meat and poultry. And try healthier cooking methods such as grilling, broiling, roasting, or baking. For a simple lower-fat swap, use plain nonfat yogurt instead of mayonnaise when making potato salad or macaroni salad.    Added sugars. These are sugars added to foods. They are in foods such as ice cream, candy, soda, fruit drinks, sports drinks, energy drinks, cookies, pastries, jams, and syrups. Cut down on added sugars by sharing sweet treats with a family member or friend. You can also choose fruit for dessert, and drink water or other unsweetened beverages.     [employer] last reviewed this educational content on 6/1/2020 2000-2021 [employer]. All rights reserved. This information is not intended as a substitute for professional medical care. Always follow your healthcare professional's instructions.

## 2023-02-01 LAB — BACTERIA UR CULT: NORMAL

## 2023-02-13 ENCOUNTER — TELEPHONE (OUTPATIENT)
Dept: INTERNAL MEDICINE | Facility: CLINIC | Age: 68
End: 2023-02-13
Payer: MEDICARE

## 2023-02-13 DIAGNOSIS — Z79.4 TYPE 2 DIABETES MELLITUS WITH MICROALBUMINURIA, WITH LONG-TERM CURRENT USE OF INSULIN (H): ICD-10-CM

## 2023-02-13 DIAGNOSIS — R80.9 TYPE 2 DIABETES MELLITUS WITH MICROALBUMINURIA, WITH LONG-TERM CURRENT USE OF INSULIN (H): ICD-10-CM

## 2023-02-13 DIAGNOSIS — E11.29 TYPE 2 DIABETES MELLITUS WITH MICROALBUMINURIA, WITH LONG-TERM CURRENT USE OF INSULIN (H): ICD-10-CM

## 2023-02-13 RX ORDER — FLASH GLUCOSE SENSOR
KIT MISCELLANEOUS
Qty: 2 EACH | Refills: 5 | Status: SHIPPED | OUTPATIENT
Start: 2023-02-13 | End: 2023-07-06

## 2023-02-13 NOTE — TELEPHONE ENCOUNTER
Rx for Waldemar 14 day sensors updated from #6 + 1 refill to  #2 + 5 refills, per medicare compliance guidelines, per prescription modification CPA.  Updated Rx sent to pharmacy and order in Epic updated.

## 2023-03-31 DIAGNOSIS — Z79.4 TYPE 2 DIABETES MELLITUS WITH COMPLICATION, WITH LONG-TERM CURRENT USE OF INSULIN (H): ICD-10-CM

## 2023-03-31 DIAGNOSIS — E11.8 TYPE 2 DIABETES MELLITUS WITH COMPLICATION, WITH LONG-TERM CURRENT USE OF INSULIN (H): ICD-10-CM

## 2023-03-31 RX ORDER — ATORVASTATIN CALCIUM 20 MG/1
TABLET, FILM COATED ORAL
Qty: 90 TABLET | Refills: 3 | Status: SHIPPED | OUTPATIENT
Start: 2023-03-31 | End: 2024-03-18

## 2023-03-31 NOTE — TELEPHONE ENCOUNTER
"Last Written Prescription Date:  11/28/22  Last Fill Quantity: 90,  # refills: 0   Last office visit provider:  1/30/23     Requested Prescriptions   Pending Prescriptions Disp Refills     atorvastatin (LIPITOR) 20 MG tablet [Pharmacy Med Name: ATORVASTATIN 20MG TABLETS] 90 tablet 0     Sig: TAKE 1 TABLET(20 MG) BY MOUTH DAILY       Statins Protocol Passed - 3/31/2023  9:40 AM        Passed - LDL on file in past 12 months     Recent Labs   Lab Test 01/02/23  0805   LDL 74             Passed - No abnormal creatine kinase in past 12 months     No lab results found.             Passed - Recent (12 mo) or future (30 days) visit within the authorizing provider's specialty     Patient has had an office visit with the authorizing provider or a provider within the authorizing providers department within the previous 12 mos or has a future within next 30 days. See \"Patient Info\" tab in inbasket, or \"Choose Columns\" in Meds & Orders section of the refill encounter.              Passed - Medication is active on med list        Passed - Patient is age 18 or older        Passed - No active pregnancy on record        Passed - No positive pregnancy test in past 12 months             GIORGI BLANKENSHIP RN 03/31/23 3:26 PM  "

## 2023-04-13 DIAGNOSIS — K21.9 GASTROESOPHAGEAL REFLUX DISEASE, UNSPECIFIED WHETHER ESOPHAGITIS PRESENT: ICD-10-CM

## 2023-04-14 RX ORDER — PANTOPRAZOLE SODIUM 40 MG/1
40 TABLET, DELAYED RELEASE ORAL DAILY
Qty: 90 TABLET | Refills: 2 | Status: SHIPPED | OUTPATIENT
Start: 2023-04-14 | End: 2023-12-18

## 2023-04-14 NOTE — TELEPHONE ENCOUNTER
"Prescription approved per [facility] Refill Protocol.    Last Written Prescription Date:  10/23/22 - Dr. Melton  Last Fill Quantity: 90,  # refills: 1   Last office visit provider:  1/30/2023 - Welcome to Medicare (Dr. Melton)     Requested Prescriptions   Pending Prescriptions Disp Refills     pantoprazole (PROTONIX) 40 MG EC tablet 90 tablet 1     Sig: Take 1 tablet (40 mg) by mouth daily       PPI Protocol Passed - 4/13/2023  2:49 PM        Passed - Not on Clopidogrel (unless Pantoprazole ordered)        Passed - No diagnosis of osteoporosis on record        Passed - Recent (12 mo) or future (30 days) visit within the authorizing provider's specialty     Patient has had an office visit with the authorizing provider or a provider within the authorizing providers department within the previous 12 mos or has a future within next 30 days. See \"Patient Info\" tab in inbasket, or \"Choose Columns\" in Meds & Orders section of the refill encounter.              Passed - Medication is active on med list        Passed - Patient is age 18 or older        Passed - No active pregnacy on record        Passed - No positive pregnancy test in past 12 months             Sydnee Mc RN 04/14/23 2:42 PM  "
Pt to meet >75% estimated nutrition needs consistently.

## 2023-05-05 DIAGNOSIS — G47.33 OSA (OBSTRUCTIVE SLEEP APNEA): ICD-10-CM

## 2023-05-06 DIAGNOSIS — E10.69 TYPE 1 DIABETES MELLITUS WITH OTHER SPECIFIED COMPLICATION (H): ICD-10-CM

## 2023-05-06 RX ORDER — TRAZODONE HYDROCHLORIDE 100 MG/1
TABLET ORAL
Qty: 90 TABLET | Refills: 2 | Status: SHIPPED | OUTPATIENT
Start: 2023-05-06 | End: 2023-12-18

## 2023-05-06 NOTE — TELEPHONE ENCOUNTER
"Last Written Prescription Date:  5/7/2022  Last Fill Quantity: 90,  # refills: 3   Last office visit provider:  1/30/2023     Requested Prescriptions   Pending Prescriptions Disp Refills     traZODone (DESYREL) 100 MG tablet 90 tablet 3     Sig: TAKE 1 TABLET BY MOUTH AT BEDTIME AS NEEDED FOR SLEEP       Serotonin Modulators Passed - 5/5/2023 10:43 AM        Passed - Recent (12 mo) or future (30 days) visit within the authorizing provider's specialty     Patient has had an office visit with the authorizing provider or a provider within the authorizing providers department within the previous 12 mos or has a future within next 30 days. See \"Patient Info\" tab in inbasket, or \"Choose Columns\" in Meds & Orders section of the refill encounter.              Passed - Medication is active on med list        Passed - Patient is age 18 or older        Passed - No active pregnancy on record        Passed - No positive pregnancy test in past 12 months             Katie Denney RN 05/06/23 7:18 AM  "

## 2023-05-08 RX ORDER — INSULIN GLARGINE 100 [IU]/ML
INJECTION, SOLUTION SUBCUTANEOUS
Qty: 105 ML | Refills: 0 | Status: SHIPPED | OUTPATIENT
Start: 2023-05-08 | End: 2023-07-14

## 2023-05-08 NOTE — TELEPHONE ENCOUNTER
"Requested Prescriptions   Pending Prescriptions Disp Refills     insulin glargine (LANTUS SOLOSTAR) 100 UNIT/ML pen [Pharmacy Med Name: LANTUS SOLOSTAR PEN INJ 3ML] 105 mL 0     Sig: INJECT 65 UNITS SUBCUTANEOUSLY EVERY MORNING AND 55 UNITS EVERY EVENING.       Long Acting Insulin Protocol Passed - 5/6/2023 11:20 AM        Passed - Serum creatinine on file in past 12 months     Recent Labs   Lab Test 01/30/23  0914   CR 1.12*       Ok to refill medication if creatinine is low          Passed - HgbA1C in past 3 or 6 months     If HgbA1C is 8 or greater, it needs to be on file within the past 3 months.  If less than 8, must be on file within the past 6 months.     Recent Labs   Lab Test 01/02/23  0805   A1C 7.3*             Passed - Medication is active on med list        Passed - Patient is age 18 or older        Passed - Recent (6 mo) or future (30 days) visit within the authorizing provider's specialty     Patient had office visit in the last 6 months or has a visit in the next 30 days with authorizing provider or within the authorizing provider's specialty.  See \"Patient Info\" tab in inbasket, or \"Choose Columns\" in Meds & Orders section of the refill encounter.                 "

## 2023-06-05 ENCOUNTER — MYC MEDICAL ADVICE (OUTPATIENT)
Dept: INTERNAL MEDICINE | Facility: CLINIC | Age: 68
End: 2023-06-05
Payer: MEDICARE

## 2023-06-05 DIAGNOSIS — E55.9 VITAMIN D DEFICIENCY: ICD-10-CM

## 2023-06-05 RX ORDER — MULTIVITAMIN WITH FOLIC ACID 400 MCG
1 TABLET ORAL DAILY
Qty: 90 TABLET | Refills: 2 | Status: SHIPPED | OUTPATIENT
Start: 2023-06-05 | End: 2023-12-18

## 2023-07-03 ENCOUNTER — LAB (OUTPATIENT)
Dept: LAB | Facility: CLINIC | Age: 68
End: 2023-07-03
Payer: MEDICARE

## 2023-07-03 DIAGNOSIS — E10.69 TYPE 1 DIABETES MELLITUS WITH OTHER SPECIFIED COMPLICATION (H): ICD-10-CM

## 2023-07-03 LAB
ANION GAP SERPL CALCULATED.3IONS-SCNC: 10 MMOL/L (ref 7–15)
BUN SERPL-MCNC: 17.9 MG/DL (ref 8–23)
CALCIUM SERPL-MCNC: 9.7 MG/DL (ref 8.8–10.2)
CHLORIDE SERPL-SCNC: 103 MMOL/L (ref 98–107)
CREAT SERPL-MCNC: 0.98 MG/DL (ref 0.51–0.95)
DEPRECATED HCO3 PLAS-SCNC: 25 MMOL/L (ref 22–29)
GFR SERPL CREATININE-BSD FRML MDRD: 64 ML/MIN/1.73M2
GLUCOSE SERPL-MCNC: 115 MG/DL (ref 70–99)
HBA1C MFR BLD: 7.1 % (ref 0–5.6)
POTASSIUM SERPL-SCNC: 4.1 MMOL/L (ref 3.4–5.3)
SODIUM SERPL-SCNC: 138 MMOL/L (ref 136–145)
TSH SERPL DL<=0.005 MIU/L-ACNC: 2.32 UIU/ML (ref 0.3–4.2)

## 2023-07-03 PROCEDURE — 36415 COLL VENOUS BLD VENIPUNCTURE: CPT

## 2023-07-03 PROCEDURE — 83036 HEMOGLOBIN GLYCOSYLATED A1C: CPT

## 2023-07-03 PROCEDURE — 84443 ASSAY THYROID STIM HORMONE: CPT

## 2023-07-03 PROCEDURE — 80048 BASIC METABOLIC PNL TOTAL CA: CPT

## 2023-07-05 DIAGNOSIS — Z79.4 TYPE 2 DIABETES MELLITUS WITH MICROALBUMINURIA, WITH LONG-TERM CURRENT USE OF INSULIN (H): ICD-10-CM

## 2023-07-05 DIAGNOSIS — E11.29 TYPE 2 DIABETES MELLITUS WITH MICROALBUMINURIA, WITH LONG-TERM CURRENT USE OF INSULIN (H): ICD-10-CM

## 2023-07-05 DIAGNOSIS — R80.9 TYPE 2 DIABETES MELLITUS WITH MICROALBUMINURIA, WITH LONG-TERM CURRENT USE OF INSULIN (H): ICD-10-CM

## 2023-07-06 ENCOUNTER — OFFICE VISIT (OUTPATIENT)
Dept: INTERNAL MEDICINE | Facility: CLINIC | Age: 68
End: 2023-07-06
Payer: MEDICARE

## 2023-07-06 VITALS
OXYGEN SATURATION: 97 % | TEMPERATURE: 98.1 F | HEART RATE: 100 BPM | HEIGHT: 65 IN | RESPIRATION RATE: 20 BRPM | DIASTOLIC BLOOD PRESSURE: 80 MMHG | SYSTOLIC BLOOD PRESSURE: 124 MMHG | BODY MASS INDEX: 48.82 KG/M2 | WEIGHT: 293 LBS

## 2023-07-06 DIAGNOSIS — E03.9 HYPOTHYROIDISM, UNSPECIFIED TYPE: ICD-10-CM

## 2023-07-06 DIAGNOSIS — Z79.4 TYPE 2 DIABETES MELLITUS WITH MICROALBUMINURIA, WITH LONG-TERM CURRENT USE OF INSULIN (H): ICD-10-CM

## 2023-07-06 DIAGNOSIS — N18.31 TYPE 1 DIABETES MELLITUS WITH STAGE 3A CHRONIC KIDNEY DISEASE (H): Primary | ICD-10-CM

## 2023-07-06 DIAGNOSIS — E10.22 TYPE 1 DIABETES MELLITUS WITH STAGE 3A CHRONIC KIDNEY DISEASE (H): Primary | ICD-10-CM

## 2023-07-06 DIAGNOSIS — E11.29 TYPE 2 DIABETES MELLITUS WITH MICROALBUMINURIA, WITH LONG-TERM CURRENT USE OF INSULIN (H): ICD-10-CM

## 2023-07-06 DIAGNOSIS — E66.01 MORBID OBESITY (H): ICD-10-CM

## 2023-07-06 DIAGNOSIS — R80.9 TYPE 2 DIABETES MELLITUS WITH MICROALBUMINURIA, WITH LONG-TERM CURRENT USE OF INSULIN (H): ICD-10-CM

## 2023-07-06 DIAGNOSIS — G47.33 OSA (OBSTRUCTIVE SLEEP APNEA): ICD-10-CM

## 2023-07-06 DIAGNOSIS — E11.21 MICROALBUMINURIC DIABETIC NEPHROPATHY (H): ICD-10-CM

## 2023-07-06 DIAGNOSIS — N18.31 STAGE 3A CHRONIC KIDNEY DISEASE (H): ICD-10-CM

## 2023-07-06 DIAGNOSIS — R06.09 DOE (DYSPNEA ON EXERTION): ICD-10-CM

## 2023-07-06 PROCEDURE — 99214 OFFICE O/P EST MOD 30 MIN: CPT | Performed by: INTERNAL MEDICINE

## 2023-07-06 RX ORDER — FLASH GLUCOSE SENSOR
KIT MISCELLANEOUS
Qty: 2 EACH | Refills: 5 | Status: SHIPPED | OUTPATIENT
Start: 2023-07-06 | End: 2024-01-10

## 2023-07-06 RX ORDER — FLASH GLUCOSE SENSOR
KIT MISCELLANEOUS
Refills: 5 | OUTPATIENT
Start: 2023-07-06

## 2023-07-06 NOTE — TELEPHONE ENCOUNTER
Routing refill request to provider for review/approval because:  Drug not on the [facility] refill protocol     Last Written Prescription Date:  2/13/2023  Last Fill Quantity: 2,  # refills: 5   Last office visit provider:  1/30/2023     Requested Prescriptions   Pending Prescriptions Disp Refills     Continuous Blood Gluc Sensor (FREESTYLE [place] 14 DAY SENSOR) MISC [Pharmacy Med Name: FREESTYLE [place] 14 DAY SENSO  MISC]  5     Sig: CHANGE EVERY 14 DAYS       There is no refill protocol information for this order          Janelle Puente RN 07/05/23 8:18 PM

## 2023-07-06 NOTE — PROGRESS NOTES
1. Type 1 diabetes mellitus with stage 3a chronic kidney disease (H)  Recent A1c looked excellent.  She requires insulin several times a day and requires continuous glucose monitor to keep her sugars under good control.  Continue same.  Follow-up with her endocrinologist soon as planned.   - Adult Eye [facility] Referral; Future    2. GIOVANI (obstructive sleep apnea)  She refuses treatment of this.    3. FERNANDEZ (dyspnea on exertion)  Chronic and stable likely due to her morbid obesity and deconditioning.  I did urged her to be as active as possible.    4. Morbid obesity (H)  As above.    5. Microalbuminuric diabetic nephropathy (H)  Continue ACE inhibitor.  Continue good sugar control.    6. Hypothyroidism, unspecified type  Recent TSH is normal.    7. Stage 3a chronic kidney disease (H)  Creatinine stable.  Continue ACE.    Subjective   Corinna is a 67 year old, presenting for the following health issues:  Diabetes (Needs refill for sensor (is scheduled to see Endo on 7/12) )        7/6/2023    10:48 AM   Additional Questions   Roomed by Fabiola     History of Present Illness       Reason for visit:  Shortness if breath with exertion    She eats 2-3 servings of fruits and vegetables daily.She consumes 1 sweetened beverage(s) daily.She exercises with enough effort to increase her heart rate 10 to 19 minutes per day.  She exercises with enough effort to increase her heart rate 3 or less days per week.   She is taking medications regularly.         Corinna comes in today for follow-up.  She is morbidly obese with a BMI over 60.  She has insulin-dependent diabetes.  She requires insulin C-peptide is undetectable.  She tells me that her and her  may be moving [place] when he retires.  He is 5 years younger.  She has chronic dyspnea on exertion due to her morbid obesity.  No change in that.  No chest pains.  She has sleep apnea which is untreated due to refusal to utilize CPAP.  She denies any other new concerns for me  "today.      Review of Systems         Objective    /80 (BP Location: Left arm, Patient Position: Sitting, Cuff Size: Adult Large)   Pulse 100   Temp 98.1  F (36.7  C) (Tympanic)   Resp 20   Ht 1.651 m (5' 5\")   Wt (!) 173.3 kg (382 lb)   LMP  (LMP Unknown)   SpO2 97%   BMI 63.57 kg/m    Body mass index is 63.57 kg/m .  Physical Exam   Morbidly obese woman.  Pleasant and in no distress.  Vitals noted                    "

## 2023-07-10 DIAGNOSIS — E55.9 VITAMIN D DEFICIENCY: ICD-10-CM

## 2023-07-10 RX ORDER — VITAMIN B COMPLEX
2 TABLET ORAL DAILY
Qty: 180 TABLET | Refills: 3 | Status: SHIPPED | OUTPATIENT
Start: 2023-07-10 | End: 2024-07-01

## 2023-07-11 NOTE — TELEPHONE ENCOUNTER
"Last Written Prescription Date:  5/16/2022  Last Fill Quantity: 180,  # refills: 3   Last office visit provider:  7/6/2023     Requested Prescriptions   Pending Prescriptions Disp Refills     vitamin D3 25 mcg (1000 units) tablet 180 tablet 3     Sig: Take 2 tablets (50 mcg) by mouth daily       Vitamin Supplements (Adult) Protocol Passed - 7/10/2023  5:29 PM        Passed - High dose Vitamin D not ordered        Passed - Recent (12 mo) or future (30 days) visit within the authorizing provider's specialty     Patient has had an office visit with the authorizing provider or a provider within the authorizing providers department within the previous 12 mos or has a future within next 30 days. See \"Patient Info\" tab in inbasket, or \"Choose Columns\" in Meds & Orders section of the refill encounter.              Passed - Medication is active on med list             Ling Michaud RN 07/10/23 10:58 PM  "

## 2023-07-12 ENCOUNTER — TELEPHONE (OUTPATIENT)
Dept: ENDOCRINOLOGY | Facility: CLINIC | Age: 68
End: 2023-07-12

## 2023-07-12 ENCOUNTER — OFFICE VISIT (OUTPATIENT)
Dept: ENDOCRINOLOGY | Facility: CLINIC | Age: 68
End: 2023-07-12
Payer: MEDICARE

## 2023-07-12 VITALS
SYSTOLIC BLOOD PRESSURE: 110 MMHG | BODY MASS INDEX: 63.78 KG/M2 | WEIGHT: 293 LBS | HEART RATE: 101 BPM | OXYGEN SATURATION: 96 % | DIASTOLIC BLOOD PRESSURE: 80 MMHG

## 2023-07-12 DIAGNOSIS — N18.31 TYPE 1 DIABETES MELLITUS WITH STAGE 3A CHRONIC KIDNEY DISEASE (H): Primary | ICD-10-CM

## 2023-07-12 DIAGNOSIS — E10.22 TYPE 1 DIABETES MELLITUS WITH STAGE 3A CHRONIC KIDNEY DISEASE (H): Primary | ICD-10-CM

## 2023-07-12 PROCEDURE — 99214 OFFICE O/P EST MOD 30 MIN: CPT | Performed by: NURSE PRACTITIONER

## 2023-07-12 PROCEDURE — 95251 CONT GLUC MNTR ANALYSIS I&R: CPT | Performed by: NURSE PRACTITIONER

## 2023-07-12 NOTE — LETTER
7/12/2023         RE: Corinna Farias  [address]  [place] [address]        Dear Colleague,    Thank you for referring your patient, Corinna Farias, to the [facility]. Please see a copy of my visit note below.    [facility] ENDOCRINOLOGY    Diabetes Note 7/12/2023    Corinna Farias, 1955, [id number]          Reason for visit      1. Type 1 diabetes mellitus with stage 3a chronic kidney disease (H)        HPI     Corinna Farias is a very pleasant 67 year old old female who presents for follow up.  SUMMARY:    Corinna returns today in f/u for DM 1.  Current A1c is alex at 7.1, down from her previous. She continues to take Lantus, 68 units in the morning and 55 units in the evening. She takes prandial insulin with her meals, up to 100 units daily.     She is using the [employer] 14 day CGM, which was downloaded and data was reviewed. TIR was 60% and GMI of 7.3.  She did have a couple of drops noted both on 7/6 and 7/9.  Pt did not require any outside assistance.     She denies any current problems with her feet. Her PCP referred for eye care, but she has not yet scheduled because she does not know what her coverage is through her 's new insurance.         Blood glucose data:      Past Medical History     Patient Active Problem List   Diagnosis     Bariatric surgery status (GASTRIC BYPASS)     Vitamin D deficiency     Essential hypertension, benign     GERD (gastroesophageal reflux disease)     Hypothyroidism     Microalbuminuric diabetic nephropathy (H)     Morbid obesity (H)     GIOVANI (obstructive sleep apnea)     Chronic kidney disease, stage 3     Cervical cancer screening     Encounter for long-term (current) use of insulin (H)     Female climacteric state     Insomnia     Pseudophakia     Type 1 diabetes mellitus (H)        Family History       family history includes Diabetes in her father and mother.    Social History      reports that she has never  smoked. She has never used smokeless tobacco. She reports that she does not drink alcohol and does not use drugs.      Review of Systems     Patient has no polyuria or polydipsia, no chest pain, dyspnea or TIA's, no numbness, tingling or pain in extremities  Remainder negative except as noted in HPI.    Answers for HPI/ROS submitted by the patient on 7/10/2023  General Symptoms: No  Skin Symptoms: No  HENT Symptoms: No  EYE SYMPTOMS: No  HEART SYMPTOMS: No  LUNG SYMPTOMS: No  INTESTINAL SYMPTOMS: No  URINARY SYMPTOMS: No  GYNECOLOGIC SYMPTOMS: No  BREAST SYMPTOMS: No  SKELETAL SYMPTOMS: No  BLOOD SYMPTOMS: No  NERVOUS SYSTEM SYMPTOMS: No  MENTAL HEALTH SYMPTOMS: No      Vital Signs     /80 (BP Location: Right arm, Patient Position: Sitting, Cuff Size: Adult Large)   Pulse 101   Wt (!) 173.9 kg (383 lb 4.8 oz)   LMP  (LMP Unknown)   SpO2 96%   BMI 63.78 kg/m    Wt Readings from Last 3 Encounters:   07/12/23 (!) 173.9 kg (383 lb 4.8 oz)   07/06/23 (!) 173.3 kg (382 lb)   01/30/23 (!) 174.2 kg (384 lb)       Physical Exam     Constitutional:  Well developed, Well nourished, obese  HENT:  Normocephalic,   Neck: normal in appearance  Eyes:  PERRL, Conjunctiva pink  Respiratory:  No respiratory distress  Skin: No acanthosis nigricans, lipoatrophy or lipodystrophy  Neurologic:  Alert & oriented x 3, nonfocal  Psychiatric:  Affect, Mood, Insight appropriate          Assessment     1. Type 1 diabetes mellitus with stage 3a chronic kidney disease (H)        Plan       Corinna's control has improved. No changes to her medication regimen warranted. We will continue with current dosing and she will f/u with me in 6 months.         Rossy Baig NP   Endocrinology  7/12/2023  11:45 AM          Lab Results     Microalbumin Urine mg/dL   Date Value Ref Range Status   10/15/2020 6.40 (H) 0.00 - 1.99 mg/dL Final       Cholesterol   Date Value Ref Range Status   01/02/2023 146 <200 mg/dL Final     Direct Measure HDL    Date Value Ref Range Status   01/02/2023 50 >=50 mg/dL Final     Triglycerides   Date Value Ref Range Status   01/02/2023 108 <150 mg/dL Final       [unfilled]      Current Medications     Outpatient Medications Prior to Visit   Medication Sig Dispense Refill     ASPIRIN PO Take 81 mg by mouth daily       atorvastatin (LIPITOR) 20 MG tablet TAKE 1 TABLET(20 MG) BY MOUTH DAILY 90 tablet 3     Continuous Blood Gluc Sensor (FREESTYLE [place] 14 DAY SENSOR) MISC CHANGE SENSOR EVERY 14 DAYS AS DIRECTED 2 each 5     insulin glargine (LANTUS SOLOSTAR) 100 UNIT/ML pen INJECT 65 UNITS SUBCUTANEOUSLY EVERY MORNING AND 55 UNITS EVERY EVENING. 105 mL 0     insulin lispro (HUMALOG KWIKPEN) 100 UNIT/ML (1 unit dial) KWIKPEN Use three times daily with meals, up to 100 units daily 75 mL 3     levothyroxine (SYNTHROID/LEVOTHROID) 75 MCG tablet TAKE ONE TABLET AT LEAST ONE HOUR BEFORE OR TWO HOURS AFTER A MEAL 90 tablet 2     lisinopril (ZESTRIL) 20 MG tablet TAKE 1 TABLET(20 MG) BY MOUTH DAILY 90 tablet 2     Multiple Vitamin (DAILY-MICHAEL MULTIVITAMIN) TABS Take 1 tablet by mouth daily 90 tablet 2     pantoprazole (PROTONIX) 40 MG EC tablet Take 1 tablet (40 mg) by mouth daily 90 tablet 2     traZODone (DESYREL) 100 MG tablet TAKE 1 TABLET BY MOUTH AT BEDTIME AS NEEDED FOR SLEEP 90 tablet 2     vitamin D3 25 mcg (1000 units) tablet Take 2 tablets (50 mcg) by mouth daily 180 tablet 3     No facility-administered medications prior to visit.                   Again, thank you for allowing me to participate in the care of your patient.        Sincerely,        Rossy Baig NP

## 2023-07-12 NOTE — TELEPHONE ENCOUNTER
Called pt, and lvm to call back at scheduling line to schedule a follow up appointment with Kamille in 6 mo with one week lab prior to appointment.

## 2023-07-12 NOTE — PROGRESS NOTES
[facility] ENDOCRINOLOGY    Diabetes Note 7/12/2023    Corinna Farias, 1955, [id number]          Reason for visit      1. Type 1 diabetes mellitus with stage 3a chronic kidney disease (H)        HPI     Corinna Farias is a very pleasant 67 year old old female who presents for follow up.  SUMMARY:    Corinna returns today in f/u for DM 1.  Current A1c is alex at 7.1, down from her previous. She continues to take Lantus, 68 units in the morning and 55 units in the evening. She takes prandial insulin with her meals, up to 100 units daily.     She is using the [employer] 14 day CGM, which was downloaded and data was reviewed. TIR was 60% and GMI of 7.3.  She did have a couple of drops noted both on 7/6 and 7/9.  Pt did not require any outside assistance.     She denies any current problems with her feet. Her PCP referred for eye care, but she has not yet scheduled because she does not know what her coverage is through her 's new insurance.         Blood glucose data:      Past Medical History     Patient Active Problem List   Diagnosis     Bariatric surgery status (GASTRIC BYPASS)     Vitamin D deficiency     Essential hypertension, benign     GERD (gastroesophageal reflux disease)     Hypothyroidism     Microalbuminuric diabetic nephropathy (H)     Morbid obesity (H)     GIOVANI (obstructive sleep apnea)     Chronic kidney disease, stage 3     Cervical cancer screening     Encounter for long-term (current) use of insulin (H)     Female climacteric state     Insomnia     Pseudophakia     Type 1 diabetes mellitus (H)        Family History       family history includes Diabetes in her father and mother.    Social History      reports that she has never smoked. She has never used smokeless tobacco. She reports that she does not drink alcohol and does not use drugs.      Review of Systems     Patient has no polyuria or polydipsia, no chest pain, dyspnea or TIA's, no numbness, tingling or pain in  extremities  Remainder negative except as noted in HPI.    Answers for HPI/ROS submitted by the patient on 7/10/2023  General Symptoms: No  Skin Symptoms: No  HENT Symptoms: No  EYE SYMPTOMS: No  HEART SYMPTOMS: No  LUNG SYMPTOMS: No  INTESTINAL SYMPTOMS: No  URINARY SYMPTOMS: No  GYNECOLOGIC SYMPTOMS: No  BREAST SYMPTOMS: No  SKELETAL SYMPTOMS: No  BLOOD SYMPTOMS: No  NERVOUS SYSTEM SYMPTOMS: No  MENTAL HEALTH SYMPTOMS: No      Vital Signs     /80 (BP Location: Right arm, Patient Position: Sitting, Cuff Size: Adult Large)   Pulse 101   Wt (!) 173.9 kg (383 lb 4.8 oz)   LMP  (LMP Unknown)   SpO2 96%   BMI 63.78 kg/m    Wt Readings from Last 3 Encounters:   07/12/23 (!) 173.9 kg (383 lb 4.8 oz)   07/06/23 (!) 173.3 kg (382 lb)   01/30/23 (!) 174.2 kg (384 lb)       Physical Exam     Constitutional:  Well developed, Well nourished, obese  HENT:  Normocephalic,   Neck: normal in appearance  Eyes:  PERRL, Conjunctiva pink  Respiratory:  No respiratory distress  Skin: No acanthosis nigricans, lipoatrophy or lipodystrophy  Neurologic:  Alert & oriented x 3, nonfocal  Psychiatric:  Affect, Mood, Insight appropriate          Assessment     1. Type 1 diabetes mellitus with stage 3a chronic kidney disease (H)        Plan       Corinna's control has improved. No changes to her medication regimen warranted. We will continue with current dosing and she will f/u with me in 6 months.         Rossy Baig NP   Endocrinology  7/12/2023  11:45 AM          Lab Results     Microalbumin Urine mg/dL   Date Value Ref Range Status   10/15/2020 6.40 (H) 0.00 - 1.99 mg/dL Final       Cholesterol   Date Value Ref Range Status   01/02/2023 146 <200 mg/dL Final     Direct Measure HDL   Date Value Ref Range Status   01/02/2023 50 >=50 mg/dL Final     Triglycerides   Date Value Ref Range Status   01/02/2023 108 <150 mg/dL Final       [unfilled]      Current Medications     Outpatient Medications Prior to Visit   Medication Sig  Dispense Refill     ASPIRIN PO Take 81 mg by mouth daily       atorvastatin (LIPITOR) 20 MG tablet TAKE 1 TABLET(20 MG) BY MOUTH DAILY 90 tablet 3     Continuous Blood Gluc Sensor (FREESTYLE [place] 14 DAY SENSOR) MISC CHANGE SENSOR EVERY 14 DAYS AS DIRECTED 2 each 5     insulin glargine (LANTUS SOLOSTAR) 100 UNIT/ML pen INJECT 65 UNITS SUBCUTANEOUSLY EVERY MORNING AND 55 UNITS EVERY EVENING. 105 mL 0     insulin lispro (HUMALOG KWIKPEN) 100 UNIT/ML (1 unit dial) KWIKPEN Use three times daily with meals, up to 100 units daily 75 mL 3     levothyroxine (SYNTHROID/LEVOTHROID) 75 MCG tablet TAKE ONE TABLET AT LEAST ONE HOUR BEFORE OR TWO HOURS AFTER A MEAL 90 tablet 2     lisinopril (ZESTRIL) 20 MG tablet TAKE 1 TABLET(20 MG) BY MOUTH DAILY 90 tablet 2     Multiple Vitamin (DAILY-MICHAEL MULTIVITAMIN) TABS Take 1 tablet by mouth daily 90 tablet 2     pantoprazole (PROTONIX) 40 MG EC tablet Take 1 tablet (40 mg) by mouth daily 90 tablet 2     traZODone (DESYREL) 100 MG tablet TAKE 1 TABLET BY MOUTH AT BEDTIME AS NEEDED FOR SLEEP 90 tablet 2     vitamin D3 25 mcg (1000 units) tablet Take 2 tablets (50 mcg) by mouth daily 180 tablet 3     No facility-administered medications prior to visit.

## 2023-07-14 DIAGNOSIS — E10.69 TYPE 1 DIABETES MELLITUS WITH OTHER SPECIFIED COMPLICATION (H): ICD-10-CM

## 2023-07-14 RX ORDER — INSULIN GLARGINE 100 [IU]/ML
INJECTION, SOLUTION SUBCUTANEOUS
Qty: 105 ML | Refills: 1 | Status: SHIPPED | OUTPATIENT
Start: 2023-07-14 | End: 2023-11-07

## 2023-07-23 ENCOUNTER — HEALTH MAINTENANCE LETTER (OUTPATIENT)
Age: 68
End: 2023-07-23

## 2023-08-25 DIAGNOSIS — E10.69 TYPE 1 DIABETES MELLITUS WITH OTHER SPECIFIED COMPLICATION (H): ICD-10-CM

## 2023-08-25 RX ORDER — INSULIN GLARGINE 100 [IU]/ML
INJECTION, SOLUTION SUBCUTANEOUS
Qty: 105 ML | Refills: 1 | OUTPATIENT
Start: 2023-08-25

## 2023-08-25 NOTE — TELEPHONE ENCOUNTER
"Requested Prescriptions   Pending Prescriptions Disp Refills    insulin glargine (LANTUS SOLOSTAR) 100 UNIT/ML pen [Pharmacy Med Name: LANTUS SOLOSTAR PEN INJ 3ML] 105 mL 1     Sig: INJECT 68 UNITS UNDER THE SKIN EVERY MORNING AND 55 UNITS EVERY EVENING.       Long Acting Insulin Protocol Passed - 8/25/2023 11:41 AM        Passed - Serum creatinine on file in past 12 months     Recent Labs   Lab Test 07/03/23  0958   CR 0.98*       Ok to refill medication if creatinine is low          Passed - HgbA1C in past 3 or 6 months     If HgbA1C is 8 or greater, it needs to be on file within the past 3 months.  If less than 8, must be on file within the past 6 months.     Recent Labs   Lab Test 07/03/23  0958   A1C 7.1*             Passed - Medication is active on med list        Passed - Patient is age 18 or older        Passed - Recent (6 mo) or future (30 days) visit within the authorizing provider's specialty     Patient had office visit in the last 6 months or has a visit in the next 30 days with authorizing provider or within the authorizing provider's specialty.  See \"Patient Info\" tab in inbasket, or \"Choose Columns\" in Meds & Orders section of the refill encounter.                 "

## 2023-08-28 ENCOUNTER — MYC MEDICAL ADVICE (OUTPATIENT)
Dept: ENDOCRINOLOGY | Facility: CLINIC | Age: 68
End: 2023-08-28
Payer: MEDICARE

## 2023-09-06 ENCOUNTER — VIRTUAL VISIT (OUTPATIENT)
Dept: INTERNAL MEDICINE | Facility: CLINIC | Age: 68
End: 2023-09-06
Payer: MEDICARE

## 2023-09-06 DIAGNOSIS — D32.9 MENINGIOMA (H): ICD-10-CM

## 2023-09-06 DIAGNOSIS — N18.31 TYPE 1 DIABETES MELLITUS WITH STAGE 3A CHRONIC KIDNEY DISEASE (H): ICD-10-CM

## 2023-09-06 DIAGNOSIS — E10.22 TYPE 1 DIABETES MELLITUS WITH STAGE 3A CHRONIC KIDNEY DISEASE (H): ICD-10-CM

## 2023-09-06 DIAGNOSIS — G51.0 BELL'S PALSY: Primary | ICD-10-CM

## 2023-09-06 PROCEDURE — 99214 OFFICE O/P EST MOD 30 MIN: CPT | Mod: 95 | Performed by: INTERNAL MEDICINE

## 2023-09-06 NOTE — PROGRESS NOTES
Corinna is a 67 year old who is being evaluated via a billable video visit.      How would you like to obtain your AVS? MyChart  If the video visit is dropped, the invitation should be resent by: Text to cell phone: [phone]  Will anyone else be joining your video visit? No          1. Bell's palsy  We discussed the natural history of this condition.  She needs to see an eye doctor so I put an urgent referral.  She should be seeing an ophthalmologist for her diabetes as well anyway.  - Adult Eye  Referral; Future    2. Type 1 diabetes mellitus with stage 3a chronic kidney disease (H)  Sugars have come back down after the prednisone that was given to her caused hyperglycemia.  Continue to close follow-up with her endocrinology provider.    3. Meningioma (H)  Presumably unrelated and likely benign.  We will have her meet with neurosurgery however to get their recommendation for follow-up etc.  - Neurosurgery Referral; Future     Subjective   Corinna is a 67 year old, presenting for the following health issues:  Hospital F/U (ED at [facility] 8/27/2023. Pt thought she was having a stroke. Left side of face was going numb. Diagnosed with bells palsy. Was prescribed Prednisone and took for 5 days. Seems to be getting better. Would like to discuss MRI results.) and Recheck Medication      9/6/2023     4:15 PM   Additional Questions   Roomed by gagan johns   Accompanied by alone         9/6/2023     4:15 PM   Patient Reported Additional Medications   Patient reports taking the following new medications Prednisone       BETINA       Corinna joins me on a virtual visit for ER follow-up.  She was seen in the emergency room at [facility] for evaluation of left-sided facial weakness and numbness.  She was diagnosed with Bell's palsy.  She did have an MRI which showed (presumably) a incidental meningioma.  She was given prednisone and she has gotten better.  Her eye continues to droop and have difficulty closing.  She is not using  the lubricant that was given to her.  They did not set her up with an eye doctor.    Plan of care communicated with                  Review of Systems         Objective           Vitals:  No vitals were obtained today due to virtual visit.    Physical Exam   Mild facial droop without rash.  Speech normal.                Video-Visit Details    Type of service:  Video Visit   Video Start Time: 1647  Video End Time:1655    Originating Location (pt. Location): Home    Distant Location (provider location):  On-site  Platform used for Video Visit: [employer]

## 2023-09-19 DIAGNOSIS — I10 ESSENTIAL HYPERTENSION, BENIGN: ICD-10-CM

## 2023-09-19 RX ORDER — LISINOPRIL 20 MG/1
20 TABLET ORAL DAILY
Qty: 90 TABLET | Refills: 2 | OUTPATIENT
Start: 2023-09-19

## 2023-09-22 ENCOUNTER — TRANSFERRED RECORDS (OUTPATIENT)
Dept: HEALTH INFORMATION MANAGEMENT | Facility: CLINIC | Age: 68
End: 2023-09-22
Payer: MEDICARE

## 2023-09-22 LAB — RETINOPATHY: NEGATIVE

## 2023-09-29 ENCOUNTER — TELEPHONE (OUTPATIENT)
Dept: INTERNAL MEDICINE | Facility: CLINIC | Age: 68
End: 2023-09-29
Payer: MEDICARE

## 2023-09-29 DIAGNOSIS — I10 ESSENTIAL HYPERTENSION, BENIGN: ICD-10-CM

## 2023-09-29 NOTE — TELEPHONE ENCOUNTER
General Call      Reason for Call:  pt is calling on the refill status of her Lisinopril  Needing to be sure this go to [facility]    Please advise    What are your questions or concerns:  n/a    Date of last appointment with provider: n/a    Could we send this information to you in [facility] or would you prefer to receive a phone call?:   Patient would prefer a phone call   Okay to leave a detailed message?: Yes at Cell number on file:    Telephone Information:   Mobile [phone]

## 2023-10-02 RX ORDER — LISINOPRIL 20 MG/1
20 TABLET ORAL DAILY
Qty: 90 TABLET | Refills: 2 | Status: SHIPPED | OUTPATIENT
Start: 2023-10-02 | End: 2024-07-15

## 2023-10-12 ENCOUNTER — TELEPHONE (OUTPATIENT)
Dept: NEUROSURGERY | Facility: CLINIC | Age: 68
End: 2023-10-12
Payer: MEDICARE

## 2023-10-13 ENCOUNTER — OFFICE VISIT (OUTPATIENT)
Dept: NEUROSURGERY | Facility: CLINIC | Age: 68
End: 2023-10-13
Attending: INTERNAL MEDICINE
Payer: MEDICARE

## 2023-10-13 VITALS — SYSTOLIC BLOOD PRESSURE: 123 MMHG | DIASTOLIC BLOOD PRESSURE: 72 MMHG | OXYGEN SATURATION: 96 % | HEART RATE: 116 BPM

## 2023-10-13 DIAGNOSIS — D32.9 MENINGIOMA (H): ICD-10-CM

## 2023-10-13 PROCEDURE — 99203 OFFICE O/P NEW LOW 30 MIN: CPT | Performed by: SURGERY

## 2023-10-13 ASSESSMENT — PAIN SCALES - GENERAL: PAINLEVEL: NO PAIN (0)

## 2023-10-13 NOTE — NURSING NOTE
"Corinna Farias is a 68 year old female who presents for:  Chief Complaint   Patient presents with    Consult        Initial Vitals:  /72   Pulse 116   LMP  (LMP Unknown)   SpO2 96%  Estimated body mass index is 63.78 kg/m  as calculated from the following:    Height as of 7/6/23: 5' 5\" (1.651 m).    Weight as of 7/12/23: 383 lb 4.8 oz (173.9 kg).. There is no height or weight on file to calculate BSA. BP completed using cuff size: regular  No Pain (0)    Masood Menendez    "

## 2023-10-13 NOTE — PROGRESS NOTES
The patient is a 68-year-old female.  She went to [facility] emergency room with Bell's palsy.  She had an MRI of the head without contrast.  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  There is a head CT dated 8/30/2015 and I think I can see the meningioma on that scan although it was not dictated.  I would like to get an MRI of the head with contrast and a dictated comparison of meningioma size now compared to 8/30/2015.  If the tumor is growing I would probably recommend focused radiation.  The patient is satisfied with the plan.  Total time 20 minutes, more than 50% spent counseling and/or coordinating care.

## 2023-10-13 NOTE — LETTER
10/13/2023         RE: Corinna Farias  [address]  [place] [address]        Dear Colleague,    Thank you for referring your patient, Corinna Farias, to the [facility] SPINE AND NEUROSURGERY. Please see a copy of my visit note below.    The patient is a 68-year-old female.  She went to [facility] emergency room with Bell's palsy.  She had an MRI of the head without contrast.  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  There is a head CT dated 8/30/2015 and I think I can see the meningioma on that scan although it was not dictated.  I would like to get an MRI of the head with contrast and a dictated comparison of meningioma size now compared to 8/30/2015.  If the tumor is growing I would probably recommend focused radiation.  The patient is satisfied with the plan.  Total time 20 minutes, more than 50% spent counseling and/or coordinating care.      Again, thank you for allowing me to participate in the care of your patient.        Sincerely,        Lionel Guerin MD

## 2023-10-24 DIAGNOSIS — E10.69 TYPE 1 DIABETES MELLITUS WITH OTHER SPECIFIED COMPLICATION (H): ICD-10-CM

## 2023-10-24 RX ORDER — INSULIN GLARGINE 100 [IU]/ML
INJECTION, SOLUTION SUBCUTANEOUS
Qty: 105 ML | Refills: 1 | OUTPATIENT
Start: 2023-10-24

## 2023-10-24 NOTE — TELEPHONE ENCOUNTER
"Requested Prescriptions   Pending Prescriptions Disp Refills    insulin glargine (LANTUS SOLOSTAR) 100 UNIT/ML pen [Pharmacy Med Name: LANTUS SOLOSTAR PEN INJ 3ML] 105 mL 1     Sig: INJECT 68 UNITS UNDER THE SKIN EVERY MORNING AND 55 UNITS EVERY EVENING.       Long Acting Insulin Protocol Passed - 10/24/2023 11:46 AM        Passed - Serum creatinine on file in past 12 months     Recent Labs   Lab Test 07/03/23  0958   CR 0.98*       Ok to refill medication if creatinine is low          Passed - HgbA1C in past 3 or 6 months     If HgbA1C is 8 or greater, it needs to be on file within the past 3 months.  If less than 8, must be on file within the past 6 months.     Recent Labs   Lab Test 07/03/23  0958   A1C 7.1*             Passed - Medication is active on med list        Passed - Patient is age 18 or older        Passed - Recent (6 mo) or future (30 days) visit within the authorizing provider's specialty     Patient had office visit in the last 6 months or has a visit in the next 30 days with authorizing provider or within the authorizing provider's specialty.  See \"Patient Info\" tab in inbasket, or \"Choose Columns\" in Meds & Orders section of the refill encounter.                 "

## 2023-11-01 DIAGNOSIS — E10.69 TYPE 1 DIABETES MELLITUS WITH OTHER SPECIFIED COMPLICATION (H): ICD-10-CM

## 2023-11-01 RX ORDER — INSULIN GLARGINE 100 [IU]/ML
INJECTION, SOLUTION SUBCUTANEOUS
Qty: 105 ML | Refills: 1 | OUTPATIENT
Start: 2023-11-01

## 2023-11-01 NOTE — TELEPHONE ENCOUNTER
"      Requested Prescriptions   Pending Prescriptions Disp Refills    insulin glargine (LANTUS SOLOSTAR) 100 UNIT/ML pen [Pharmacy Med Name: LANTUS SOLOSTAR PEN INJ 3ML] 105 mL 1     Sig: INJECT 68 UNITS UNDER THE SKIN EVERY MORNING AND 55 UNITS EVERY EVENING.       Long Acting Insulin Protocol Passed - 11/1/2023  8:37 AM        Passed - Serum creatinine on file in past 12 months     Recent Labs   Lab Test 07/03/23  0958   CR 0.98*       Ok to refill medication if creatinine is low          Passed - HgbA1C in past 3 or 6 months     If HgbA1C is 8 or greater, it needs to be on file within the past 3 months.  If less than 8, must be on file within the past 6 months.     Recent Labs   Lab Test 07/03/23  0958   A1C 7.1*             Passed - Medication is active on med list        Passed - Patient is age 18 or older        Passed - Recent (6 mo) or future (30 days) visit within the authorizing provider's specialty     Patient had office visit in the last 6 months or has a visit in the next 30 days with authorizing provider or within the authorizing provider's specialty.  See \"Patient Info\" tab in inbasket, or \"Choose Columns\" in Meds & Orders section of the refill encounter.                 "

## 2023-11-07 ENCOUNTER — TELEPHONE (OUTPATIENT)
Dept: ENDOCRINOLOGY | Facility: CLINIC | Age: 68
End: 2023-11-07
Payer: MEDICARE

## 2023-11-07 DIAGNOSIS — E10.69 TYPE 1 DIABETES MELLITUS WITH OTHER SPECIFIED COMPLICATION (H): ICD-10-CM

## 2023-11-07 RX ORDER — INSULIN GLARGINE 100 [IU]/ML
INJECTION, SOLUTION SUBCUTANEOUS
Qty: 110 ML | Refills: 0 | Status: SHIPPED | OUTPATIENT
Start: 2023-11-07 | End: 2024-03-29

## 2023-11-07 NOTE — TELEPHONE ENCOUNTER
[facility] Call Center    Phone Message    May a detailed message be left on voicemail: yes     Reason for Call: Medication Refill Request    Has the patient contacted the pharmacy for the refill? Yes   Name of medication being requested: Demi  Provider who prescribed the medication: Safia  [facility]  Date medication is needed: ASAP     Pt calling to check on the status of this refill request, says her pharmacy has been trying to get this for some time now (see 10/24/23 and 11/1/23 refill encounters-refills were refused both times, but it is unclear the reason why).   Pharmacy has told her they are unable to refill this without a new Rx from her doctor, please advise.     Action Taken: Other: Endo    Travel Screening: Not Applicable

## 2023-11-24 DIAGNOSIS — E10.69 TYPE 1 DIABETES MELLITUS WITH OTHER SPECIFIED COMPLICATION (H): ICD-10-CM

## 2023-11-27 RX ORDER — INSULIN LISPRO 100 [IU]/ML
INJECTION, SOLUTION INTRAVENOUS; SUBCUTANEOUS
Qty: 90 ML | Refills: 0 | Status: SHIPPED | OUTPATIENT
Start: 2023-11-27 | End: 2024-03-22

## 2023-11-27 NOTE — TELEPHONE ENCOUNTER
"      Requested Prescriptions   Pending Prescriptions Disp Refills    HUMALOG KWIKPEN 100 UNIT/ML soln [Pharmacy Med Name: HUMALOG 100 U/ML KWIK PEN INJ 3ML] 75 mL 3     Sig: USE THREE TIMES DAILY WITH MEALS AS DIRECTED UP  UNITS PER DAY       Short Acting Insulin Protocol Passed - 11/24/2023  6:29 PM        Passed - Serum creatinine on file in past 12 months     Recent Labs   Lab Test 07/03/23  0958   CR 0.98*       Ok to refill medication if creatinine is low          Passed - HgbA1C in past 3 or 6 months     If HgbA1C is 8 or greater, it needs to be on file within the past 3 months.  If less than 8, must be on file within the past 6 months.     Recent Labs   Lab Test 07/03/23  0958   A1C 7.1*             Passed - Medication is active on med list        Passed - Patient is age 18 or older        Passed - Recent (6 mo) or future (30 days) visit within the authorizing provider's specialty     Patient had office visit in the last 6 months or has a visit in the next 30 days with authorizing provider or within the authorizing provider's specialty.  See \"Patient Info\" tab in inbasket, or \"Choose Columns\" in Meds & Orders section of the refill encounter.                 "

## 2023-11-29 DIAGNOSIS — E03.4 HYPOTHYROIDISM DUE TO ACQUIRED ATROPHY OF THYROID: ICD-10-CM

## 2023-11-29 RX ORDER — LEVOTHYROXINE SODIUM 75 UG/1
TABLET ORAL
Qty: 90 TABLET | Refills: 2 | Status: SHIPPED | OUTPATIENT
Start: 2023-11-29 | End: 2024-08-26

## 2023-12-04 ENCOUNTER — HOSPITAL ENCOUNTER (OUTPATIENT)
Dept: MRI IMAGING | Facility: HOSPITAL | Age: 68
Discharge: HOME | End: 2023-12-04
Attending: SURGERY | Admitting: SURGERY
Payer: MEDICARE

## 2023-12-04 DIAGNOSIS — D32.9 MENINGIOMA (H): ICD-10-CM

## 2023-12-04 PROCEDURE — A9585 GADOBUTROL INJECTION: HCPCS | Mod: JZ | Performed by: SURGERY

## 2023-12-04 PROCEDURE — 70553 MRI BRAIN STEM W/O & W/DYE: CPT | Mod: MG

## 2023-12-04 PROCEDURE — 255N000002 HC RX 255 OP 636: Mod: JZ | Performed by: SURGERY

## 2023-12-04 PROCEDURE — G1010 CDSM STANSON: HCPCS

## 2023-12-04 RX ORDER — GADOBUTROL 604.72 MG/ML
0.1 INJECTION INTRAVENOUS ONCE
Status: COMPLETED | OUTPATIENT
Start: 2023-12-04 | End: 2023-12-04

## 2023-12-04 RX ADMIN — GADOBUTROL 17 ML: 604.72 INJECTION INTRAVENOUS at 13:19

## 2023-12-15 ENCOUNTER — TELEPHONE (OUTPATIENT)
Dept: NEUROSURGERY | Facility: CLINIC | Age: 68
End: 2023-12-15
Payer: MEDICARE

## 2023-12-15 NOTE — TELEPHONE ENCOUNTER
[facility] Call Center    Phone Message    May a detailed message be left on voicemail: yes     Reason for Call: Other: NiloCorinna is calling because she had an MRI 12/4 and has not heard anything. Can someone call her.     Action Taken: Other: ns    Travel Screening: Not Applicable

## 2023-12-18 DIAGNOSIS — E55.9 VITAMIN D DEFICIENCY: ICD-10-CM

## 2023-12-18 DIAGNOSIS — G47.33 OSA (OBSTRUCTIVE SLEEP APNEA): ICD-10-CM

## 2023-12-18 DIAGNOSIS — K21.9 GASTROESOPHAGEAL REFLUX DISEASE, UNSPECIFIED WHETHER ESOPHAGITIS PRESENT: ICD-10-CM

## 2023-12-18 RX ORDER — TRAZODONE HYDROCHLORIDE 100 MG/1
TABLET ORAL
Qty: 90 TABLET | Refills: 0 | Status: SHIPPED | OUTPATIENT
Start: 2023-12-18 | End: 2024-04-29

## 2023-12-18 RX ORDER — MULTIVITAMIN WITH FOLIC ACID 400 MCG
1 TABLET ORAL DAILY
Qty: 90 TABLET | Refills: 0 | Status: SHIPPED | OUTPATIENT
Start: 2023-12-18 | End: 2024-05-28

## 2023-12-18 RX ORDER — PANTOPRAZOLE SODIUM 40 MG/1
40 TABLET, DELAYED RELEASE ORAL DAILY
Qty: 90 TABLET | Refills: 0 | Status: SHIPPED | OUTPATIENT
Start: 2023-12-18 | End: 2024-04-22

## 2024-01-01 ASSESSMENT — ENCOUNTER SYMPTOMS
JOINT SWELLING: 0
HEARTBURN: 0
DYSURIA: 0
DIZZINESS: 0
SORE THROAT: 0
PARESTHESIAS: 0
WEAKNESS: 0
FEVER: 0
ARTHRALGIAS: 0
COUGH: 0
HEMATURIA: 0
DIARRHEA: 0
NAUSEA: 0
CONSTIPATION: 0
ABDOMINAL PAIN: 0
PALPITATIONS: 0
MYALGIAS: 0
HEMATOCHEZIA: 0
HEADACHES: 0
NERVOUS/ANXIOUS: 0
CHILLS: 0
BREAST MASS: 0
FREQUENCY: 0
SHORTNESS OF BREATH: 0
EYE PAIN: 0

## 2024-01-01 ASSESSMENT — ACTIVITIES OF DAILY LIVING (ADL): CURRENT_FUNCTION: NO ASSISTANCE NEEDED

## 2024-01-03 ENCOUNTER — OFFICE VISIT (OUTPATIENT)
Dept: NEUROSURGERY | Facility: CLINIC | Age: 69
End: 2024-01-03
Payer: MEDICARE

## 2024-01-03 VITALS — DIASTOLIC BLOOD PRESSURE: 78 MMHG | SYSTOLIC BLOOD PRESSURE: 133 MMHG | OXYGEN SATURATION: 93 % | HEART RATE: 103 BPM

## 2024-01-03 DIAGNOSIS — D32.9 MENINGIOMA (H): Primary | ICD-10-CM

## 2024-01-03 PROCEDURE — 99213 OFFICE O/P EST LOW 20 MIN: CPT | Performed by: SURGERY

## 2024-01-03 ASSESSMENT — PAIN SCALES - GENERAL: PAINLEVEL: NO PAIN (0)

## 2024-01-03 NOTE — PROGRESS NOTES
The patient is a 68-year-old female.  She does not have symptoms.  She has diabetes.  She takes insulin.  She is morbidly obese with a BMI of 64.  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  If we take it out surgically we are going to have to pack the ethmoid sinus from above and cover it with fascia and periosteum and glue.  I did show the pictures to the patient and her significant other.  I would like to present her to brain tumor conference to get an opinion regarding focused radiation.  The tumor measures 2.8 cm so it is probably within the spectrum of focused radiation.  The patient and her significant other are satisfied with the plan.  Total time 15 minutes, more than 50% spent counseling and/or coordinating care.

## 2024-01-03 NOTE — NURSING NOTE
"Corinna Farias is a 68 year old female who presents for:  Chief Complaint   Patient presents with    RECHECK        Initial Vitals:  /78   Pulse 103   LMP  (LMP Unknown)   SpO2 93%  Estimated body mass index is 63.78 kg/m  as calculated from the following:    Height as of 7/6/23: 5' 5\" (1.651 m).    Weight as of 7/12/23: 383 lb 4.8 oz (173.9 kg). BP completed using cuff size: regular  No Pain (0)    Masood Menendez    "

## 2024-01-03 NOTE — LETTER
1/3/2024         RE: Corinna Farias  [address]  [place] [address]        Dear Colleague,    Thank you for referring your patient, Corinna Farias, to the [facility]. Please see a copy of my visit note below.    The patient is a 68-year-old female.  She does not have symptoms.  She has diabetes.  She takes insulin.  She is morbidly obese with a BMI of 64.  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  If we take it out surgically we are going to have to pack the ethmoid sinus from above and cover it with fascia and periosteum and glue.  I did show the pictures to the patient and her significant other.  I would like to present her to brain tumor conference to get an opinion regarding focused radiation.  The tumor measures 2.8 cm so it is probably within the spectrum of focused radiation.  The patient and her significant other are satisfied with the plan.  Total time 15 minutes, more than 50% spent counseling and/or coordinating care.      Again, thank you for allowing me to participate in the care of your patient.        Sincerely,        Lionel Guerin MD

## 2024-01-08 ENCOUNTER — OFFICE VISIT (OUTPATIENT)
Dept: INTERNAL MEDICINE | Facility: CLINIC | Age: 69
End: 2024-01-08
Payer: MEDICARE

## 2024-01-08 ENCOUNTER — LAB (OUTPATIENT)
Dept: LAB | Facility: CLINIC | Age: 69
End: 2024-01-08
Payer: MEDICARE

## 2024-01-08 VITALS
HEART RATE: 100 BPM | RESPIRATION RATE: 19 BRPM | HEIGHT: 65 IN | SYSTOLIC BLOOD PRESSURE: 124 MMHG | TEMPERATURE: 97.7 F | WEIGHT: 293 LBS | OXYGEN SATURATION: 97 % | BODY MASS INDEX: 48.82 KG/M2 | DIASTOLIC BLOOD PRESSURE: 72 MMHG

## 2024-01-08 DIAGNOSIS — Z12.31 VISIT FOR SCREENING MAMMOGRAM: ICD-10-CM

## 2024-01-08 DIAGNOSIS — N18.31 STAGE 3A CHRONIC KIDNEY DISEASE (H): ICD-10-CM

## 2024-01-08 DIAGNOSIS — I10 ESSENTIAL HYPERTENSION, BENIGN: ICD-10-CM

## 2024-01-08 DIAGNOSIS — E03.4 HYPOTHYROIDISM DUE TO ACQUIRED ATROPHY OF THYROID: ICD-10-CM

## 2024-01-08 DIAGNOSIS — Z00.00 ENCOUNTER FOR MEDICARE ANNUAL WELLNESS EXAM: Primary | ICD-10-CM

## 2024-01-08 DIAGNOSIS — G47.33 OSA (OBSTRUCTIVE SLEEP APNEA): ICD-10-CM

## 2024-01-08 DIAGNOSIS — Z29.11 NEED FOR VACCINATION AGAINST RESPIRATORY SYNCYTIAL VIRUS: ICD-10-CM

## 2024-01-08 DIAGNOSIS — E66.01 MORBID OBESITY (H): ICD-10-CM

## 2024-01-08 DIAGNOSIS — R82.90 ABNORMAL URINALYSIS: ICD-10-CM

## 2024-01-08 DIAGNOSIS — E10.22 TYPE 1 DIABETES MELLITUS WITH STAGE 3A CHRONIC KIDNEY DISEASE (H): ICD-10-CM

## 2024-01-08 DIAGNOSIS — N18.31 TYPE 1 DIABETES MELLITUS WITH STAGE 3A CHRONIC KIDNEY DISEASE (H): ICD-10-CM

## 2024-01-08 DIAGNOSIS — D32.9 MENINGIOMA (H): ICD-10-CM

## 2024-01-08 LAB
ALBUMIN SERPL BCG-MCNC: 3.9 G/DL (ref 3.5–5.2)
ALBUMIN UR-MCNC: ABNORMAL MG/DL
ALP SERPL-CCNC: 116 U/L (ref 40–150)
ALT SERPL W P-5'-P-CCNC: 21 U/L (ref 0–50)
ANION GAP SERPL CALCULATED.3IONS-SCNC: 10 MMOL/L (ref 7–15)
ANION GAP SERPL CALCULATED.3IONS-SCNC: 11 MMOL/L (ref 7–15)
APPEARANCE UR: ABNORMAL
AST SERPL W P-5'-P-CCNC: 23 U/L (ref 0–45)
BACTERIA #/AREA URNS HPF: ABNORMAL /HPF
BILIRUB SERPL-MCNC: 0.7 MG/DL
BILIRUB UR QL STRIP: NEGATIVE
BUN SERPL-MCNC: 21.7 MG/DL (ref 8–23)
BUN SERPL-MCNC: 22.2 MG/DL (ref 8–23)
CALCIUM SERPL-MCNC: 9.5 MG/DL (ref 8.8–10.2)
CALCIUM SERPL-MCNC: 9.9 MG/DL (ref 8.8–10.2)
CHLORIDE SERPL-SCNC: 100 MMOL/L (ref 98–107)
CHLORIDE SERPL-SCNC: 100 MMOL/L (ref 98–107)
CHOLEST SERPL-MCNC: 141 MG/DL
COLOR UR AUTO: YELLOW
CREAT SERPL-MCNC: 1.04 MG/DL (ref 0.51–0.95)
CREAT SERPL-MCNC: 1.1 MG/DL (ref 0.51–0.95)
CREAT UR-MCNC: 303 MG/DL
DEPRECATED HCO3 PLAS-SCNC: 26 MMOL/L (ref 22–29)
DEPRECATED HCO3 PLAS-SCNC: 27 MMOL/L (ref 22–29)
EGFRCR SERPLBLD CKD-EPI 2021: 55 ML/MIN/1.73M2
EGFRCR SERPLBLD CKD-EPI 2021: 59 ML/MIN/1.73M2
ERYTHROCYTE [DISTWIDTH] IN BLOOD BY AUTOMATED COUNT: 11.9 % (ref 10–15)
FASTING STATUS PATIENT QL REPORTED: YES
GLUCOSE SERPL-MCNC: 139 MG/DL (ref 70–99)
GLUCOSE SERPL-MCNC: 202 MG/DL (ref 70–99)
GLUCOSE UR STRIP-MCNC: NEGATIVE MG/DL
HBA1C MFR BLD: 6.9 % (ref 0–5.6)
HCT VFR BLD AUTO: 45.5 % (ref 35–47)
HDLC SERPL-MCNC: 52 MG/DL
HGB BLD-MCNC: 14.6 G/DL (ref 11.7–15.7)
HGB UR QL STRIP: ABNORMAL
KETONES UR STRIP-MCNC: NEGATIVE MG/DL
LDLC SERPL CALC-MCNC: 71 MG/DL
LEUKOCYTE ESTERASE UR QL STRIP: ABNORMAL
MCH RBC QN AUTO: 32.8 PG (ref 26.5–33)
MCHC RBC AUTO-ENTMCNC: 32.1 G/DL (ref 31.5–36.5)
MCV RBC AUTO: 102 FL (ref 78–100)
MICROALBUMIN UR-MCNC: 24.2 MG/L
MICROALBUMIN/CREAT UR: 7.99 MG/G CR (ref 0–25)
MUCOUS THREADS #/AREA URNS LPF: PRESENT /LPF
NITRATE UR QL: NEGATIVE
NONHDLC SERPL-MCNC: 89 MG/DL
PH UR STRIP: 5.5 [PH] (ref 5–8)
PLATELET # BLD AUTO: 292 10E3/UL (ref 150–450)
POTASSIUM SERPL-SCNC: 4.1 MMOL/L (ref 3.4–5.3)
POTASSIUM SERPL-SCNC: 4.6 MMOL/L (ref 3.4–5.3)
PROT SERPL-MCNC: 7.8 G/DL (ref 6.4–8.3)
RBC # BLD AUTO: 4.45 10E6/UL (ref 3.8–5.2)
RBC #/AREA URNS AUTO: ABNORMAL /HPF
SODIUM SERPL-SCNC: 137 MMOL/L (ref 135–145)
SODIUM SERPL-SCNC: 137 MMOL/L (ref 135–145)
SP GR UR STRIP: >=1.03 (ref 1–1.03)
SQUAMOUS #/AREA URNS AUTO: ABNORMAL /LPF
T4 FREE SERPL-MCNC: 1.44 NG/DL (ref 0.9–1.7)
TRANS CELLS #/AREA URNS HPF: ABNORMAL /HPF
TRIGL SERPL-MCNC: 92 MG/DL
TSH SERPL DL<=0.005 MIU/L-ACNC: 2.06 UIU/ML (ref 0.3–4.2)
UROBILINOGEN UR STRIP-ACNC: 1 E.U./DL
WBC # BLD AUTO: 9.7 10E3/UL (ref 4–11)
WBC #/AREA URNS AUTO: ABNORMAL /HPF

## 2024-01-08 PROCEDURE — 36415 COLL VENOUS BLD VENIPUNCTURE: CPT

## 2024-01-08 PROCEDURE — 84443 ASSAY THYROID STIM HORMONE: CPT

## 2024-01-08 PROCEDURE — 80061 LIPID PANEL: CPT | Performed by: INTERNAL MEDICINE

## 2024-01-08 PROCEDURE — 82570 ASSAY OF URINE CREATININE: CPT | Performed by: INTERNAL MEDICINE

## 2024-01-08 PROCEDURE — 82043 UR ALBUMIN QUANTITATIVE: CPT | Performed by: INTERNAL MEDICINE

## 2024-01-08 PROCEDURE — 99214 OFFICE O/P EST MOD 30 MIN: CPT | Mod: 25 | Performed by: INTERNAL MEDICINE

## 2024-01-08 PROCEDURE — G0438 PPPS, INITIAL VISIT: HCPCS | Performed by: INTERNAL MEDICINE

## 2024-01-08 PROCEDURE — 81001 URINALYSIS AUTO W/SCOPE: CPT | Performed by: INTERNAL MEDICINE

## 2024-01-08 PROCEDURE — 87086 URINE CULTURE/COLONY COUNT: CPT | Performed by: INTERNAL MEDICINE

## 2024-01-08 PROCEDURE — 84439 ASSAY OF FREE THYROXINE: CPT

## 2024-01-08 PROCEDURE — 80048 BASIC METABOLIC PNL TOTAL CA: CPT

## 2024-01-08 PROCEDURE — 85027 COMPLETE CBC AUTOMATED: CPT | Performed by: INTERNAL MEDICINE

## 2024-01-08 PROCEDURE — 83036 HEMOGLOBIN GLYCOSYLATED A1C: CPT

## 2024-01-08 PROCEDURE — 80053 COMPREHEN METABOLIC PANEL: CPT | Performed by: INTERNAL MEDICINE

## 2024-01-08 RX ORDER — RESPIRATORY SYNCYTIAL VIRUS VACCINE 120MCG/0.5
0.5 KIT INTRAMUSCULAR ONCE
Qty: 1 EACH | Refills: 0 | Status: SHIPPED | OUTPATIENT
Start: 2024-01-08 | End: 2024-01-08

## 2024-01-08 ASSESSMENT — ENCOUNTER SYMPTOMS
DIZZINESS: 0
DIARRHEA: 0
FEVER: 0
NERVOUS/ANXIOUS: 0
SHORTNESS OF BREATH: 0
EYE PAIN: 0
NAUSEA: 0
HEMATOCHEZIA: 0
HEMATURIA: 0
BREAST MASS: 0
PALPITATIONS: 0
DYSURIA: 0
HEARTBURN: 0
MYALGIAS: 0
JOINT SWELLING: 0
PARESTHESIAS: 0
ABDOMINAL PAIN: 0
WEAKNESS: 0
ARTHRALGIAS: 0
FREQUENCY: 0
SORE THROAT: 0
CONSTIPATION: 0
HEADACHES: 0
COUGH: 0
CHILLS: 0

## 2024-01-08 ASSESSMENT — ACTIVITIES OF DAILY LIVING (ADL): CURRENT_FUNCTION: NO ASSISTANCE NEEDED

## 2024-01-08 NOTE — PROGRESS NOTES
"SUBJECTIVE:   Corinna is a 68 year old, presenting for the following:  Wellness Visit (Fasting )        1/8/2024     8:01 AM   Additional Questions   Roomed by Fabiola   Accompanied by Self       Are you in the first 12 months of your Medicare coverage?  No    Healthy Habits:     In general, how would you rate your overall health?  Fair    Frequency of exercise:  None    Do you usually eat at least 4 servings of fruit and vegetables a day, include whole grains    & fiber and avoid regularly eating high fat or \"junk\" foods?  No    Taking medications regularly:  Yes    Medication side effects:  None    Ability to successfully perform activities of daily living:  No assistance needed    Home Safety:  No safety concerns identified    Hearing Impairment:  No hearing concerns    In the past 6 months, have you been bothered by leaking of urine?  No    In general, how would you rate your overall mental or emotional health?  Excellent    Additional concerns today:  No      Today's PHQ-2 Score:       1/8/2024     7:55 AM   PHQ-2 ( 1999 [employer])   Q1: Little interest or pleasure in doing things 0   Q2: Feeling down, depressed or hopeless 0   PHQ-2 Score 0   Q1: Little interest or pleasure in doing things Not at all   Q2: Feeling down, depressed or hopeless Not at all   PHQ-2 Score 0     Corinna comes in today for follow-up of her multiple medical problems.  She is here for annual wellness as well.  She is a pleasant morbidly obese woman with diabetes which has been diagnosis of type 1 diabetes.  She is on insulin and has a CGM.  Followed by endocrinology.  Incidentally recently found to have a brain tumor which is thought to be a meningioma but it is enlarging and encroaching on sinus.  Neurosurgery is contemplating surgery versus radiation.  She denies other somatic concerns for me.  She does have sleep apnea which is untreated for many years due to refusal to use CPAP.    Have you ever done Advance Care Planning? (For example, a " Health Directive, POLST, or a discussion with a medical provider or your loved ones about your wishes): No, advance care planning information given to patient to review.  Advanced care planning was discussed at today's visit.       Fall risk  Fallen 2 or more times in the past year?: No  Any fall with injury in the past year?: No    Cognitive Screening   1) Repeat 3 items (Leader, Season, Table)    2) Clock draw: NORMAL  3) 3 item recall: Recalls 3 objects  Results:  NORMAL CLOCK    Mini-CogTM Copyright S De[employer]. Licensed by the author for use in [facility]; reprinted with permission (chidi@[facility]). All rights reserved.      Do you have sleep apnea, excessive snoring or daytime drowsiness? : no    Reviewed and updated as needed this visit by clinical staff   Tobacco  Allergies  Meds              Reviewed and updated as needed this visit by Provider                 Social History     Tobacco Use    Smoking status: Never    Smokeless tobacco: Never   Substance Use Topics    Alcohol use: No             1/1/2024    10:09 AM   Alcohol Use   Prescreen: >3 drinks/day or >7 drinks/week? Not Applicable     Do you have a current opioid prescription? No  Do you use any other controlled substances or medications that are not prescribed by a provider? None      Current providers sharing in care for this patient include:   Patient Care Team:  Masood Melton MD as PCP - General  Elieser Melton MD as MD (Surgery)  Masood Melton MD as Assigned PCP  Rossy Baig NP as Nurse Practitioner  Lionel Guerin MD as Assigned Neuroscience Provider    The following health maintenance items are reviewed in Epic and correct as of today:  Health Maintenance   Topic Date Due    DEXA  Never done    RSV VACCINE (Pregnancy & 60+) (1 - 1-dose 60+ series) Never done    MAMMO SCREENING  03/07/2020    LIPID  01/02/2024    A1C  01/03/2024    MEDICARE ANNUAL WELLNESS VISIT  01/30/2024    MICROALBUMIN  01/30/2024     DIABETIC FOOT EXAM  01/30/2024    ANNUAL REVIEW OF HM ORDERS  01/30/2024    HEMOGLOBIN  01/30/2024    BMP  07/03/2024    TSH W/FREE T4 REFLEX  07/03/2024    EYE EXAM  09/22/2024    FALL RISK ASSESSMENT  01/08/2025    ADVANCE CARE PLANNING  01/30/2028    COLORECTAL CANCER SCREENING  05/04/2031    DTAP/TDAP/TD IMMUNIZATION (3 - Td or Tdap) 04/28/2032    HEPATITIS C SCREENING  Completed    PHQ-2 (once per calendar year)  Completed    INFLUENZA VACCINE  Completed    Pneumococcal Vaccine: 65+ Years  Completed    URINALYSIS  Completed    ZOSTER IMMUNIZATION  Completed    COVID-19 Vaccine  Completed    IPV IMMUNIZATION  Aged Out    HPV IMMUNIZATION  Aged Out    MENINGITIS IMMUNIZATION  Aged Out    RSV MONOCLONAL ANTIBODY  Aged Out    PAP  Discontinued               1/25/2023     2:00 PM   Breast CA Risk Assessment (FHS-7)   Do you have a family history of breast, colon, or ovarian cancer? No / Unknown           Pertinent mammograms are reviewed under the imaging tab.    Review of Systems   Constitutional:  Negative for chills and fever.   HENT:  Negative for congestion, ear pain, hearing loss and sore throat.    Eyes:  Negative for pain and visual disturbance.   Respiratory:  Negative for cough and shortness of breath.    Cardiovascular:  Negative for chest pain, palpitations and peripheral edema.   Gastrointestinal:  Negative for abdominal pain, constipation, diarrhea, heartburn, hematochezia and nausea.   Breasts:  Negative for tenderness, breast mass and discharge.   Genitourinary:  Negative for dysuria, frequency, genital sores, hematuria, pelvic pain, urgency, vaginal bleeding and vaginal discharge.   Musculoskeletal:  Negative for arthralgias, joint swelling and myalgias.   Skin:  Negative for rash.   Neurological:  Negative for dizziness, weakness, headaches and paresthesias.   Psychiatric/Behavioral:  Negative for mood changes. The patient is not nervous/anxious.          OBJECTIVE:   /72 (BP Location: Left  "arm, Patient Position: Sitting, Cuff Size: Adult Large)   Pulse 100   Temp 97.7  F (36.5  C) (Tympanic)   Resp 19   Ht 1.651 m (5' 5\")   Wt (!) 175.1 kg (386 lb)   LMP  (LMP Unknown)   SpO2 97%   BMI 64.23 kg/m   Estimated body mass index is 64.23 kg/m  as calculated from the following:    Height as of this encounter: 1.651 m (5' 5\").    Weight as of this encounter: 175.1 kg (386 lb).  Physical Exam  Morbidly obese woman.  No distress.  Heart regular.  Diabetic foot exam with no open areas.  1+ pulses bilaterally at all sites.  Normal monofilament testing throughout.        ASSESSMENT / PLAN:   1. Encounter for Medicare annual wellness exam  Patient had a colonoscopy a couple years ago because of a positive Cologuard and that was negative.  They recommended 10-year follow-up which will be in 2031.  I have recommended RSV vaccination.  Activity as tolerated.    2. Type 1 diabetes mellitus with stage 3a chronic kidney disease (H)  She has type 1 diabetes.  Followed by endocrinology.  Plan per them.  - Comprehensive metabolic panel (BMP + Alb, Alk Phos, ALT, AST, Total. Bili, TP); Future  - Lipid panel reflex to direct LDL Fasting; Future  - UA Macroscopic with reflex to Microscopic and Culture - Lab Collect; Future    3. Meningioma (H)  Radiation versus surgery coming up.  Await recommendations from neurosurgery.    4. GIOVANI (obstructive sleep apnea)--refuses CPAP  I think that treating her sleep apnea would be in her best interest.  I did convince her to talk to the sleep people again  - Adult Sleep Eval & Management  Referral; Future    5. Essential hypertension, benign  Blood pressure looks good.    6. Morbid obesity (H)  I have asked that she discuss GLP-1 agonist with her endocrinologist.  They would not be indicated for her diabetes but they may be indicated for her morbid obesity.    7. Stage 3a chronic kidney disease (H)    - Albumin Random Urine Quantitative with Creat Ratio; Future  - CBC with " "platelets; Future    8. Hypothyroidism due to acquired atrophy of thyroid  She seems euthyroid.  Plan per endocrinology.    9. Need for vaccination against respiratory syncytial virus    - respiratory syncytial virus vaccine, bivalent (ABRYSVO) injection; Inject 0.5 mLs into the muscle once for 1 dose  Dispense: 1 each; Refill: 0    10. Visit for screening mammogram  She has not had a mammogram in some time.  - MA Screen Bilateral w/Kevon; Future     Patient has been advised of split billing requirements and indicates understanding: Yes      COUNSELING:  Reviewed preventive health counseling, as reflected in patient instructions      BMI:   Estimated body mass index is 64.23 kg/m  as calculated from the following:    Height as of this encounter: 1.651 m (5' 5\").    Weight as of this encounter: 175.1 kg (386 lb).   Weight management plan: Patient referred to endocrine and/or weight management specialty      She reports that she has never smoked. She has never used smokeless tobacco.      Appropriate preventive services were discussed with this patient, including applicable screening as appropriate for fall prevention, nutrition, physical activity, Tobacco-use cessation, weight loss and cognition.  Checklist reviewing preventive services available has been given to the patient.    Reviewed patients plan of care and provided an AVS. The Complex Care Plan (for patients with higher acuity and needing more deliberate coordination of services) for Corinna meets the Care Plan requirement. This Care Plan has been established and reviewed with the Patient.          MAGALI JEAN-BAPTISTE MD  [facility]    Identified Health Risks:  I have reviewed Opioid Use Disorder and Substance Use Disorder risk factors and made any needed referrals. The patient was provided with suggestions to help her develop a healthy physical lifestyle.  She is at risk for lack of exercise and has been provided with information to " increase physical activity for the benefit of her well-being.  The patient was counseled and encouraged to consider modifying their diet and eating habits. She was provided with information on recommended healthy diet options.

## 2024-01-08 NOTE — PATIENT INSTRUCTIONS
Patient Education   Personalized Prevention Plan  You are due for the preventive services outlined below.  Your care team is available to assist you in scheduling these services.  If you have already completed any of these items, please share that information with your care team to update in your medical record.  Health Maintenance Due   Topic Date Due     Osteoporosis Screening  Never done     RSV VACCINE (Pregnancy & 60+) (1 - 1-dose 60+ series) Never done     Mammogram  03/07/2020     Cholesterol Lab  01/02/2024     Annual Wellness Visit  01/30/2024     Kidney Microalbumin Urine Test  01/30/2024     Diabetic Foot Exam  01/30/2024     ANNUAL REVIEW OF HM ORDERS  01/30/2024     Hemoglobin  01/30/2024     Your Health Risk Assessment indicates you feel you are not in good health    A healthy lifestyle helps keep the body fit and the mind alert. It helps protect you from disease, helps you fight disease, and helps prevent chronic disease (disease that doesn't go away) from getting worse. This is important as you get older and begin to notice twinges in muscles and joints and a decline in the strength and stamina you once took for granted. A healthy lifestyle includes good healthcare, good nutrition, weight control, recreation, and regular exercise. Avoid harmful substances and do what you can to keep safe. Another part of a healthy lifestyle is stay mentally active and socially involved.    Good healthcare   Have a wellness visit every year.   If you have new symptoms, let us know right away. Don't wait until the next checkup.   Take medicines exactly as prescribed and keep your medicines in a safe place. Tell us if your medicine causes problems.   Healthy diet and weight control   Eat 3 or 4 small, nutritious, low-fat, high-fiber meals a day. Include a variety of fruits, vegetables, and whole-grain foods.   Make sure you get enough calcium in your diet. Calcium, vitamin D, and exercise help prevent osteoporosis (bone  "thinning).   If you live alone, try eating with others when you can. That way you get a good meal and have company while you eat it.   Try to keep a healthy weight. If you eat more calories than your body uses for energy, it will be stored as fat and you will gain weight.     Recreation   Recreation is not limited to sports and team events. It includes any activity that provides relaxation, interest, enjoyment, and exercise. Recreation provides an outlet for physical, mental, and social energy. It can give a sense of worth and achievement. It can help you stay healthy.    Mental Exercise and Social Involvement  Mental and emotional health is as important as physical health. Keep in touch with friends and family. Stay as active as possible. Continue to learn and challenge yourself.   Things you can do to stay mentally active are:  Learn something new, like a foreign language or musical instrument.   Play SCRABBLE or do crossword puzzles. If you cannot find people to play these games with you at home, you can play them with others on your computer through the Internet.   Join a games club--anything from card games to chess or checkers or lawn bowling.   Start a new hobby.   Go back to school.   Volunteer.   Read.   Keep up with world events.  Learning About Being Physically Active  What is physical activity?     Being physically active means doing any kind of activity that gets your body moving.  The types of physical activity that can help you get fit and stay healthy include:  Aerobic or \"cardio\" activities. These make your heart beat faster and make you breathe harder, such as brisk walking, riding a bike, or running. They strengthen your heart and lungs and build up your endurance.  Strength training activities. These make your muscles work against, or \"resist,\" something. Examples include lifting weights or doing push-ups. These activities help tone and strengthen your muscles and bones.  Stretches. These let you " move your joints and muscles through their full range of motion. Stretching helps you be more flexible.  Reaching a balance between these three types of physical activity is important because each one contributes to your overall fitness.  What are the benefits of being active?  Being active is one of the best things you can do for your health. It helps you to:  Feel stronger and have more energy to do all the things you like to do.  Focus better at school or work.  Feel, think, and sleep better.  Reach and stay at a healthy weight.  Lose fat and build lean muscle.  Lower your risk for serious health problems, including diabetes, heart attack, high blood pressure, and some cancers.  Keep your heart, lungs, bones, muscles, and joints strong and healthy.  How can you make being active part of your life?  Start slowly. Make it your long-term goal to get at least 30 minutes of exercise on most days of the week. Walking is a good choice. You also may want to do other activities, such as running, swimming, cycling, or playing tennis or team sports.  Pick activities that you like--ones that make your heart beat faster, your muscles stronger, and your muscles and joints more flexible. If you find more than one thing you like doing, do them all. You don't have to do the same thing every day.  Get your heart pumping every day. Any activity that makes your heart beat faster and keeps it at that rate for a while counts.  Here are some great ways to get your heart beating faster:  Go for a brisk walk, run, or hike.  Go for a swim or bike ride.  Take an online exercise class or dance.  Play a game of touch football, basketball, or soccer.  Play tennis, pickleball, or racquetball.  Climb stairs.  Even some household chores can be aerobic. Just do them at a faster pace. Raking or mowing the lawn, sweeping the garage, and vacuuming and cleaning your home all can help get your heart rate up.  Strengthen your muscles during the week.  "You don't have to lift heavy weights or grow big, bulky muscles to get stronger. Doing a few simple activities that make your muscles work against, or \"resist,\" something can help you get stronger. Aim for at least twice a week.  For example, you can:  Do push-ups or sit-ups, which use your own body weight as resistance.  Lift weights or dumbbells or use stretch bands at home or in a gym or community center.  Stretch your muscles often. Stretching will help you as you become more active. It can help you stay flexible and loosen tight muscles. It can also help improve your balance and posture and can be a great way to relax.  Be sure to stretch the muscles you'll be using when you work out. It's best to warm your muscles slightly before you stretch them. Walk or do some other light aerobic activity for a few minutes. Then start stretching.  When you stretch your muscles:  Do it slowly. Stretching is not about going fast or making sudden movements.  Don't push or bounce during a stretch.  Hold each stretch for at least 15 to 30 seconds, if you can. You should feel a stretch in the muscle, but not pain.  Breathe out as you do the stretch. Then breathe in as you hold the stretch. Don't hold your breath.  If you're worried about how more activity might affect your health, have a checkup before you start. Follow any special advice your doctor gives you for getting a smart start.  Where can you learn more?  Go to https://www.[employer].net/patiented  Enter W332 in the search box to learn more about \"Learning About Being Physically Active.\"  Current as of: June 6, 2023               Content Version: 13.8    6113-1067 [employer].   Care instructions adapted under license by your healthcare professional. If you have questions about a medical condition or this instruction, always ask your healthcare professional. [employer] disclaims any warranty or liability for your use of this " information.      Learning About Dietary Guidelines  What are the Dietary Guidelines for Americans?     Dietary Guidelines for Americans provide tips for eating well and staying healthy. This helps reduce the risk for long-term (chronic) diseases.  These guidelines recommend that you:  Eat and drink the right amount for you. The U.S. government's food guide is called MyPlate. It can help you make your own well-balanced eating plan.  Try to balance your eating with your activity. This helps you stay at a healthy weight.  Drink alcohol in moderation, if at all.  Limit foods high in salt, saturated fat, trans fat, and added sugar.  These guidelines are from the U.S. Department of Agriculture and the U.S. Department of Health and Human Services. They are updated every 5 years.  What is MyPlate?  MyPlate is the U.S. government's food guide. It can help you make your own well-balanced eating plan. A balanced eating plan means that you eat enough, but not too much, and that your food gives you the nutrients you need to stay healthy.  MyPlate focuses on eating plenty of whole grains, fruits, and vegetables, and on limiting fat and sugar. It is available online at www.ChooseMyPlate.gov.  How can you get started?  If you're trying to eat healthier, you can slowly change your eating habits over time. You don't have to make big changes all at once. Start by adding one or two healthy foods to your meals each day.  Grains  Choose whole-grain breads and cereals and whole-wheat pasta and whole-grain crackers.  Vegetables  Eat a variety of vegetables every day. They have lots of nutrients and are part of a heart-healthy diet.  Fruits  Eat a variety of fruits every day. Fruits contain lots of nutrients. Choose fresh fruit instead of fruit juice.  Protein foods  Choose fish and lean poultry more often. Eat red meat and fried meats less often. Dried beans, tofu, and nuts are also good sources of protein.  Dairy  Choose low-fat or  "fat-free products from this food group. If you have problems digesting milk, try eating cheese or yogurt instead.  Fats and oils  Limit fats and oils if you're trying to cut calories. Choose healthy fats when you cook. These include canola oil and olive oil.  Where can you learn more?  Go to https://www.[employer].net/patiented  Enter D676 in the search box to learn more about \"Learning About Dietary Guidelines.\"  Current as of: February 28, 2023               Content Version: 13.8    0347-3392 [employer].   Care instructions adapted under license by your healthcare professional. If you have questions about a medical condition or this instruction, always ask your healthcare professional. [employer] disclaims any warranty or liability for your use of this information.         "

## 2024-01-09 LAB — BACTERIA UR CULT: NORMAL

## 2024-01-10 DIAGNOSIS — E11.29 TYPE 2 DIABETES MELLITUS WITH MICROALBUMINURIA, WITH LONG-TERM CURRENT USE OF INSULIN (H): ICD-10-CM

## 2024-01-10 DIAGNOSIS — Z79.4 TYPE 2 DIABETES MELLITUS WITH MICROALBUMINURIA, WITH LONG-TERM CURRENT USE OF INSULIN (H): ICD-10-CM

## 2024-01-10 DIAGNOSIS — R80.9 TYPE 2 DIABETES MELLITUS WITH MICROALBUMINURIA, WITH LONG-TERM CURRENT USE OF INSULIN (H): ICD-10-CM

## 2024-01-10 RX ORDER — FLASH GLUCOSE SENSOR
KIT MISCELLANEOUS
Qty: 2 EACH | Refills: 5 | Status: SHIPPED | OUTPATIENT
Start: 2024-01-10 | End: 2024-04-01

## 2024-01-10 NOTE — TELEPHONE ENCOUNTER
PRESCRIPTIONS MUST BE WRITTEN THIS WAY TO MAKE THEM MEDICARE COMPLIANT    FREESTYLE [place] 14 DAY SENSORS  SIG: Change every 14 days.  QTY: 2  REFILLS: 5    -Prescriptions must be written after the clinical note date and will only be able to be used for 6 months from the date of the clinical notes. All prescriptions must be from same provider who patient had visit with. (We will be requesting new clinical notes and prescriptions every 6 months to meet Medicare Guidelines.)    Please contact us at [phone] (this number is for clinics only) with any questions. Please only give [phone] to patients.    [facility]   [address] [place] [address]  Phone # [phone]  Fax # [phone]

## 2024-01-16 ENCOUNTER — TELEPHONE (OUTPATIENT)
Dept: NEUROSURGERY | Facility: CLINIC | Age: 69
End: 2024-01-16

## 2024-01-16 ENCOUNTER — OFFICE VISIT (OUTPATIENT)
Dept: ENDOCRINOLOGY | Facility: CLINIC | Age: 69
End: 2024-01-16
Payer: MEDICARE

## 2024-01-16 VITALS
BODY MASS INDEX: 64.23 KG/M2 | WEIGHT: 293 LBS | HEART RATE: 97 BPM | OXYGEN SATURATION: 94 % | SYSTOLIC BLOOD PRESSURE: 130 MMHG | DIASTOLIC BLOOD PRESSURE: 84 MMHG

## 2024-01-16 DIAGNOSIS — E10.22 TYPE 1 DIABETES MELLITUS WITH STAGE 3A CHRONIC KIDNEY DISEASE (H): Primary | ICD-10-CM

## 2024-01-16 DIAGNOSIS — D32.9 MENINGIOMA (H): Primary | ICD-10-CM

## 2024-01-16 DIAGNOSIS — N18.31 TYPE 1 DIABETES MELLITUS WITH STAGE 3A CHRONIC KIDNEY DISEASE (H): Primary | ICD-10-CM

## 2024-01-16 DIAGNOSIS — E03.9 HYPOTHYROIDISM, UNSPECIFIED TYPE: ICD-10-CM

## 2024-01-16 PROCEDURE — 99214 OFFICE O/P EST MOD 30 MIN: CPT | Performed by: NURSE PRACTITIONER

## 2024-01-16 NOTE — TELEPHONE ENCOUNTER
Who's calling: Patient  (Patient/ family calling on behalf of the patient)  On C2C: N/a  (Family/friends calling are they on patient Consent to Communicate form)   Reason for call: Patient stated someone from Dr. Guerin's team was supposed to call her back yesterday but she still hasn't received anything. Per patient for radiation treatment, Truong was supposed to go to a conference and share her case to the meeting to see if she was a possible candidate.   Please advise.   (Keep it simple but as detailed as to why the patient/family is calling)  Preferred Pharmacy: n/a  Call back #: [phone]  Providers name: Dr. Guerin

## 2024-01-16 NOTE — LETTER
"    1/16/2024         RE: Corinna Farias  [address]  [place] [address]        Dear Colleague,    Thank you for referring your patient, Corinna Farias, to the [facility]. Please see a copy of my visit note below.    [facility] ENDOCRINOLOGY    Diabetes Note 1/16/2024    Corinna Farias, 1955, [id number]          Reason for visit      1. Type 1 diabetes mellitus with stage 3a chronic kidney disease (H)    2. Hypothyroidism, unspecified type        HPI     Corinna Farias is a very pleasant 68 year old old female who presents for follow up.  SUMMARY:    Corinna is seen today in follow-up for DM 1 and Hypothyroidism. Her current A1c is 6.9 and the lowest that it has been in some time. She continues to use the [employer] CGM which was downloaded and data was reviewed.     TIR was 55% and GMI of 7.4. She mentions that she thought that her A1c would have been a lot higher than it was.  She did have some recorded hypoglycemia in the 54-69 range but not for any extended period of time. She was high or very high, 42% of the time.     She is taking Lantus, 65 units in the morning and 55 units in the evening. She is taking Humalog, using correction and at meals, up to 100 units/day.     Current TSH is 2.06 and fT4 is 1.44. She is having no problems referable to her neck at present. She takes 75 mcg of Levothyroxine daily on an empty stomach.     Renal function remains stable. Lipid profile results look good. She is taking a Statin and an ACE.     Pt reports today that she has been found with a brain tumor. It was found incidentally with MRI that was done in ED. Apparently, she has had it since 2015 and reports that \"no one told me\". They are deciding whether she needs surgery or if it can be irradiated.         Blood glucose data:      Past Medical History     Patient Active Problem List   Diagnosis     Bariatric surgery status (GASTRIC BYPASS)     Vitamin D deficiency     " Essential hypertension, benign     GERD (gastroesophageal reflux disease)     Hypothyroidism     Microalbuminuric diabetic nephropathy (H)     Morbid obesity (H)     GIOVANI (obstructive sleep apnea)     Chronic kidney disease, stage 3     Cervical cancer screening     Encounter for long-term (current) use of insulin (H)     Female climacteric state     Insomnia     Pseudophakia     Type 1 diabetes mellitus (H)        Family History       family history includes Diabetes in her father and mother.    Social History      reports that she has never smoked. She has never used smokeless tobacco. She reports that she does not drink alcohol and does not use drugs.      Review of Systems     Patient has no polyuria or polydipsia, no chest pain, dyspnea or TIA's, no numbness, tingling or pain in extremities  Remainder negative except as noted in HPI.    Vital Signs     /84 (BP Location: Left arm, Patient Position: Sitting, Cuff Size: Adult Large)   Pulse 97   Wt (!) 175.1 kg (386 lb)   LMP  (LMP Unknown)   SpO2 94%   BMI 64.23 kg/m    Wt Readings from Last 3 Encounters:   01/16/24 (!) 175.1 kg (386 lb)   01/08/24 (!) 175.1 kg (386 lb)   07/12/23 (!) 173.9 kg (383 lb 4.8 oz)       Physical Exam     Constitutional:  Well developed, Well nourished  HENT:  Normocephalic,   Neck: normal in appearance  Eyes:  PERRL, Conjunctiva pink  Respiratory:  No respiratory distress  Skin: No acanthosis nigricans, lipoatrophy or lipodystrophy  Neurologic:  Alert & oriented x 3, nonfocal  Psychiatric:  Affect, Mood, Insight appropriate      Assessment     1. Type 1 diabetes mellitus with stage 3a chronic kidney disease (H)    2. Hypothyroidism, unspecified type        Plan     No changes to her insulin regimen today.     She is bio-chemically and physically euthyroid. No changes to her Levothyroxine dosing today.     Follow-up with me in 6 months.       Rossy Baig NP  [facility]  1/16/2024  8:18 AM        Lab Results      Microalbumin Urine mg/dL   Date Value Ref Range Status   10/15/2020 6.40 (H) 0.00 - 1.99 mg/dL Final       Cholesterol   Date Value Ref Range Status   01/08/2024 141 <200 mg/dL Final     Direct Measure HDL   Date Value Ref Range Status   01/08/2024 52 >=50 mg/dL Final     Triglycerides   Date Value Ref Range Status   01/08/2024 92 <150 mg/dL Final       [unfilled]      Current Medications     Outpatient Medications Prior to Visit   Medication Sig Dispense Refill     ASPIRIN PO Take 81 mg by mouth daily       atorvastatin (LIPITOR) 20 MG tablet TAKE 1 TABLET(20 MG) BY MOUTH DAILY 90 tablet 3     Continuous Blood Gluc Sensor ([employer] CorporationSTYLE AUSTIN 14 DAY SENSOR) MISC CHANGE SENSOR EVERY 14 DAYS AS DIRECTED 2 each 5     HUMALOG KWIKPEN 100 UNIT/ML soln USE THREE TIMES DAILY WITH MEALS AS DIRECTED UP  UNITS PER DAY 90 mL 0     insulin glargine (LANTUS SOLOSTAR) 100 UNIT/ML pen INJECT 68 UNITS UNDER THE SKIN EVERY MORNING AND INJECT 55 UNITS EVERY EVENING (Patient taking differently: INJECT 65 UNITS UNDER THE SKIN EVERY MORNING AND INJECT 55 UNITS EVERY EVENING) 110 mL 0     levothyroxine (SYNTHROID/LEVOTHROID) 75 MCG tablet TAKE ONE TABLET AT LEAST ONE HOUR BEFORE OR TWO HOURS AFTER A MEAL 90 tablet 2     lisinopril (ZESTRIL) 20 MG tablet Take 1 tablet (20 mg) by mouth daily 90 tablet 2     Multiple Vitamin (DAILY-MICHAEL MULTIVITAMIN) TABS Take 1 tablet by mouth daily 90 tablet 0     pantoprazole (PROTONIX) 40 MG EC tablet Take 1 tablet (40 mg) by mouth daily 90 tablet 0     traZODone (DESYREL) 100 MG tablet TAKE 1 TABLET BY MOUTH AT BEDTIME AS NEEDED FOR SLEEP 90 tablet 0     vitamin D3 25 mcg (1000 units) tablet Take 2 tablets (50 mcg) by mouth daily 180 tablet 3     No facility-administered medications prior to visit.                   Again, thank you for allowing me to participate in the care of your patient.        Sincerely,        Rossy Baig NP

## 2024-01-16 NOTE — PROGRESS NOTES
"[facility] ENDOCRINOLOGY    Diabetes Note 1/16/2024    Corinna Farias, 1955, [id number]          Reason for visit      1. Type 1 diabetes mellitus with stage 3a chronic kidney disease (H)    2. Hypothyroidism, unspecified type        HPI     Corinna Farias is a very pleasant 68 year old old female who presents for follow up.  SUMMARY:    Corinna is seen today in follow-up for DM 1 and Hypothyroidism. Her current A1c is 6.9 and the lowest that it has been in some time. She continues to use the [employer] CGM which was downloaded and data was reviewed.     TIR was 55% and GMI of 7.4. She mentions that she thought that her A1c would have been a lot higher than it was.  She did have some recorded hypoglycemia in the 54-69 range but not for any extended period of time. She was high or very high, 42% of the time.     She is taking Lantus, 65 units in the morning and 55 units in the evening. She is taking Humalog, using correction and at meals, up to 100 units/day.     Current TSH is 2.06 and fT4 is 1.44. She is having no problems referable to her neck at present. She takes 75 mcg of Levothyroxine daily on an empty stomach.     Renal function remains stable. Lipid profile results look good. She is taking a Statin and an ACE.     Pt reports today that she has been found with a brain tumor. It was found incidentally with MRI that was done in ED. Apparently, she has had it since 2015 and reports that \"no one told me\". They are deciding whether she needs surgery or if it can be irradiated.         Blood glucose data:      Past Medical History     Patient Active Problem List   Diagnosis    Bariatric surgery status (GASTRIC BYPASS)    Vitamin D deficiency    Essential hypertension, benign    GERD (gastroesophageal reflux disease)    Hypothyroidism    Microalbuminuric diabetic nephropathy (H)    Morbid obesity (H)    GIOVANI (obstructive sleep apnea)    Chronic kidney disease, stage 3    Cervical cancer screening    Encounter " for long-term (current) use of insulin (H)    Female climacteric state    Insomnia    Pseudophakia    Type 1 diabetes mellitus (H)        Family History       family history includes Diabetes in her father and mother.    Social History      reports that she has never smoked. She has never used smokeless tobacco. She reports that she does not drink alcohol and does not use drugs.      Review of Systems     Patient has no polyuria or polydipsia, no chest pain, dyspnea or TIA's, no numbness, tingling or pain in extremities  Remainder negative except as noted in HPI.    Vital Signs     /84 (BP Location: Left arm, Patient Position: Sitting, Cuff Size: Adult Large)   Pulse 97   Wt (!) 175.1 kg (386 lb)   LMP  (LMP Unknown)   SpO2 94%   BMI 64.23 kg/m    Wt Readings from Last 3 Encounters:   01/16/24 (!) 175.1 kg (386 lb)   01/08/24 (!) 175.1 kg (386 lb)   07/12/23 (!) 173.9 kg (383 lb 4.8 oz)       Physical Exam     Constitutional:  Well developed, Well nourished  HENT:  Normocephalic,   Neck: normal in appearance  Eyes:  PERRL, Conjunctiva pink  Respiratory:  No respiratory distress  Skin: No acanthosis nigricans, lipoatrophy or lipodystrophy  Neurologic:  Alert & oriented x 3, nonfocal  Psychiatric:  Affect, Mood, Insight appropriate      Assessment     1. Type 1 diabetes mellitus with stage 3a chronic kidney disease (H)    2. Hypothyroidism, unspecified type        Plan     No changes to her insulin regimen today.     She is bio-chemically and physically euthyroid. No changes to her Levothyroxine dosing today.     Follow-up with me in 6 months.       Rossy Baig NP   Endocrinology  1/16/2024  8:18 AM        Lab Results     Microalbumin Urine mg/dL   Date Value Ref Range Status   10/15/2020 6.40 (H) 0.00 - 1.99 mg/dL Final       Cholesterol   Date Value Ref Range Status   01/08/2024 141 <200 mg/dL Final     Direct Measure HDL   Date Value Ref Range Status   01/08/2024 52 >=50 mg/dL Final      Triglycerides   Date Value Ref Range Status   01/08/2024 92 <150 mg/dL Final       [unfilled]      Current Medications     Outpatient Medications Prior to Visit   Medication Sig Dispense Refill    ASPIRIN PO Take 81 mg by mouth daily      atorvastatin (LIPITOR) 20 MG tablet TAKE 1 TABLET(20 MG) BY MOUTH DAILY 90 tablet 3    Continuous Blood Gluc Sensor (FREESTYLE AUSTIN 14 DAY SENSOR) MISC CHANGE SENSOR EVERY 14 DAYS AS DIRECTED 2 each 5    HUMALOG KWIKPEN 100 UNIT/ML soln USE THREE TIMES DAILY WITH MEALS AS DIRECTED UP  UNITS PER DAY 90 mL 0    insulin glargine (LANTUS SOLOSTAR) 100 UNIT/ML pen INJECT 68 UNITS UNDER THE SKIN EVERY MORNING AND INJECT 55 UNITS EVERY EVENING (Patient taking differently: INJECT 65 UNITS UNDER THE SKIN EVERY MORNING AND INJECT 55 UNITS EVERY EVENING) 110 mL 0    levothyroxine (SYNTHROID/LEVOTHROID) 75 MCG tablet TAKE ONE TABLET AT LEAST ONE HOUR BEFORE OR TWO HOURS AFTER A MEAL 90 tablet 2    lisinopril (ZESTRIL) 20 MG tablet Take 1 tablet (20 mg) by mouth daily 90 tablet 2    Multiple Vitamin (DAILY-MICHAEL MULTIVITAMIN) TABS Take 1 tablet by mouth daily 90 tablet 0    pantoprazole (PROTONIX) 40 MG EC tablet Take 1 tablet (40 mg) by mouth daily 90 tablet 0    traZODone (DESYREL) 100 MG tablet TAKE 1 TABLET BY MOUTH AT BEDTIME AS NEEDED FOR SLEEP 90 tablet 0    vitamin D3 25 mcg (1000 units) tablet Take 2 tablets (50 mcg) by mouth daily 180 tablet 3     No facility-administered medications prior to visit.

## 2024-01-17 ENCOUNTER — PATIENT OUTREACH (OUTPATIENT)
Dept: ONCOLOGY | Facility: CLINIC | Age: 69
End: 2024-01-17
Payer: MEDICARE

## 2024-01-17 NOTE — TELEPHONE ENCOUNTER
Called and let Corinna know her case has been reviewed at the neuro/oncology tumor conference ton 01/15/2024. The consensus recommendation was to consider radiation therapy -  a referral was sent to Dr. Rust.  This has been discussed with the patient today. Questions were answered to patient's satisfaction.  Francia Alvarez RN, CNRN

## 2024-01-17 NOTE — PROGRESS NOTES
New Patient Radiation Oncology Nurse Navigator Note     Referring provider:   [facility] [phone]        Referred to (specialty): Radiation Oncology    Requested provider (if applicable):   [facility] Rad[facility]: [phone]  Dr. Rust     Date Referral Received:   01/17/24     Evaluation for :   Meningioma (H)     Clinical History (per Nurse review of records provided):    This summer patient went to [facility] emergency room with Bell's palsy.  She had an MRI of the head without contrast.  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  There is a head CT dated 8/30/2015, done for Neuro deficit, acute, stroke suspected.  Per Dr. Guerin note (10/13/23) - I think I can see the meningioma on that scan although it was not dictated.      Patient went on to have MRI of the head with contrast and a dictated comparison of meningioma size now compared to 8/30/2015.    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  If we take it out surgically we are going to have to pack the ethmoid sinus from above and cover it with fascia and periosteum and glue.    Case was presented at brain tumor conference to get an opinion regarding focused radiation. Tumor conference recommendations were to consider radiation- referred to Dr. Rust.      Records Location (Care Everywhere, Media, etc.): Epic  [facility]     Previous radiation treatment:   NO     I called Corinna, to discuss referral to oncology.  I introduced my role and reviewed what this consult visit will entail/what to expect.  I reviewed the location and gave contact numbers including new patient scheduling and clinic phone numbers.   Corinna, has no other questions at this time.    I forwarded on referral with scheduling instructions for the following     PLAN: HOLD: Dr. Rust, 1/23, [facility] rad onc, 11-12, NEW,  in person     I transferred call to our new patient scheduling to finalize appointment.    Jannie Willard, RN, BSN  Oncology New Patient Nurse Navigator   [facility] [place]  [phone]

## 2024-01-18 NOTE — TELEPHONE ENCOUNTER
Referring Provider/Location:  Lionel Guerin MD   Dx and Code: Meningioma (H)   Appt Date:  1.23.24  Provider: Barrera    January 18, 2024 9:55 AM TJ   Internal Referral, LVM for Pt to CB and confirm no previous radiation or outside records needed

## 2024-01-23 ENCOUNTER — OFFICE VISIT (OUTPATIENT)
Dept: RADIATION ONCOLOGY | Facility: HOSPITAL | Age: 69
End: 2024-01-23
Attending: SURGERY
Payer: MEDICARE

## 2024-01-23 ENCOUNTER — PRE VISIT (OUTPATIENT)
Dept: RADIATION ONCOLOGY | Facility: HOSPITAL | Age: 69
End: 2024-01-23
Payer: MEDICARE

## 2024-01-23 VITALS
SYSTOLIC BLOOD PRESSURE: 140 MMHG | HEART RATE: 96 BPM | BODY MASS INDEX: 64.87 KG/M2 | DIASTOLIC BLOOD PRESSURE: 82 MMHG | OXYGEN SATURATION: 96 % | RESPIRATION RATE: 20 BRPM | WEIGHT: 293 LBS

## 2024-01-23 DIAGNOSIS — D32.9 MENINGIOMA (H): ICD-10-CM

## 2024-01-23 PROCEDURE — 77470 SPECIAL RADIATION TREATMENT: CPT | Performed by: RADIOLOGY

## 2024-01-23 PROCEDURE — 77263 THER RADIOLOGY TX PLNG CPLX: CPT | Performed by: RADIOLOGY

## 2024-01-23 PROCEDURE — 99205 OFFICE O/P NEW HI 60 MIN: CPT | Mod: 25 | Performed by: RADIOLOGY

## 2024-01-23 PROCEDURE — 77470 SPECIAL RADIATION TREATMENT: CPT | Mod: 26 | Performed by: RADIOLOGY

## 2024-01-23 PROCEDURE — G0463 HOSPITAL OUTPT CLINIC VISIT: HCPCS | Mod: 25 | Performed by: RADIOLOGY

## 2024-01-23 ASSESSMENT — PAIN SCALES - GENERAL: PAINLEVEL: NO PAIN (0)

## 2024-01-23 NOTE — PROGRESS NOTES
"Oncology Rooming Note    January 23, 2024 11:05 AM   Corinna Farias is a 68 year old female who presents for:    Chief Complaint   Patient presents with    Oncology Clinic Visit    Benign Neoplasm/meningioma Evaluation     Rad Onc consult     Initial Vitals: BP (!) 140/82   Pulse 96   Resp 20   Wt (!) 176.8 kg (389 lb 12.8 oz)   LMP  (LMP Unknown)   SpO2 96%   BMI 64.87 kg/m   Estimated body mass index is 64.87 kg/m  as calculated from the following:    Height as of 1/8/24: 1.651 m (5' 5\").    Weight as of this encounter: 176.8 kg (389 lb 12.8 oz). Body surface area is 2.85 meters squared.  No Pain (0) Comment: Data Unavailable   No LMP recorded (lmp unknown). Patient is postmenopausal.  Allergies reviewed: Yes  Medications reviewed: Yes    Medications: Medication refills not needed today.  Pharmacy name entered into [facility]: [facility] #[id number] [place] [address] AT [facility]    Clinical concerns: Here to discuss RT to meningioma, she states she is asymptomatic. She is obese and we talked about our table weight limits. Dr. Rust and [place], physicist do think that they can treat her at [facility] as their limit is higher. Pt was understanding of this restriction.   Dr. Rust was notified.    Radiation Therapy Patient Education    Person involved with teaching: Patient and     Patient educational needs for self management of treatment-related side effects assessment completed.  [facility] Patient Ed tab contains Patient Learning Assessment    Education Materials Given  Radiation Treatment For Cancer, Radiosurgery Pathway, Radiation Therapy to the Brain Guidelines, Oncology Supportive Care Services , Welcome Letter, and Insurance PA Information    Educational Topics Discussed  Side effects expected, Pain management, Skin care, Activity, and Nutrition and weight loss    Response To Teaching  Verbalizes understanding    GYN Only  Vaginal Dilator-given and educated: " N/A    Referrals sent: None    Chemotherapy?  No    No prior RT  No ICD  Post-menopausal    Taylor Dunn RN

## 2024-01-23 NOTE — LETTER
1/23/2024         RE: Corinna Farias  [address]  [place] [address]        Dear Colleague,    Thank you for referring your patient, Corinna Farias, to the [facility] RADIATION ONCOLOGY [place]. Please see a copy of my visit note below.    [facility] Radiation Oncology Consult Note     Patient: Corinna Farias  MRN: [id number]  Date of Service: 01/23/2024          Lionel Guerin MD  [facility] NEUROSURGERY  [address] [place] [address]       Dear Dr. Guerin:    Thank you very much for referring this patient for consideration of radiotherapy. As you know Ms. Farias is a 68 year old female with a clinical diagnosis of meningioma involving the planum sphenoidale managing 2.8 cm with MRI evidence of increased significantly in size from CT of 2015.  The patient is asymptomatic and is referred to radiation oncology for evaluation and discussion of possible treatment options including stereotactic radiosurgery.    HISTORY OF PRESENT ILLNESS:   Ms. Farias is a 68 year old female who is diabetic and takes insulin and is morbidly obese (390 lbs) with a BMI of 64.  Patient was found to have meningioma by recent MRI scan for stroke evaluation.  The MRI brain 12/4/2023  showed meningioma along the planum sphenoidale. This measures approximately 2.8 cm anteriorly posteriorly by 2.2 cm transversely by 2.0 cm craniocaudally on the current study. There is no surrounding edema. On the MRI from 08/27/2023 without contrast the meningioma measured approximately 2.3 cm anteriorly posteriorly by 2.2 cm transversely by 1.6 cm craniocaudally. On the CT from 08/30/2015 it is very hard to discretely visualize the meningioma. Retrospectively there may be a tiny meningioma approximately 5 mm x 7 mm as seen on axial image #17. No on the current study.  There is also a small tiny meningioma along the inferior right frontal lobe.  Patient is clinically asymptomatic.  Her case has been discussed at  neuro tumor conference and the consensus recommendation is to consider stereotactic radiosurgery.  The patient is here today for evaluation and discussion of possible radiation therapy.    CHEMOTHERAPY HISTORY: Concurrent Chemotherapy: No    RADIATION THERAPY HISTORY: Prior Radiation: No    IMPLANTED CARDIAC DEVICE: none     PREGNANCY: The patient is informed not to be pregnant during the radiation therapy.  She is post menopausal.    Current Outpatient Medications   Medication Sig Dispense Refill     ASPIRIN PO Take 81 mg by mouth daily       atorvastatin (LIPITOR) 20 MG tablet TAKE 1 TABLET(20 MG) BY MOUTH DAILY 90 tablet 3     Continuous Blood Gluc Sensor (FREESTYLE AUSTIN 14 DAY SENSOR) MISC CHANGE SENSOR EVERY 14 DAYS AS DIRECTED 2 each 5     HUMALOG KWIKPEN 100 UNIT/ML soln USE THREE TIMES DAILY WITH MEALS AS DIRECTED UP  UNITS PER DAY 90 mL 0     insulin glargine (LANTUS SOLOSTAR) 100 UNIT/ML pen INJECT 68 UNITS UNDER THE SKIN EVERY MORNING AND INJECT 55 UNITS EVERY EVENING (Patient taking differently: INJECT 65 UNITS UNDER THE SKIN EVERY MORNING AND INJECT 55 UNITS EVERY EVENING) 110 mL 0     levothyroxine (SYNTHROID/LEVOTHROID) 75 MCG tablet TAKE ONE TABLET AT LEAST ONE HOUR BEFORE OR TWO HOURS AFTER A MEAL 90 tablet 2     lisinopril (ZESTRIL) 20 MG tablet Take 1 tablet (20 mg) by mouth daily 90 tablet 2     Multiple Vitamin (DAILY-MICHAEL MULTIVITAMIN) TABS Take 1 tablet by mouth daily 90 tablet 0     pantoprazole (PROTONIX) 40 MG EC tablet Take 1 tablet (40 mg) by mouth daily 90 tablet 0     traZODone (DESYREL) 100 MG tablet TAKE 1 TABLET BY MOUTH AT BEDTIME AS NEEDED FOR SLEEP 90 tablet 0     vitamin D3 25 mcg (1000 units) tablet Take 2 tablets (50 mcg) by mouth daily 180 tablet 3     Past Medical History:   Diagnosis Date     Chronic back pain      Diabetes (H)     type I     Diabetes mellitus (H)      GERD (gastroesophageal reflux disease)      Hypercholesteremia      Hyperlipidemia       Hyperlipidemia      Hypertension      Hypertension      Hypothyroid      Meningioma (H)      GIOVANI (obstructive sleep apnea)     not on CPAP     Vitamin D deficiency      Past Surgical History:   Procedure Laterality Date     APPENDECTOMY      concomitant     CHOLECYSTECTOMY       CHOLECYSTECTOMY       ENDOSCOPIC INSERTION TUBE GASTROSTOMY N/A 5/12/2015    Procedure: ENDOSCOPIC INSERTION TUBE GASTROSTOMY;  Surgeon: Elieser Melton MD;  Location: [facility] OR     ESOPHAGOSCOPY, GASTROSCOPY, DUODENOSCOPY (EGD), COMBINED Left 11/12/2014    Procedure: COMBINED ESOPHAGOSCOPY, GASTROSCOPY, DUODENOSCOPY (EGD), BIOPSY SINGLE OR MULTIPLE;  Surgeon: Elieser Melton MD;  Location: [facility] GI     ESOPHAGOSCOPY, GASTROSCOPY, DUODENOSCOPY (EGD), COMBINED N/A 3/4/2015    Procedure: COMBINED ESOPHAGOSCOPY, GASTROSCOPY, DUODENOSCOPY (EGD);  Surgeon: Elieser Melton MD;  [facility]     ESOPHAGOSCOPY, GASTROSCOPY, DUODENOSCOPY (EGD), COMBINED N/A 5/12/2015    Procedure: COMBINED ESOPHAGOSCOPY, GASTROSCOPY, DUODENOSCOPY (EGD);  Surgeon: Elieser Melton MD;  Location: [facility] OR     GASTRIC BYPASS       GASTRIC RESTRICTION SURGERY       GASTROPLASTY VERTICAL BANDING N/A 3/23/87    ZAK; silastic ring; OR report confirmed.     OTHER SURGICAL HISTORY      gastric restriction takedown     [facility] COLONOSCOPY W/WO BRUSH/WASH N/A 5/4/2021    Procedure: COLONOSCOPY;  Surgeon: Iris Sebastian MD;  Location: [facility];  Service: Gastroenterology     [facility] UGI ENDOSCOPY W TRANSENDOSCOPIC STENT PLACEMENT N/A 2/4/2015    Procedure: COMBINED ESOPHAGOSCOPY, GASTROSCOPY, DUODENOSCOPY (EGD), PLACE TRANSENDOSCOPIC ESOPHAGEAL STENT;  Surgeon: Elieser Melton MD;  Location: [facility] OR     No Known Allergies  Family History   Problem Relation Age of Onset     Diabetes Mother      Diabetes Father      Social History     Socioeconomic History     Marital status:      Spouse name: Not on file     Number of children: Not on file      Years of education: Not on file     Highest education level: Not on file   Occupational History     Not on file   Tobacco Use     Smoking status: Never     Smokeless tobacco: Never   Vaping Use     Vaping Use: Never used   Substance and Sexual Activity     Alcohol use: No     Drug use: No     Sexual activity: Not on file   Other Topics Concern     Parent/sibling w/ CABG, MI or angioplasty before 65F 55M? Not Asked   Social History Narrative    ** Merged History Encounter **       Social Determinants of Health     Financial Resource Strain: Low Risk  (1/1/2024)    Financial Resource Strain      Within the past 12 months, have you or your family members you live with been unable to get utilities (heat, electricity) when it was really needed?: No   Food Insecurity: Low Risk  (1/1/2024)    Food Insecurity      Within the past 12 months, did you worry that your food would run out before you got money to buy more?: No      Within the past 12 months, did the food you bought just not last and you didn t have money to get more?: No   Transportation Needs: Low Risk  (1/1/2024)    Transportation Needs      Within the past 12 months, has lack of transportation kept you from medical appointments, getting your medicines, non-medical meetings or appointments, work, or from getting things that you need?: No   Physical Activity: Not on file   Stress: Not on file   Social Connections: Not on file   Interpersonal Safety: Not on file   Housing Stability: Low Risk  (1/1/2024)    Housing Stability      Do you have housing? : Yes      Are you worried about losing your housing?: No        REVIEW OF SYMPTOMS:  A full 14-point review of systems was performed. Pertinent findings are noted in the HPI.    General  Constitutional  Constitutional (WDL): All constitutional elements are within defined limits  EENT  Eye Disorders  Eye Disorder (WDL): All eye disorder elements are within defined limits  Ear Disorders  Ear Disorder (WDL): All ear  disorder elements are within defined limits  Respiratory  Respiratory  Respiratory (WDL): All respiratory elements are within defined limits  Cardiovascular  Cardiovascular  Cardiovascular (WDL): All cardiovascular elements are within defined limits  Gastrointestinal  Gastrointestinal  Gastrointestinal (WDL): All gastrointestinal elements are within defined limits  Musculoskeletal  Musculoskeletal and Connective Tissue Disorders  Musculoskeletal & Connective (WDL): All musculoskeletal & connective elements are within defined limits  Integumentary  Integumentary  Integumentary (WDL): All integumentary elements are within defined limits  Neurological  Neurosensory  Neurosensory (WDL): Exceptions to WDL  Ataxia: Moderate symptoms OR limiting instrumental ADL (Uses a power w/c)  Genitourinary/Reproductive  Genitourinary  Genitourinary (WDL): Assessment not pertinent to visit  Lymphatic  Lymph System Disorders  Lymph (WDL): Assessment not pertinent to visit  Pain  Pain Score: No Pain (0)  AUA Assessment                                                              Accompanied by  Accompanied By: spouse    ECOG Status: 2    Imaging: Reviewed    Objective:     PHYSICAL EXAMINATION:    BP (!) 140/82   Pulse 96   Resp 20   Wt (!) 176.8 kg (389 lb 12.8 oz)   LMP  (LMP Unknown)   SpO2 96%   BMI 64.87 kg/m      Gen: Alert, in NAD  Eyes: PERRL, EOMI, sclera anicteric  Neck: Supple, full ROM, no LAD  Pulm: No wheezing, stridor or respiratory distress  CV: Well-perfused, no cyanosis, no pedal edema  Back: No step-offs or pain to palpation along the thoracolumbar spine  Rectal: Deferred  : Deferred  Musculoskeletal: Normal muscle bulk and tone  Skin: Normal color and turgor  Neurologic: A/Ox3, CN II-XII intact, normal gait and station  Psychiatric: Appropriate mood and affect    Intent of Therapy: Curative  Side effects that may occur during or within weeks after Radiation Therapy    Fatigue and general weakness  Headache  and scalp irritation  Loss of hair  Nausea, vomiting, and decrease in appetite  Darkening, irritation, itchiness, redness, dryness, peeling, thickening, scabbing, and ulceration of the scalp, neck and forehead    Side effects that may occur months or years after Radiation Therapy    Development of another tumor or cancer         Dizziness and balance problems  Decrease in hormone production  Brain inflammation or necrosis that may cause various neurologic symptoms  Seizures  Decrease in memory and thinking abilities  Decrease in hearing and feeling of ear congestion  Eye dryness and irritation  Loss of vision  Cataracts in the eyes  Poor healing after a trauma or surgery in the irradiated area  Facial numbness, pain and weakness    The risks, benefits and alternatives to radiation therapy were outlined with the patient. All questions were answered and a consent was signed.     Impression     Clinical diagnosis of meningioma involving the planum sphenoidale managing 2.8 cm with MRI evidence of increased significantly in size from CT of 2015.    Assessment & Plan:     I have personally reviewed her upcoming medical record today.  I have also reviewed her most recent radiology study including MRI scan.  The possible treatment options for meningioma including surgery, systemic therapy, and radiation therapy has been discussed with the patient in detail and at her great lengths.  The possible risks and side effects of radiation therapy has also been explained to the patient.  Questions are answered to patient's satisfaction.  Her case has been reviewed at neuro tumor conference in January 2024 and the consensus recommendation is to consider stereotactic radiosurgery given the evidence of significant progression of meningioma.  The options of surgery, clinical observation, and radiation therapy has again discussed with the patient.  The pros and cons of different options has also been discussed with patient.  Questions  "are answered to her satisfaction.  After long discussion, the patient elected proceed with stereotactic radiosurgery as her treatment choice for her meningioma being aware of potential risks and side effects involved.  She is scheduled to return to radiation oncology next week for simulation.  I plan to give her radiation therapy to a total dose of 9515-7607 cGy in 5 treatments targeted to the meningioma possibly including a smaller lesion using SBRT.    Again, thank you very much for the referral and allowing me to participate in the care of this patient.  If you have any questions or concerns about this consultation, please do not hesitate to call.  I spent approximately 60 minutes today with the patient and 80% time was used for counseling.      Sincerely,          Kary Rust MD, PhD  Department of Radiation Oncology   [facility] Radiation Oncology  Tel: [phone]  Page: [phone]    [facility]  [address] [place] [address]     [place] [id number] [place] [address]    CC:  Patient Care Team:  Masood Melton MD as PCP - General  Elieser Melton MD as MD (Surgery)  Masood Melton MD as Assigned PCP  Rossy Baig NP as Nurse Practitioner  Lionel Guerin MD as Assigned Neuroscience Provider  Kary Rust MD as MD (Radiation Oncology)        Oncology Rooming Note    January 23, 2024 11:05 AM   Corinna Farias is a 68 year old female who presents for:    Chief Complaint   Patient presents with     Oncology Clinic Visit     Benign Neoplasm/meningioma Evaluation     Rad Onc consult     Initial Vitals: BP (!) 140/82   Pulse 96   Resp 20   Wt (!) 176.8 kg (389 lb 12.8 oz)   LMP  (LMP Unknown)   SpO2 96%   BMI 64.87 kg/m   Estimated body mass index is 64.87 kg/m  as calculated from the following:    Height as of 1/8/24: 1.651 m (5' 5\").    Weight as of this encounter: 176.8 kg (389 lb 12.8 oz). Body surface area is 2.85 meters squared.  No Pain (0) " Comment: Data Unavailable   No LMP recorded (lmp unknown). Patient is postmenopausal.  Allergies reviewed: Yes  Medications reviewed: Yes    Medications: Medication refills not needed today.  Pharm[facility] entered into [id number] [place] [address] AT [facility]    Clinical concerns: Here to discuss RT to meningioma, she states she is asymptomatic. She is obese and we talked about our table weight limits. Dr. Rust and Frederick, physicist do think that they can treat her at [facility] as their limit is higher. Pt was understanding of this restriction.   Dr. Rust was notified.    Radiation Therapy Patient Education    Person involved with teaching: Patient and     Patient educational needs for self management of treatment-related side effects assessment completed.  [facility] Patient Ed tab contains Patient Learning Assessment    Education Materials Given  Radiation Treatment For Cancer, Radiosurgery Pathway, Radiation Therapy to the Brain Guidelines, Oncology Supportive Care Services , Welcome Letter, and Insurance PA Information    Educational Topics Discussed  Side effects expected, Pain management, Skin care, Activity, and Nutrition and weight loss    Response To Teaching  Verbalizes understanding    GYN Only  Vaginal Dilator-given and educated: N/A    Referrals sent: None    Chemotherapy?  No    No prior RT  No ICD  Post-menopausal    Taylor Dunn, CHERELLE                Again, thank you for allowing me to participate in the care of your patient.        Sincerely,        Kary Rust MD

## 2024-01-23 NOTE — PROGRESS NOTES
[facility] Radiation Oncology Consult Note     Patient: Corinna Farias  MRN: [id number]  Date of Service: 01/23/2024          Lionel Guerin MD  [facility] NEUROSURGERY  [address] [place] [id number]       Dear Dr. Guerin:    Thank you very much for referring this patient for consideration of radiotherapy. As you know Ms. Farias is a 68 year old female with a clinical diagnosis of meningioma involving the planum sphenoidale managing 2.8 cm with MRI evidence of increased significantly in size from CT of 2015.  The patient is asymptomatic and is referred to radiation oncology for evaluation and discussion of possible treatment options including stereotactic radiosurgery.    HISTORY OF PRESENT ILLNESS:   Ms. Farias is a 68 year old female who is diabetic and takes insulin and is morbidly obese (390 lbs) with a BMI of 64.  Patient was found to have meningioma by recent MRI scan for stroke evaluation.  The MRI brain 12/4/2023  showed meningioma along the planum sphenoidale. This measures approximately 2.8 cm anteriorly posteriorly by 2.2 cm transversely by 2.0 cm craniocaudally on the current study. There is no surrounding edema. On the MRI from 08/27/2023 without contrast the meningioma measured approximately 2.3 cm anteriorly posteriorly by 2.2 cm transversely by 1.6 cm craniocaudally. On the CT from 08/30/2015 it is very hard to discretely visualize the meningioma. Retrospectively there may be a tiny meningioma approximately 5 mm x 7 mm as seen on axial image #17. No on the current study.  There is also a small tiny meningioma along the inferior right frontal lobe.  Patient is clinically asymptomatic.  Her case has been discussed at neuro tumor conference and the consensus recommendation is to consider stereotactic radiosurgery.  The patient is here today for evaluation and discussion of possible radiation therapy.    CHEMOTHERAPY HISTORY: Concurrent Chemotherapy: No    RADIATION THERAPY HISTORY:  Prior Radiation: No    IMPLANTED CARDIAC DEVICE: none     PREGNANCY: The patient is informed not to be pregnant during the radiation therapy.  She is post menopausal.    Current Outpatient Medications   Medication Sig Dispense Refill    ASPIRIN PO Take 81 mg by mouth daily      atorvastatin (LIPITOR) 20 MG tablet TAKE 1 TABLET(20 MG) BY MOUTH DAILY 90 tablet 3    Continuous Blood Gluc Sensor (FREESTYLE AUSTIN 14 DAY SENSOR) MISC CHANGE SENSOR EVERY 14 DAYS AS DIRECTED 2 each 5    HUMALOG KWIKPEN 100 UNIT/ML soln USE THREE TIMES DAILY WITH MEALS AS DIRECTED UP  UNITS PER DAY 90 mL 0    insulin glargine (LANTUS SOLOSTAR) 100 UNIT/ML pen INJECT 68 UNITS UNDER THE SKIN EVERY MORNING AND INJECT 55 UNITS EVERY EVENING (Patient taking differently: INJECT 65 UNITS UNDER THE SKIN EVERY MORNING AND INJECT 55 UNITS EVERY EVENING) 110 mL 0    levothyroxine (SYNTHROID/LEVOTHROID) 75 MCG tablet TAKE ONE TABLET AT LEAST ONE HOUR BEFORE OR TWO HOURS AFTER A MEAL 90 tablet 2    lisinopril (ZESTRIL) 20 MG tablet Take 1 tablet (20 mg) by mouth daily 90 tablet 2    Multiple Vitamin (DAILY-MICHAEL MULTIVITAMIN) TABS Take 1 tablet by mouth daily 90 tablet 0    pantoprazole (PROTONIX) 40 MG EC tablet Take 1 tablet (40 mg) by mouth daily 90 tablet 0    traZODone (DESYREL) 100 MG tablet TAKE 1 TABLET BY MOUTH AT BEDTIME AS NEEDED FOR SLEEP 90 tablet 0    vitamin D3 25 mcg (1000 units) tablet Take 2 tablets (50 mcg) by mouth daily 180 tablet 3     Past Medical History:   Diagnosis Date    Chronic back pain     Diabetes (H)     type I    Diabetes mellitus (H)     GERD (gastroesophageal reflux disease)     Hypercholesteremia     Hyperlipidemia     Hyperlipidemia     Hypertension     Hypertension     Hypothyroid     Meningioma (H)     GIOVANI (obstructive sleep apnea)     not on CPAP    Vitamin D deficiency      Past Surgical History:   Procedure Laterality Date    APPENDECTOMY      concomitant    CHOLECYSTECTOMY      CHOLECYSTECTOMY       ENDOSCOPIC INSERTION TUBE GASTROSTOMY N/A 5/12/2015    Procedure: ENDOSCOPIC INSERTION TUBE GASTROSTOMY;  Surgeon: Elieser Melton MD;  Location: [facility]    ESOPHAGOSCOPY, GASTROSCOPY, DUODENOSCOPY (EGD), COMBINED Left 11/12/2014    Procedure: COMBINED ESOPHAGOSCOPY, GASTROSCOPY, DUODENOSCOPY (EGD), BIOPSY SINGLE OR MULTIPLE;  Surgeon: Elieser Melton MD;  [facility]    ESOPHAGOSCOPY, GASTROSCOPY, DUODENOSCOPY (EGD), COMBINED N/A 3/4/2015    Procedure: COMBINED ESOPHAGOSCOPY, GASTROSCOPY, DUODENOSCOPY (EGD);  Surgeon: Elieser Melton MD;  [facility]    ESOPHAGOSCOPY, GASTROSCOPY, DUODENOSCOPY (EGD), COMBINED N/A 5/12/2015    Procedure: COMBINED ESOPHAGOSCOPY, GASTROSCOPY, DUODENOSCOPY (EGD);  Surgeon: Elieser Melton MD;  [facility]    GASTRIC BYPASS      GASTRIC RESTRICTION SURGERY      GASTROPLASTY VERTICAL BANDING N/A 3/23/87    ZAK; silastic ring; OR report confirmed.    OTHER SURGICAL HISTORY      gastric restriction takedown    [facility] COLONOSCOPY W/WO BRUSH/WASH N/A 5/4/2021    Procedure: COLONOSCOPY;  Surgeon: Iris Sebastian MD;  Location: [facility];  Service: Gastroenterology    [facility] ENDOSCOPY W TRANSENDOSCOPIC STENT PLACEMENT N/A 2/4/2015    Procedure: COMBINED ESOPHAGOSCOPY, GASTROSCOPY, DUODENOSCOPY (EGD), PLACE TRANSENDOSCOPIC ESOPHAGEAL STENT;  Surgeon: Elieser Melton MD;  Location:  OR     No Known Allergies  Family History   Problem Relation Age of Onset    Diabetes Mother     Diabetes Father      Social History     Socioeconomic History    Marital status:      Spouse name: Not on file    Number of children: Not on file    Years of education: Not on file    Highest education level: Not on file   Occupational History    Not on file   Tobacco Use    Smoking status: Never    Smokeless tobacco: Never   Vaping Use    Vaping Use: Never used   Substance and Sexual Activity    Alcohol use: No    Drug use: No    Sexual activity: Not on  file   Other Topics Concern    Parent/sibling w/ CABG, MI or angioplasty before 65F 55M? Not Asked   Social History Narrative    ** Merged History Encounter **       Social Determinants of Health     Financial Resource Strain: Low Risk  (1/1/2024)    Financial Resource Strain     Within the past 12 months, have you or your family members you live with been unable to get utilities (heat, electricity) when it was really needed?: No   Food Insecurity: Low Risk  (1/1/2024)    Food Insecurity     Within the past 12 months, did you worry that your food would run out before you got money to buy more?: No     Within the past 12 months, did the food you bought just not last and you didn t have money to get more?: No   Transportation Needs: Low Risk  (1/1/2024)    Transportation Needs     Within the past 12 months, has lack of transportation kept you from medical appointments, getting your medicines, non-medical meetings or appointments, work, or from getting things that you need?: No   Physical Activity: Not on file   Stress: Not on file   Social Connections: Not on file   Interpersonal Safety: Not on file   Housing Stability: Low Risk  (1/1/2024)    Housing Stability     Do you have housing? : Yes     Are you worried about losing your housing?: No        REVIEW OF SYMPTOMS:  A full 14-point review of systems was performed. Pertinent findings are noted in the HPI.    General  Constitutional  Constitutional (WDL): All constitutional elements are within defined limits  EENT  Eye Disorders  Eye Disorder (WDL): All eye disorder elements are within defined limits  Ear Disorders  Ear Disorder (WDL): All ear disorder elements are within defined limits  Respiratory  Respiratory  Respiratory (WDL): All respiratory elements are within defined limits  Cardiovascular  Cardiovascular  Cardiovascular (WDL): All cardiovascular elements are within defined limits  Gastrointestinal  Gastrointestinal  Gastrointestinal (WDL): All  gastrointestinal elements are within defined limits  Musculoskeletal  Musculoskeletal and Connective Tissue Disorders  Musculoskeletal & Connective (WDL): All musculoskeletal & connective elements are within defined limits  Integumentary  Integumentary  Integumentary (WDL): All integumentary elements are within defined limits  Neurological  Neurosensory  Neurosensory (WDL): Exceptions to WDL  Ataxia: Moderate symptoms OR limiting instrumental ADL (Uses a power w/c)  Genitourinary/Reproductive  Genitourinary  Genitourinary (WDL): Assessment not pertinent to visit  Lymphatic  Lymph System Disorders  Lymph (WDL): Assessment not pertinent to visit  Pain  Pain Score: No Pain (0)  AUA Assessment                                                              Accompanied by  Accompanied By: spouse    ECOG Status: 2    Imaging: Reviewed    Objective:     PHYSICAL EXAMINATION:    BP (!) 140/82   Pulse 96   Resp 20   Wt (!) 176.8 kg (389 lb 12.8 oz)   LMP  (LMP Unknown)   SpO2 96%   BMI 64.87 kg/m      Gen: Alert, in NAD  Eyes: PERRL, EOMI, sclera anicteric  Neck: Supple, full ROM, no LAD  Pulm: No wheezing, stridor or respiratory distress  CV: Well-perfused, no cyanosis, no pedal edema  Back: No step-offs or pain to palpation along the thoracolumbar spine  Rectal: Deferred  : Deferred  Musculoskeletal: Normal muscle bulk and tone  Skin: Normal color and turgor  Neurologic: A/Ox3, CN II-XII intact, normal gait and station  Psychiatric: Appropriate mood and affect    Intent of Therapy: Curative  Side effects that may occur during or within weeks after Radiation Therapy    Fatigue and general weakness  Headache and scalp irritation  Loss of hair  Nausea, vomiting, and decrease in appetite  Darkening, irritation, itchiness, redness, dryness, peeling, thickening, scabbing, and ulceration of the scalp, neck and forehead    Side effects that may occur months or years after Radiation Therapy    Development of another tumor or  cancer         Dizziness and balance problems  Decrease in hormone production  Brain inflammation or necrosis that may cause various neurologic symptoms  Seizures  Decrease in memory and thinking abilities  Decrease in hearing and feeling of ear congestion  Eye dryness and irritation  Loss of vision  Cataracts in the eyes  Poor healing after a trauma or surgery in the irradiated area  Facial numbness, pain and weakness    The risks, benefits and alternatives to radiation therapy were outlined with the patient. All questions were answered and a consent was signed.     Impression     Clinical diagnosis of meningioma involving the planum sphenoidale managing 2.8 cm with MRI evidence of increased significantly in size from CT of 2015.    Assessment & Plan:     I have personally reviewed her upcoming medical record today.  I have also reviewed her most recent radiology study including MRI scan.  The possible treatment options for meningioma including surgery, systemic therapy, and radiation therapy has been discussed with the patient in detail and at her great lengths.  The possible risks and side effects of radiation therapy has also been explained to the patient.  Questions are answered to patient's satisfaction.  Her case has been reviewed at neuro tumor conference in January 2024 and the consensus recommendation is to consider stereotactic radiosurgery given the evidence of significant progression of meningioma.  The options of surgery, clinical observation, and radiation therapy has again discussed with the patient.  The pros and cons of different options has also been discussed with patient.  Questions are answered to her satisfaction.  After long discussion, the patient elected proceed with stereotactic radiosurgery as her treatment choice for her meningioma being aware of potential risks and side effects involved.  She is scheduled to return to radiation oncology next week for simulation.  I plan to give her  radiation therapy to a total dose of 1154-5833 cGy in 5 treatments targeted to the meningioma possibly including a smaller lesion using SBRT.    Again, thank you very much for the referral and allowing me to participate in the care of this patient.  If you have any questions or concerns about this consultation, please do not hesitate to call.  I spent approximately 60 minutes today with the patient and 80% time was used for counseling.      Sincerely,          Kary Rust MD, PhD  Department of Radiation Oncology   [facility] Radiation Oncology  Tel: [phone]  Page: [phone]    [facility]  [address] [place] [address]     [place] [id number] [place] [address]    CC:  Patient Care Team:  Masood Melton MD as PCP - General  Elieser Melton MD as MD (Surgery)  Masood Melton MD as Assigned PCP  Rossy Baig NP as Nurse Practitioner  Lionel Guerin MD as Assigned Neuroscience Provider  Kary Rust MD as MD (Radiation Oncology)

## 2024-01-29 ENCOUNTER — ALLIED HEALTH/NURSE VISIT (OUTPATIENT)
Dept: RADIATION ONCOLOGY | Facility: HOSPITAL | Age: 69
End: 2024-01-29
Attending: SURGERY
Payer: MEDICARE

## 2024-01-29 PROCEDURE — 77334 RADIATION TREATMENT AID(S): CPT | Performed by: RADIOLOGY

## 2024-01-29 PROCEDURE — 77334 RADIATION TREATMENT AID(S): CPT | Mod: 26 | Performed by: RADIOLOGY

## 2024-01-29 NOTE — PROCEDURES
SIMULATION NOTE:     DIAGNOSIS:  Clinical diagnosis of meningioma involving the planum sphenoidale managing 2.8 cm with MRI evidence of increased significantly in size from CT of 2015.       INDICATION:  After discussion with patient about various treatment options, the patient elected to proceed with definitive radiation therapy using SBRT technique for meningioma. The patient  is here for simulation.        CONSENT:  The possible risks and side effects of radiation therapy have been discussed with the patient in detail and at great length.  Questions were answered to patient's satisfaction.  Written consent was obtained.       SIMULATION:   The patient is in the supine position with a head rest and face mask to help keep the same position during daily radiation therapy.  Tentative isocenter is set in the center of the lower head region.  We will acquire CT information to help us better locate target and design the radiation therapy field.  We are also going to fuse the diagnostic MRI scan with our planning CT to help us to better outline the target.       BLOCKS:  Custom blocks will be drawn to minimize radiation to normal tissues and to protect normal organs including, but not to, spinal cord, brainstem, eyes, cranial nerves, brain, bone and soft tissue.       DOSAGE:  I plan to give him radiation therapy to a total dose of 9784-5200 cGy in 5 treatments targeted to the meningioma possibly including a smaller lesion using SBRT.       Kary Rust MD, PhD  Department of Radiation Oncology   [facility] Radiation Oncology  Tel: [phone]  Page: [phone]    [facility]  [address] [place] [address]     [facility]  [address]  [place] [address]

## 2024-01-29 NOTE — PROCEDURES
Brain SRS/SBRT Special Treatment Procedure Note  Date of Service: 1/29/2024     Diagnosis: Clinical diagnosis of meningioma involving the planum sphenoidale managing 2.8 cm with MRI evidence of increased significantly in size from CT of 2015.      Medical Indication: To escalate the radiotherapy dose to a curative dose (6542-4147 cGy) using stereotactic radiotherapy technique and to spare surrounding eyes, cranial nerves, brain and brainstem, spinal cord, oral and nasal mucosa, bone and soft tissue from excessive toxicity. SRS/SBRT planning was completed today to prepare for the treatment. SBRT planning is chosen to avoid the critical structures, which cannot be done adequately with conventional planning due to the dose constraints of the normal surrounding critical organs. In addition, the treatment will be high dose per fraction with complete course to be done in 1-5 session each tumor. The patient previously had CT scan acquisition for planning and special treatment aids used include. The patient was simulated in a supine position. After the contouring of the GTV, a clinical tumor volume (CTV), expanded volume of planned treatment volume (PTV) was carried out on the treatment planning system. Critical structures outlined included eyes, cranial nerves, brain and brainstem, spinal cord, oral and nasal mucosa. Extra efforts during the planning process included contouring of critical structures, GTV, CTV, PTV and evaluating the DVH and coverage of the PTV.     The patient is going to have SRS/SBRT technique for her brain metastasis. I have explained to the patient this is a very complicated process which will require an extensive amount of time for planning, delivering and monitoring the patient. The patient understands well and wished to proceed.       Kary Rust MD, PhD  Department of Radiation Oncology   [facility] Radiation Oncology  Tel: [phone]  Page: [phone]    [facility]  [address]  [place] [address]     [facility]  [address]   [place] [address]

## 2024-01-29 NOTE — PROCEDURES
Brain SRS/SBRT: Clinical Treatment Planning Note     1/29/2024     The complex radiotherapy planning will be completed for her brain meningioma. The patient had a planning CT earlier today for planning. The treatment aids were used for planning, including head rest and face mask to help keep the same position during the daily radiation therapy. The therapy planning is necessary to reduce radiotherapy dose to the normal critical organs which are not possible with simple treatment. In addition, dose to the target and the critical structures requires three-dimensional analysis of the isodose distribution. The planning will be done to reduce dose to the eyes, cranial nerves, brain and brainstem, spinal cord, oral and nasal mucosa, bone and soft tissue.     I will contour the clinical tumor volume  CTV , with expanded volume of planning treatment volume  PTV  on the treatment planning system. The critical structures will be outlined, including spinal cord, brainstem, eyes, cranial nerves, brain, bone and soft tissue.     Treatment planning will be done on the computer treatment planning system. The SRS/SBRT technique will be used to achieve optimal coverage of the target volume. Dose distribution to the above critical structures will be reviewed. Isodose distribution along with the X, Y, Z plan will also be reviewed. The beam's eye views will be reviewed and the digital reconstructed image will be reviewed on the planning software. The patient will receive 6049-4761 cGy in 5 treatment targeted to the meningioma using 6 MV photons by SRS/SBRT.      Kary Rust MD, PhD  Department of Radiation Oncology   [facility] Radiation Oncology  Tel: [phone]  Page: [phone]    [facility]  [address] [place] [address]     [address]  [place] [address]

## 2024-02-02 ENCOUNTER — APPOINTMENT (OUTPATIENT)
Dept: RADIATION ONCOLOGY | Facility: CLINIC | Age: 69
End: 2024-02-02
Attending: RADIOLOGY
Payer: MEDICARE

## 2024-02-02 PROCEDURE — 77338 DESIGN MLC DEVICE FOR IMRT: CPT | Mod: 26 | Performed by: RADIOLOGY

## 2024-02-02 PROCEDURE — 77338 DESIGN MLC DEVICE FOR IMRT: CPT | Performed by: RADIOLOGY

## 2024-02-02 PROCEDURE — 77300 RADIATION THERAPY DOSE PLAN: CPT | Mod: 26 | Performed by: RADIOLOGY

## 2024-02-02 PROCEDURE — 77301 RADIOTHERAPY DOSE PLAN IMRT: CPT | Mod: 26 | Performed by: RADIOLOGY

## 2024-02-02 PROCEDURE — 77300 RADIATION THERAPY DOSE PLAN: CPT | Performed by: RADIOLOGY

## 2024-02-02 PROCEDURE — 77301 RADIOTHERAPY DOSE PLAN IMRT: CPT | Performed by: RADIOLOGY

## 2024-02-07 ENCOUNTER — APPOINTMENT (OUTPATIENT)
Dept: RADIATION ONCOLOGY | Facility: CLINIC | Age: 69
End: 2024-02-07
Attending: RADIOLOGY
Payer: MEDICARE

## 2024-02-07 PROCEDURE — 77373 STRTCTC BDY RAD THER TX DLVR: CPT | Performed by: RADIOLOGY

## 2024-02-08 ENCOUNTER — APPOINTMENT (OUTPATIENT)
Dept: RADIATION ONCOLOGY | Facility: HOSPITAL | Age: 69
End: 2024-02-08
Attending: RADIOLOGY
Payer: MEDICARE

## 2024-02-08 PROCEDURE — 77370 RADIATION PHYSICS CONSULT: CPT | Performed by: RADIOLOGY

## 2024-02-09 ENCOUNTER — APPOINTMENT (OUTPATIENT)
Dept: RADIATION ONCOLOGY | Facility: CLINIC | Age: 69
End: 2024-02-09
Attending: RADIOLOGY
Payer: MEDICARE

## 2024-02-09 PROCEDURE — 77373 STRTCTC BDY RAD THER TX DLVR: CPT | Performed by: RADIOLOGY

## 2024-02-12 ENCOUNTER — APPOINTMENT (OUTPATIENT)
Dept: RADIATION ONCOLOGY | Facility: CLINIC | Age: 69
End: 2024-02-12
Attending: RADIOLOGY
Payer: MEDICARE

## 2024-02-12 PROCEDURE — 77373 STRTCTC BDY RAD THER TX DLVR: CPT | Performed by: STUDENT IN AN ORGANIZED HEALTH CARE EDUCATION/TRAINING PROGRAM

## 2024-02-14 ENCOUNTER — OFFICE VISIT (OUTPATIENT)
Dept: RADIATION ONCOLOGY | Facility: CLINIC | Age: 69
End: 2024-02-14
Attending: RADIOLOGY
Payer: MEDICARE

## 2024-02-14 VITALS
OXYGEN SATURATION: 94 % | HEART RATE: 90 BPM | DIASTOLIC BLOOD PRESSURE: 77 MMHG | TEMPERATURE: 98 F | SYSTOLIC BLOOD PRESSURE: 139 MMHG | RESPIRATION RATE: 16 BRPM

## 2024-02-14 DIAGNOSIS — D32.9 MENINGIOMA (H): Primary | ICD-10-CM

## 2024-02-14 PROCEDURE — 77373 STRTCTC BDY RAD THER TX DLVR: CPT | Performed by: RADIOLOGY

## 2024-02-14 ASSESSMENT — PAIN SCALES - GENERAL: PAINLEVEL: NO PAIN (0)

## 2024-02-14 NOTE — LETTER
2024         RE: Corinna Farias  [address]  [place] [address]        Dear Colleague,    Thank you for referring your patient, Corinna Farias, to the [facility] RADIATION ONCOLOGY [place]. Please see a copy of my visit note below.    RADIATION ONCOLOGY WEEKLY TREATMENT VISIT NOTE      Assessment / Impression       Visit Dx:  (D32.9) Meningioma (H)  (primary encounter diagnosis)    Tolerating radiation therapy well.  All questions and concerns addressed.    Plan:     Continue radiation treatment as prescribed.    Subjective:      HPI: Corinna Farias is a 68 year old female with  Meningioma (H) [D32.9]    The following portions of the patient's history were reviewed and updated as appropriate: allergies, current medications, past family history, past medical history, past social history, past surgical history and problem list.    Assessment                  Body Site:  Brain Site: Brain  Stereotactic Radiosurgery: Yes  Concurrent Therapy: No  Today's Dose: 2400  Today's Fraction/Total Fraction Brain: 4/5  Ocular/Visual - other : 0: Normal  Middle ear/hearin: Normal  Tinnitus: 0: No  Depressed level of consciousness: 0: Normal  Oriented x of spheres: 3  Neuropathy - motor: 1: Subjective weakness but no objective findings (fingers & feet d/t diabetes)  Ataxia: 0: Normal  Speech Impairment (e.g. Dysphasia or aphasia): 0: Normal  Seizures: 0: None  Urinary Incontinence: 0: None  Bowel Incontinence: 0: None  Insomnia: 0: Normal                                   Sexuality Alteration                    Emotional Alteration    Copin: Effective  Comfort Alteration   KPS: 70% Cannot do active work, but can care for self  Fatigue (ONS scale): 0: No Fatigue  Pain Location: denies any headaches  Pain Intensity. Rate degree of pain ranging from 0 (no pain) to 10 (severe pain): 0   Nutrition Alteration   Anorexia: 0: None  Nausea: 0: None  Vomitin: None  Skin Alteration    Problem: Activity Intolerance  Goal: # Functional status is maintained or returned to baseline  Note: Patient at baseline mobility.  Will continue to monitor.         Skin Sensation: 0: No problem  Skin Reaction: 0: None  Alopecia: 0: Normal  AUA Assessment                                           Accompanied by       Objective:     Exam: no Erythema.    Vitals:    02/14/24 1146   BP: 139/77   Pulse: 90   Resp: 16   Temp: 98  F (36.7  C)   TempSrc: Oral   SpO2: 94%   PainSc: No Pain (0)       Wt Readings from Last 8 Encounters:   01/23/24 (!) 176.8 kg (389 lb 12.8 oz)   01/16/24 (!) 175.1 kg (386 lb)   01/08/24 (!) 175.1 kg (386 lb)   07/12/23 (!) 173.9 kg (383 lb 4.8 oz)   07/06/23 (!) 173.3 kg (382 lb)   01/30/23 (!) 174.2 kg (384 lb)   01/10/23 (!) 173.3 kg (382 lb)   07/12/22 (!) 169.8 kg (374 lb 4.8 oz)       General: Alert and oriented, in no acute distress  Corinna has no Erythema.  Aria chart and setup information reviewed    Kary Rust MD      Again, thank you for allowing me to participate in the care of your patient.        Sincerely,        Kary Rust MD

## 2024-02-16 ENCOUNTER — ALLIED HEALTH/NURSE VISIT (OUTPATIENT)
Dept: RADIATION ONCOLOGY | Facility: CLINIC | Age: 69
End: 2024-02-16
Attending: RADIOLOGY
Payer: MEDICARE

## 2024-02-16 PROCEDURE — 77373 STRTCTC BDY RAD THER TX DLVR: CPT | Performed by: RADIOLOGY

## 2024-02-16 PROCEDURE — 77435 SBRT MANAGEMENT: CPT | Performed by: RADIOLOGY

## 2024-02-16 PROCEDURE — 77336 RADIATION PHYSICS CONSULT: CPT | Performed by: RADIOLOGY

## 2024-02-16 NOTE — PROGRESS NOTES
SBRT/SRS Treatment Summary    Patient: Corinna Farias   MRN: [id number]  : 1955  Care Provider: Kary Rust MD    Date of Service: 2024      Lionel Guerin MD  [facility] NEUROSURGERY  [address] [place] [address]            Dear Dr. Guerin:     Your patient Mrs. Corinna Thomson completed her radiation therapy on 2024. As you know Ms. Farias is a 68 year old female with a clinical diagnosis of meningioma involving the planum sphenoidale managing 2.8 cm with MRI evidence of increased significantly in size from CT of .  The patient elected proceed with definitive radiation therapy for her meningioma and had radiation therapy in our clinic with a total dose of 3000 cGy in 5 treatments given from 2024 - 2024.  She tolerated radiation therapy well with minimal side effect.  The patient is scheduled to return to radiation oncology in 4-6 weeks for routine post therapy office follow-up.    Again, thank you very much for the referral and allowing me to participate in the care of this patient.  If you have any questions or concerns about this patient, please do not hesitate to call.          Sincerely,    Kary Rust MD, PhD  Department of Radiation Oncology   [facility] Radiation Oncology  Tel: [phone]  Page: [phone]    [facility]  [address] [place] [address]     [facility]  [address] [place] [address]   [place] [address]    CC:  Patient Care Team:  Masood Melton MD as PCP - General  Elieser Melton MD as MD (Surgery)  Masood Melton MD as Assigned PCP  Rossy Baig, SENTHIL as Nurse Practitioner  Lionel Guerin MD as Assigned Neuroscience Provider  Kary Rust MD as MD (Radiation Oncology)  Kary Rust MD as Assigned Cancer Care Provider

## 2024-02-16 NOTE — PROGRESS NOTES
Patient here per wheelchair for final radiation therapy treatment for her meningioma.  Written discharge instructions reviewed and given to patient.  Follow-up with Dr. Rust in about 4 to 6 weeks.  Patient able to verbalize understanding and left with scheduling team to make appointments on her way out of the clinic.

## 2024-02-27 ENCOUNTER — TELEPHONE (OUTPATIENT)
Dept: RADIATION ONCOLOGY | Facility: CLINIC | Age: 69
End: 2024-02-27
Payer: MEDICARE

## 2024-02-27 NOTE — TELEPHONE ENCOUNTER
Called pt at this time for a routine post radiation follow up call. I had to leave a message; told pt there is no need to call us back if they are feeling fine, but if they do have any questions or concerns to please call.  phone number provided.

## 2024-03-18 ENCOUNTER — OFFICE VISIT (OUTPATIENT)
Dept: RADIATION ONCOLOGY | Facility: HOSPITAL | Age: 69
End: 2024-03-18
Attending: RADIOLOGY
Payer: MEDICARE

## 2024-03-18 VITALS
OXYGEN SATURATION: 95 % | RESPIRATION RATE: 20 BRPM | SYSTOLIC BLOOD PRESSURE: 143 MMHG | DIASTOLIC BLOOD PRESSURE: 81 MMHG | HEART RATE: 92 BPM | BODY MASS INDEX: 64.62 KG/M2 | WEIGHT: 293 LBS

## 2024-03-18 DIAGNOSIS — E11.8 TYPE 2 DIABETES MELLITUS WITH COMPLICATION, WITH LONG-TERM CURRENT USE OF INSULIN (H): ICD-10-CM

## 2024-03-18 DIAGNOSIS — Z79.4 TYPE 2 DIABETES MELLITUS WITH COMPLICATION, WITH LONG-TERM CURRENT USE OF INSULIN (H): ICD-10-CM

## 2024-03-18 DIAGNOSIS — D32.9 MENINGIOMA (H): Primary | ICD-10-CM

## 2024-03-18 PROCEDURE — G0463 HOSPITAL OUTPT CLINIC VISIT: HCPCS | Performed by: RADIOLOGY

## 2024-03-18 RX ORDER — ATORVASTATIN CALCIUM 20 MG/1
20 TABLET, FILM COATED ORAL DAILY
Qty: 90 TABLET | Refills: 3 | Status: SHIPPED | OUTPATIENT
Start: 2024-03-18

## 2024-03-18 ASSESSMENT — PAIN SCALES - GENERAL: PAINLEVEL: NO PAIN (0)

## 2024-03-18 NOTE — PROGRESS NOTES
[facility] Radiation Oncology Follow Up     Patient: Corinna Farias  MRN: [id number]  Date of Service: 03/18/2024       DISEASE TREATED:  Clinical diagnosis of meningioma involving the planum sphenoidale managing 2.8 cm with MRI evidence of increased significantly in size from CT of 2015.        TYPE OF RADIATION THERAPY ADMINISTERED:  3000 cGy in 5 treatments given from 2/7/2024 - 2/16/2024.      INTERVAL SINCE COMPLETION OF RADIATION THERAPY: 1 months.      SUBJECTIVE:  Ms. Farias is a 68 year old female who  is diabetic and takes insulin and is morbidly obese (390 lbs) with a BMI of 64.  Patient was found to have meningioma by recent MRI scan for stroke evaluation.  The MRI brain 12/4/2023  showed meningioma along the planum sphenoidale. This measures approximately 2.8 cm anteriorly posteriorly by 2.2 cm transversely by 2.0 cm craniocaudally on the current study. There is no surrounding edema. On the MRI from 08/27/2023 without contrast the meningioma measured approximately 2.3 cm anteriorly posteriorly by 2.2 cm transversely by 1.6 cm craniocaudally. On the CT from 08/30/2015 it is very hard to discretely visualize the meningioma. Retrospectively there may be a tiny meningioma approximately 5 mm x 7 mm as seen on axial image #17. No on the current study.  There is also a small tiny meningioma along the inferior right frontal lobe.  Patient is clinically asymptomatic.  Her case has been discussed at neuro tumor conference and the consensus recommendation is to consider stereotactic radiosurgery. The patient elected proceed with definitive radiation therapy for her meningioma and had radiation therapy in our clinic with a total dose of 3000 cGy in 5 treatments given from 2/7/2024 - 2/16/2024. She tolerated radiation therapy well with minimal side effect.     The patient has been recovering well since radiation therapy and denies any pain and discomfort related to the treatment at time of evaluation.  She  is here for routine post therapy office follow-up.    Medications were reviewed and are up to date on EPIC.    The following portions of the patient's history were reviewed and updated as appropriate: allergies, current medications, past family history, past medical history, past social history, past surgical history and problem list.    Review of Systems:      General  Constitutional  Constitutional (WDL): All constitutional elements are within defined limits  EENT  Eye Disorders  Eye Disorder (WDL): All eye disorder elements are within defined limits  Ear Disorders  Ear Disorder (WDL): All ear disorder elements are within defined limits  Respiratory  Respiratory  Respiratory (WDL): All respiratory elements are within defined limits  Cardiovascular  Cardiovascular  Cardiovascular (WDL): All cardiovascular elements are within defined limits  Gastrointestinal  Gastrointestinal  Gastrointestinal (WDL): All gastrointestinal elements are within defined limits  Musculoskeletal  Musculoskeletal and Connective Tissue Disorders  Musculoskeletal & Connective (WDL): All musculoskeletal & connective elements are within defined limits  Integumentary  Integumentary  Integumentary (WDL): All integumentary elements are within defined limits  Neurological  Neurosensory  Neurosensory (WDL): Exceptions to WDL  Ataxia: Asymptomatic OR clinical or diagnostic observations only OR intervention not indicated (Uses a power w/c)  Genitourinary/Reproductive  Genitourinary  Genitourinary (WDL): Assessment not pertinent to visit  Lymphatic  Lymph System Disorders  Lymph (WDL): Assessment not pertinent to visit  Pain  Pain Score: No Pain (0)  AUA Assessment                                                              Accompanied by  Accompanied By: spouse (Ganesh)    Objective:      PHYSICAL EXAMINATION:    BP (!) 143/81 (BP Location: Left arm, Patient Position: Sitting, Cuff Size: Adult Large)   Pulse 92   Resp 20   Wt (!) 176.1 kg (388 lb  4.8 oz)   LMP  (LMP Unknown)   SpO2 95%   BMI 64.62 kg/m      Gen: Alert, in NAD  Eyes: PERRL, EOMI, sclera anicteric  Pulm: No wheezing, stridor or respiratory distress  CV: Well-perfused, no cyanosis, no pedal edema  Back: No step-offs or pain to palpation along the thoracolumbar spine  Rectal: Deferred  : Deferred  Musculoskeletal: Normal muscle bulk and tone  Skin: Normal color and turgor  Neurologic: A/Ox3, CN II-XII intact, normal gait and station  Psychiatric: Appropriate mood and affect      Impression     68 year old female with a clinical diagnosis of meningioma involving the planum sphenoidale managing 2.8 cm with MRI evidence of increased significantly in size from CT of 2015.  The patient elected proceed with definitive radiation therapy for her meningioma and had radiation therapy in our clinic with a total dose of 3000 cGy in 5 treatments given from 2/7/2024 - 2/16/2024.     Assessment & Plan:     1.  The patient has been recovering well since radiation therapy.  She is scheduled to return to radiation oncology in 2 months for office follow-up and restaging MRI brain.    2.  Continue follow-up with Dr. Lionel Guerin, neurosurgery and Dr. Masood Melton, PCP as planned.    Face to face time  15 minutes with > 80% spent on consultation, education and coordination of care.      Kary Rust MD  Department of Radiation Oncology   [facility]  Tel: [phone]  Page: [phone]    [facility]  [address], [place] [address] [place] [address]   [place] [address]    CC:  Patient Care Team:  Masood Melton MD as PCP - General  Elieser Melton MD as MD (Surgery)  Masood Melton MD as Assigned PCP  Rossy Baig NP as Nurse Practitioner  Lionel Guerin MD as Assigned Neuroscience Provider  Kary Rust MD as MD (Radiation Oncology)  Kary Rust MD as Assigned Cancer Care Provider

## 2024-03-18 NOTE — PROGRESS NOTES
"Oncology Rooming Note    March 18, 2024 11:33 AM   Corinna Farias is a 68 year old female who presents for:    Chief Complaint   Patient presents with    Radiation Therapy     Follow up     Initial Vitals: BP (!) 143/81 (BP Location: Left arm, Patient Position: Sitting, Cuff Size: Adult Large)   Pulse 92   Resp 20   Wt (!) 176.1 kg (388 lb 4.8 oz)   LMP  (LMP Unknown)   SpO2 95%   BMI 64.62 kg/m   Estimated body mass index is 64.62 kg/m  as calculated from the following:    Height as of 1/8/24: 1.651 m (5' 5\").    Weight as of this encounter: 176.1 kg (388 lb 4.8 oz). Body surface area is 2.84 meters squared.  No Pain (0) Comment: Data Unavailable   No LMP recorded (lmp unknown). Patient is postmenopausal.  Allergies reviewed: Yes  Medications reviewed: Yes    Medications: Medication refills not needed today.  Pharmacy [facility] entered into Snap[facility]: [id number] [address]    Frailty Screening:   Is the patient here for a new oncology consult visit in cancer care? 2. No      Clinical concerns: Radiation therapy follow up  Dr. Rust was notified.      LETITIA ACOSTA RN             "

## 2024-03-18 NOTE — LETTER
"    3/18/2024         RE: Corinna Farias  [address]  [place] [address]        Dear Colleague,    Thank you for referring your patient, Corinna Farias, to the [facility] RADIATION ONCOLOGY [place]. Please see a copy of my visit note below.    Oncology Rooming Note    March 18, 2024 11:33 AM   Corinna Farias is a 68 year old female who presents for:    Chief Complaint   Patient presents with     Radiation Therapy     Follow up     Initial Vitals: BP (!) 143/81 (BP Location: Left arm, Patient Position: Sitting, Cuff Size: Adult Large)   Pulse 92   Resp 20   Wt (!) 176.1 kg (388 lb 4.8 oz)   LMP  (LMP Unknown)   SpO2 95%   BMI 64.62 kg/m   Estimated body mass index is 64.62 kg/m  as calculated from the following:    Height as of 1/8/24: 1.651 m (5' 5\").    Weight as of this encounter: 176.1 kg (388 lb 4.8 oz). Body surface area is 2.84 meters squared.  No Pain (0) Comment: Data Unavailable   No LMP recorded (lmp unknown). Patient is postmenopausal.  Allergies reviewed: Yes  Medications reviewed: Yes    Medications: Medication refills not needed today.  Pharmacy name entered into Layer: [facility] #[id number] [place] [address]    Frailty Screening:   Is the patient here for a new oncology consult visit in cancer care? 2. No      Clinical concerns: Radiation therapy follow up  Dr. Rust was notified.      LETITIA ACOSTA RN               [facility] Radiation Oncology Follow Up     Patient: Corinna Farias  MRN: [id number]  Date of Service: 03/18/2024       DISEASE TREATED:  Clinical diagnosis of meningioma involving the planum sphenoidale managing 2.8 cm with MRI evidence of increased significantly in size from CT of 2015.        TYPE OF RADIATION THERAPY ADMINISTERED:  3000 cGy in 5 treatments given from 2/7/2024 - 2/16/2024.      INTERVAL SINCE COMPLETION OF RADIATION THERAPY: 1 months.      SUBJECTIVE:  Ms. Farias is a " 68 year old female who  is diabetic and takes insulin and is morbidly obese (390 lbs) with a BMI of 64.  Patient was found to have meningioma by recent MRI scan for stroke evaluation.  The MRI brain 12/4/2023  showed meningioma along the planum sphenoidale. This measures approximately 2.8 cm anteriorly posteriorly by 2.2 cm transversely by 2.0 cm craniocaudally on the current study. There is no surrounding edema. On the MRI from 08/27/2023 without contrast the meningioma measured approximately 2.3 cm anteriorly posteriorly by 2.2 cm transversely by 1.6 cm craniocaudally. On the CT from 08/30/2015 it is very hard to discretely visualize the meningioma. Retrospectively there may be a tiny meningioma approximately 5 mm x 7 mm as seen on axial image #17. No on the current study.  There is also a small tiny meningioma along the inferior right frontal lobe.  Patient is clinically asymptomatic.  Her case has been discussed at neuro tumor conference and the consensus recommendation is to consider stereotactic radiosurgery. The patient elected proceed with definitive radiation therapy for her meningioma and had radiation therapy in our clinic with a total dose of 3000 cGy in 5 treatments given from 2/7/2024 - 2/16/2024. She tolerated radiation therapy well with minimal side effect.     The patient has been recovering well since radiation therapy and denies any pain and discomfort related to the treatment at time of evaluation.  She is here for routine post therapy office follow-up.    Medications were reviewed and are up to date on EPIC.    The following portions of the patient's history were reviewed and updated as appropriate: allergies, current medications, past family history, past medical history, past social history, past surgical history and problem list.    Review of Systems:      General  Constitutional  Constitutional (WDL): All constitutional elements are within defined limits  EENT  Eye Disorders  Eye Disorder  (WDL): All eye disorder elements are within defined limits  Ear Disorders  Ear Disorder (WDL): All ear disorder elements are within defined limits  Respiratory  Respiratory  Respiratory (WDL): All respiratory elements are within defined limits  Cardiovascular  Cardiovascular  Cardiovascular (WDL): All cardiovascular elements are within defined limits  Gastrointestinal  Gastrointestinal  Gastrointestinal (WDL): All gastrointestinal elements are within defined limits  Musculoskeletal  Musculoskeletal and Connective Tissue Disorders  Musculoskeletal & Connective (WDL): All musculoskeletal & connective elements are within defined limits  Integumentary  Integumentary  Integumentary (WDL): All integumentary elements are within defined limits  Neurological  Neurosensory  Neurosensory (WDL): Exceptions to WDL  Ataxia: Asymptomatic OR clinical or diagnostic observations only OR intervention not indicated (Uses a power w/c)  Genitourinary/Reproductive  Genitourinary  Genitourinary (WDL): Assessment not pertinent to visit  Lymphatic  Lymph System Disorders  Lymph (WDL): Assessment not pertinent to visit  Pain  Pain Score: No Pain (0)  AUA Assessment                                                              Accompanied by  Accompanied By: spouse (Ganesh)    Objective:      PHYSICAL EXAMINATION:    BP (!) 143/81 (BP Location: Left arm, Patient Position: Sitting, Cuff Size: Adult Large)   Pulse 92   Resp 20   Wt (!) 176.1 kg (388 lb 4.8 oz)   LMP  (LMP Unknown)   SpO2 95%   BMI 64.62 kg/m      Gen: Alert, in NAD  Eyes: PERRL, EOMI, sclera anicteric  Pulm: No wheezing, stridor or respiratory distress  CV: Well-perfused, no cyanosis, no pedal edema  Back: No step-offs or pain to palpation along the thoracolumbar spine  Rectal: Deferred  : Deferred  Musculoskeletal: Normal muscle bulk and tone  Skin: Normal color and turgor  Neurologic: A/Ox3, CN II-XII intact, normal gait and station  Psychiatric: Appropriate mood and  affect      Impression     68 year old female with a clinical diagnosis of meningioma involving the planum sphenoidale managing 2.8 cm with MRI evidence of increased significantly in size from CT of 2015.  The patient elected proceed with definitive radiation therapy for her meningioma and had radiation therapy in our clinic with a total dose of 3000 cGy in 5 treatments given from 2/7/2024 - 2/16/2024.     Assessment & Plan:     1.  The patient has been recovering well since radiation therapy.  She is scheduled to return to radiation oncology in 2 months for office follow-up and restaging MRI brain.    2.  Continue follow-up with Dr. Lionel Guerin, neurosurgery and Dr. Masood Melton, PCP as planned.    Face to face time  15 minutes with > 80% spent on consultation, education and coordination of care.      Kary Rust MD  Department of Radiation Oncology   [facility]  Tel: [phone]  Page: [phone]    [facility]  [address] [place] [address]     [place] [id number] [place] [address]    CC:  Patient Care Team:  Masood Melton MD as PCP - General  Elieser Melton MD as MD (Surgery)  Masood Melton MD as Assigned PCP  Rossy Baig NP as Nurse Practitioner  Lionel Guerin MD as Assigned Neuroscience Provider  Kary Rust MD as MD (Radiation Oncology)  Kary Rust MD as Assigned Cancer Care Provider      Again, thank you for allowing me to participate in the care of your patient.        Sincerely,        Kary Rust MD

## 2024-03-22 ENCOUNTER — TELEPHONE (OUTPATIENT)
Dept: ENDOCRINOLOGY | Facility: CLINIC | Age: 69
End: 2024-03-22
Payer: MEDICARE

## 2024-03-22 DIAGNOSIS — E10.69 TYPE 1 DIABETES MELLITUS WITH OTHER SPECIFIED COMPLICATION (H): ICD-10-CM

## 2024-03-22 RX ORDER — INSULIN LISPRO 100 [IU]/ML
INJECTION, SOLUTION INTRAVENOUS; SUBCUTANEOUS
Qty: 90 ML | Refills: 0 | Status: SHIPPED | OUTPATIENT
Start: 2024-03-22 | End: 2024-06-20

## 2024-03-22 NOTE — TELEPHONE ENCOUNTER
Health Call Center    Phone Message    May a detailed message be left on voicemail: yes     Reason for Call: Medication Refill Request    Has the patient contacted the pharmacy for the refill? Yes   Name of medication being requested: Humalog  Provider who prescribed the medication: Rossy Baig NP   Pharmacy: [facility] #[id number] [address]   Date medication is needed: ASAP

## 2024-03-29 DIAGNOSIS — E10.69 TYPE 1 DIABETES MELLITUS WITH OTHER SPECIFIED COMPLICATION (H): ICD-10-CM

## 2024-03-29 RX ORDER — INSULIN GLARGINE 100 [IU]/ML
INJECTION, SOLUTION SUBCUTANEOUS
Qty: 115 ML | Refills: 0 | Status: SHIPPED | OUTPATIENT
Start: 2024-03-29 | End: 2024-07-10

## 2024-03-29 NOTE — TELEPHONE ENCOUNTER
Requested Prescriptions   Signed Prescriptions Disp Refills    insulin glargine (LANTUS SOLOSTAR) 100 UNIT/ML pen 115 mL 0     Sig: INJECT 65 UNITS UNDER THE SKIN EVERY MORNING AND INJECT 55 UNITS EVERY EVENING       Insulin Protocol Failed - 3/29/2024  3:49 PM        Failed - Has GFR on file in past 12 months and most recent value is normal        Failed - Chart Review Required     Review Chart.    Do not approve if insulin is used in a pump.  Instead, direct refill request to the patient's endocrinologist.  If the patient doesn't have an endocrinologist, then send the refill to the patient's PCP for review            Passed - Medication is active on med list        Passed - Recent (6 mo) or future (90 days) visit within the authorizing provider's specialty     The patient must have completed an in-person or virtual visit within the past 6 months or has a future visit scheduled within the next 90 days with the authorizing provider s specialty.  Urgent care and e-visits do not quality as an office visit for this protocol.          Passed - Medication indicated for associated diagnosis     Medication is associated with one or more of the following diagnoses:   - Type 1 diabetes mellitus  - Type 2 diabetes mellitus  - Diabetic nephropathy; Prophylaxis  - Neuropathy due to diabetes mellitus; Prophylaxis  - Retinopathy due to diabetes mellitus; Prophylaxis  - Diabetes mellitus associated with cystic fibrosis  - Disorder of cardiovascular system; Prophylaxis - Type 1 diabetes mellitus   - Disorder of cardiovascular system; Prophylaxis - Type 2 diabetes mellitus            Passed - Patient is 18 years of age or older       Long Acting Insulin Protocol Passed - 3/29/2024  3:49 PM        Passed - Serum creatinine on file in past 12 months     Recent Labs   Lab Test 01/08/24  0909   CR 1.10*                 Passed - HgbA1C in past 3 or 6 months     If HgbA1C is 8 or greater, it needs to be on file within the past 3 months.   "If less than 8, must be on file within the past 6 months.     Recent Labs   Lab Test 01/08/24  0750   A1C 6.9*             Passed - Medication is active on med list        Passed - Patient is age 18 or older        Passed - Recent (6 mo) or future (30 days) visit within the authorizing provider's specialty     Patient had office visit in the last 6 months or has a visit in the next 30 days with authorizing provider or within the authorizing provider's specialty.  See \"Patient Info\" tab in inbasket, or \"Choose Columns\" in Meds & Orders section of the refill encounter.                 "

## 2024-04-01 ENCOUNTER — TELEPHONE (OUTPATIENT)
Dept: INTERNAL MEDICINE | Facility: CLINIC | Age: 69
End: 2024-04-01
Payer: MEDICARE

## 2024-04-01 DIAGNOSIS — Z79.4 TYPE 2 DIABETES MELLITUS WITH MICROALBUMINURIA, WITH LONG-TERM CURRENT USE OF INSULIN (H): ICD-10-CM

## 2024-04-01 DIAGNOSIS — R80.9 TYPE 2 DIABETES MELLITUS WITH MICROALBUMINURIA, WITH LONG-TERM CURRENT USE OF INSULIN (H): ICD-10-CM

## 2024-04-01 DIAGNOSIS — E11.29 TYPE 2 DIABETES MELLITUS WITH MICROALBUMINURIA, WITH LONG-TERM CURRENT USE OF INSULIN (H): ICD-10-CM

## 2024-04-01 RX ORDER — FLASH GLUCOSE SENSOR
KIT MISCELLANEOUS
Qty: 6 EACH | Refills: 0 | Status: SHIPPED | OUTPATIENT
Start: 2024-04-01 | End: 2024-07-03

## 2024-04-01 NOTE — TELEPHONE ENCOUNTER
Updated Freestyle Waldemar 14 day sensor Rx for Medicare compliance.     Iron Scott, PharmD  [facility]

## 2024-04-22 DIAGNOSIS — K21.9 GASTROESOPHAGEAL REFLUX DISEASE, UNSPECIFIED WHETHER ESOPHAGITIS PRESENT: ICD-10-CM

## 2024-04-22 RX ORDER — PANTOPRAZOLE SODIUM 40 MG/1
40 TABLET, DELAYED RELEASE ORAL DAILY
Qty: 90 TABLET | Refills: 1 | Status: SHIPPED | OUTPATIENT
Start: 2024-04-22

## 2024-04-29 DIAGNOSIS — G47.33 OSA (OBSTRUCTIVE SLEEP APNEA): ICD-10-CM

## 2024-04-29 RX ORDER — TRAZODONE HYDROCHLORIDE 100 MG/1
TABLET ORAL
Qty: 90 TABLET | Refills: 1 | Status: SHIPPED | OUTPATIENT
Start: 2024-04-29 | End: 2024-10-30

## 2024-05-13 ENCOUNTER — HOSPITAL ENCOUNTER (OUTPATIENT)
Dept: MRI IMAGING | Facility: HOSPITAL | Age: 69
Discharge: HOME | End: 2024-05-13
Attending: RADIOLOGY | Admitting: RADIOLOGY
Payer: MEDICARE

## 2024-05-13 DIAGNOSIS — D32.9 MENINGIOMA (H): ICD-10-CM

## 2024-05-13 PROCEDURE — 255N000002 HC RX 255 OP 636: Performed by: RADIOLOGY

## 2024-05-13 PROCEDURE — 70553 MRI BRAIN STEM W/O & W/DYE: CPT | Mod: MG

## 2024-05-13 PROCEDURE — A9585 GADOBUTROL INJECTION: HCPCS | Performed by: RADIOLOGY

## 2024-05-13 RX ORDER — GADOBUTROL 604.72 MG/ML
17 INJECTION INTRAVENOUS ONCE
Status: COMPLETED | OUTPATIENT
Start: 2024-05-13 | End: 2024-05-13

## 2024-05-13 RX ADMIN — GADOBUTROL 17 ML: 604.72 INJECTION INTRAVENOUS at 13:39

## 2024-05-20 ENCOUNTER — OFFICE VISIT (OUTPATIENT)
Dept: RADIATION ONCOLOGY | Facility: HOSPITAL | Age: 69
End: 2024-05-20
Attending: RADIOLOGY
Payer: MEDICARE

## 2024-05-20 VITALS
RESPIRATION RATE: 20 BRPM | TEMPERATURE: 98.2 F | SYSTOLIC BLOOD PRESSURE: 154 MMHG | DIASTOLIC BLOOD PRESSURE: 70 MMHG | OXYGEN SATURATION: 93 % | HEART RATE: 90 BPM

## 2024-05-20 DIAGNOSIS — D32.9 MENINGIOMA (H): Primary | ICD-10-CM

## 2024-05-20 PROCEDURE — 99213 OFFICE O/P EST LOW 20 MIN: CPT | Performed by: RADIOLOGY

## 2024-05-20 PROCEDURE — G2211 COMPLEX E/M VISIT ADD ON: HCPCS | Performed by: RADIOLOGY

## 2024-05-20 PROCEDURE — G0463 HOSPITAL OUTPT CLINIC VISIT: HCPCS | Performed by: RADIOLOGY

## 2024-05-20 ASSESSMENT — PAIN SCALES - GENERAL: PAINLEVEL: NO PAIN (0)

## 2024-05-20 NOTE — PROGRESS NOTES
[facility] Radiation Oncology Follow Up     Patient: Corinna Farias  MRN: [id number]  Date of Service: 05/20/2024       DISEASE TREATED:  Clinical diagnosis of meningioma involving the planum sphenoidale managing 2.8 cm with MRI evidence of increased significantly in size from CT of 2015.        TYPE OF RADIATION THERAPY ADMINISTERED:  3000 cGy in 5 treatments given from 2/7/2024 - 2/16/2024.      INTERVAL SINCE COMPLETION OF RADIATION THERAPY: 3 months.      SUBJECTIVE:  Ms. Farias is a 68 year old female who  is diabetic and takes insulin and is morbidly obese (390 lbs) with a BMI of 64.  Patient was found to have meningioma by recent MRI scan for stroke evaluation.  The MRI brain 12/4/2023  showed meningioma along the planum sphenoidale. This measures approximately 2.8 cm anteriorly posteriorly by 2.2 cm transversely by 2.0 cm craniocaudally on the current study. There is no surrounding edema. On the MRI from 08/27/2023 without contrast the meningioma measured approximately 2.3 cm anteriorly posteriorly by 2.2 cm transversely by 1.6 cm craniocaudally. On the CT from 08/30/2015 it is very hard to discretely visualize the meningioma. Retrospectively there may be a tiny meningioma approximately 5 mm x 7 mm as seen on axial image #17. No on the current study.  There is also a small tiny meningioma along the inferior right frontal lobe.  Patient is clinically asymptomatic.  Her case has been discussed at neuro tumor conference and the consensus recommendation is to consider stereotactic radiosurgery. The patient elected proceed with definitive radiation therapy for her meningioma and had radiation therapy in our clinic with a total dose of 3000 cGy in 5 treatments given from 2/7/2024 - 2/16/2024. She tolerated radiation therapy well with minimal side effect.      The patient has been recovering well since radiation therapy and denies any pain and discomfort related to the treatment at time of evaluation.  She  is here for routine post therapy office follow-up.     Medications were reviewed and are up to date on EPIC.    The following portions of the patient's history were reviewed and updated as appropriate: allergies, current medications, past family history, past medical history, past social history, past surgical history and problem list.    Review of Systems:      General  Constitutional  Constitutional (WDL): All constitutional elements are within defined limits  EENT  Eye Disorders  Eye Disorder (WDL): All eye disorder elements are within defined limits  Ear Disorders  Ear Disorder (WDL): All ear disorder elements are within defined limits  Respiratory  Respiratory  Respiratory (WDL): Exceptions to WDL  Dyspnea: Shortness of breath with minimal exertion OR limiting instrumental ADL  Cardiovascular  Cardiovascular  Cardiovascular (WDL): All cardiovascular elements are within defined limits  Gastrointestinal  Gastrointestinal  Gastrointestinal (WDL): All gastrointestinal elements are within defined limits  Musculoskeletal  Musculoskeletal and Connective Tissue Disorders  Musculoskeletal & Connective (WDL): All musculoskeletal & connective elements are within defined limits  Integumentary  Integumentary  Integumentary (WDL): All integumentary elements are within defined limits  Neurological  Neurosensory  Neurosensory (WDL): Exceptions to WDL  Ataxia: Asymptomatic OR clinical or diagnostic observations only OR intervention not indicated (Uses a power w/c)  Peripheral Sensory Neuropathy: Moderate symptoms OR limiting instrumental ADL  Genitourinary/Reproductive  Genitourinary  Genitourinary (WDL): Assessment not pertinent to visit  Lymphatic  Lymph System Disorders  Lymph (WDL): Assessment not pertinent to visit  Pain  Pain Score: No Pain (0)  AUA Assessment                                                              Accompanied by  Accompanied By: spouse (Ganesh)    Objective:     PHYSICAL EXAMINATION:    BP (!)  154/70 (BP Location: Left arm, Patient Position: Sitting)   Pulse 90   Temp 98.2  F (36.8  C)   Resp 20   LMP  (LMP Unknown)   SpO2 93%     Gen: Alert, in NAD  Eyes: PERRL, EOMI, sclera anicteric  Pulm: No wheezing, stridor or respiratory distress  CV: Well-perfused, no cyanosis, no pedal edema  Back: No step-offs or pain to palpation along the thoracolumbar spine  Rectal: Deferred  : Deferred  Musculoskeletal: Normal muscle bulk and tone  Skin: Normal color and turgor  Neurologic: A/Ox3, CN II-XII intact, normal gait and station  Psychiatric: Appropriate mood and affect     Imaging: Imaging results 30 days: MRI Brain w & w/o contrast    Result Date: 5/14/2024  EXAM: MR BRAIN W/O and W CONTRAST LOCATION: [facility] DATE: 5/13/2024 INDICATION: Meningioma (H). COMPARISON: MRI brain dated 12/4/2023. MRI brain dated 8/27/2023. CONTRAST: 17 mL Gadavist. TECHNIQUE: Routine multiplanar multisequence head MRI without and with intravenous contrast. FINDINGS: INTRACRANIAL CONTENTS: No acute or subacute infarct. Again seen is a T1 isointense, T2/FLAIR hyperintense midline planum sphenoidale enhancing mass lesion consistent with a meningioma measuring 2.7 cm in AP dimensions, 2.2 cm in transverse dimensions and  approximately 1.8 cm in craniocaudal dimensions, previously measuring 2.2 cm in transverse dimensions, 2.8 cm in AP dimensions and 1.8 cm in craniocaudal dimensions. There is minimal adjacent vasogenic edema, slightly increased compared to exams from 08/27/2023 and 12/4/2023.  Stable small focus of nodular enhancement along the right inferior frontal lobe measuring up to 0.4 cm (image 93 series 5005). No new mass lesions identified. There is no acute hemorrhage or abnormal extra-axial fluid collection. Scattered nonspecific T2/FLAIR hyperintensities within the cerebral white matter most consistent with mild chronic microvascular ischemic change. Normal ventricles and sulci. Normal  position of the cerebellar tonsils. SELLA: No abnormality accounting for technique. OSSEOUS STRUCTURES/SOFT TISSUES: Normal marrow signal. The major intracranial vascular flow voids are maintained. ORBITS: Prior bilateral cataract surgery. Visualized portions of the orbits are otherwise unremarkable. SINUSES/MASTOIDS: No paranasal sinus mucosal disease. Partial right mastoid effusion.     IMPRESSION: 1.  Stable in size meningioma along the planum sphenoidale with slight interval increase in surrounding vasogenic edema. 2.  Stable probable tiny meningioma along the right inferior frontal lobe. 3.  No acute hemorrhage, acute infarct or abnormal extra-axial fluid collection. 4.  Mild chronic small vessel ischemic change. 5.  Partial right mastoid effusion.      Impression     68 year old female with a clinical diagnosis of meningioma involving the planum sphenoidale managing 2.8 cm with MRI evidence of increased significantly in size from CT of 2015.  The patient elected proceed with definitive radiation therapy for her meningioma and had radiation therapy in our clinic with a total dose of 3000 cGy in 5 treatments given from 2/7/2024 - 2/16/2024.     Assessment & Plan:     1.  I have personally reviewed her restaging MRI scan and compared to the previous MRI scan and the radiation therapy field.  There is no evidence of recurrence.  The patient is scheduled return to radiation oncology in 4 months for office follow-up and MRI scan.     2.  Continue follow-up with Dr. Lionel Guerin, neurosurgery and Dr. Masood Melton, PCP as planned.     Face to face time 20 minutes with > 80% spent on consultation, education and coordination of care.      Kary Rust MD  Department of Radiation Oncology   [facility]  Tel: [phone]  Page: [phone]    [facility]  [address] [place] [address]     [place] [id number] [facility] [place] [address]    CC:  Patient Care Team:  Masood Melton MD as  PCP - General  Elieser Melton MD as MD (Surgery)  Masood Melton MD as Assigned PCP  Rossy Baig NP as Nurse Practitioner  Lionel Guerin MD as Assigned Neuroscience Provider  Kary Rust MD as MD (Radiation Oncology)  Kary Rust MD as Assigned Cancer Care Provider

## 2024-05-20 NOTE — PROGRESS NOTES
"Oncology Rooming Note    May 20, 2024 10:26 AM   Corinna Farias is a 68 year old female who presents for:    Chief Complaint   Patient presents with    Oncology Clinic Visit     Follow up     Initial Vitals: BP (!) 154/70 (BP Location: Left arm, Patient Position: Sitting)   Pulse 90   Temp 98.2  F (36.8  C)   Resp 20   LMP  (LMP Unknown)   SpO2 93%  Estimated body mass index is 64.62 kg/m  as calculated from the following:    Height as of 1/8/24: 1.651 m (5' 5\").    Weight as of 3/18/24: 176.1 kg (388 lb 4.8 oz). There is no height or weight on file to calculate BSA.  No Pain (0) Comment: Data Unavailable   No LMP recorded (lmp unknown). Patient is postmenopausal.  Allergies reviewed: Yes  Medications reviewed: Yes    Medications: Medication refills not needed today.  Pharmacy name entered into [employer]: [facility] [place] [address]    Frailty Screening:   Is the patient here for a new oncology consult visit in cancer care? 2. No      Clinical concerns: Follow up, MRI done 5/13.  Dr. Rust was notified.      Nancy Green RN              "

## 2024-05-20 NOTE — LETTER
5/20/2024         RE: Corinna Farias  [address]  [place] [address]        Dear Colleague,    Thank you for referring your patient, Corinna Farias, to the [facility] RADIATION ONCOLOGY [place]. Please see a copy of my visit note below.    [facility] Radiation Oncology Follow Up     Patient: Corinna Farias  MRN: [id number]  Date of Service: 05/20/2024       DISEASE TREATED:  Clinical diagnosis of meningioma involving the planum sphenoidale managing 2.8 cm with MRI evidence of increased significantly in size from CT of 2015.        TYPE OF RADIATION THERAPY ADMINISTERED:  3000 cGy in 5 treatments given from 2/7/2024 - 2/16/2024.      INTERVAL SINCE COMPLETION OF RADIATION THERAPY: 3 months.      SUBJECTIVE:  Ms. Farias is a 68 year old female who  is diabetic and takes insulin and is morbidly obese (390 lbs) with a BMI of 64.  Patient was found to have meningioma by recent MRI scan for stroke evaluation.  The MRI brain 12/4/2023  showed meningioma along the planum sphenoidale. This measures approximately 2.8 cm anteriorly posteriorly by 2.2 cm transversely by 2.0 cm craniocaudally on the current study. There is no surrounding edema. On the MRI from 08/27/2023 without contrast the meningioma measured approximately 2.3 cm anteriorly posteriorly by 2.2 cm transversely by 1.6 cm craniocaudally. On the CT from 08/30/2015 it is very hard to discretely visualize the meningioma. Retrospectively there may be a tiny meningioma approximately 5 mm x 7 mm as seen on axial image #17. No on the current study.  There is also a small tiny meningioma along the inferior right frontal lobe.  Patient is clinically asymptomatic.  Her case has been discussed at neuro tumor conference and the consensus recommendation is to consider stereotactic radiosurgery. The patient elected proceed with definitive radiation therapy for her meningioma and had radiation therapy in our clinic with a total dose of 3000  cGy in 5 treatments given from 2/7/2024 - 2/16/2024. She tolerated radiation therapy well with minimal side effect.      The patient has been recovering well since radiation therapy and denies any pain and discomfort related to the treatment at time of evaluation.  She is here for routine post therapy office follow-up.     Medications were reviewed and are up to date on EPIC.    The following portions of the patient's history were reviewed and updated as appropriate: allergies, current medications, past family history, past medical history, past social history, past surgical history and problem list.    Review of Systems:      General  Constitutional  Constitutional (WDL): All constitutional elements are within defined limits  EENT  Eye Disorders  Eye Disorder (WDL): All eye disorder elements are within defined limits  Ear Disorders  Ear Disorder (WDL): All ear disorder elements are within defined limits  Respiratory  Respiratory  Respiratory (WDL): Exceptions to WDL  Dyspnea: Shortness of breath with minimal exertion OR limiting instrumental ADL  Cardiovascular  Cardiovascular  Cardiovascular (WDL): All cardiovascular elements are within defined limits  Gastrointestinal  Gastrointestinal  Gastrointestinal (WDL): All gastrointestinal elements are within defined limits  Musculoskeletal  Musculoskeletal and Connective Tissue Disorders  Musculoskeletal & Connective (WDL): All musculoskeletal & connective elements are within defined limits  Integumentary  Integumentary  Integumentary (WDL): All integumentary elements are within defined limits  Neurological  Neurosensory  Neurosensory (WDL): Exceptions to WDL  Ataxia: Asymptomatic OR clinical or diagnostic observations only OR intervention not indicated (Uses a power w/c)  Peripheral Sensory Neuropathy: Moderate symptoms OR limiting instrumental ADL  Genitourinary/Reproductive  Genitourinary  Genitourinary (WDL): Assessment not pertinent to visit  Lymphatic  Lymph System  Disorders  Lymph (WDL): Assessment not pertinent to visit  Pain  Pain Score: No Pain (0)  AUA Assessment                                                              Accompanied by  Accompanied By: spouse (Ganesh)    Objective:     PHYSICAL EXAMINATION:    BP (!) 154/70 (BP Location: Left arm, Patient Position: Sitting)   Pulse 90   Temp 98.2  F (36.8  C)   Resp 20   LMP  (LMP Unknown)   SpO2 93%     Gen: Alert, in NAD  Eyes: PERRL, EOMI, sclera anicteric  Pulm: No wheezing, stridor or respiratory distress  CV: Well-perfused, no cyanosis, no pedal edema  Back: No step-offs or pain to palpation along the thoracolumbar spine  Rectal: Deferred  : Deferred  Musculoskeletal: Normal muscle bulk and tone  Skin: Normal color and turgor  Neurologic: A/Ox3, CN II-XII intact, normal gait and station  Psychiatric: Appropriate mood and affect     Imaging: Imaging results 30 days: MRI Brain w & w/o contrast    Result Date: 5/14/2024  EXAM: MR BRAIN W/O and W CONTRAST LOCATION: [facility] DATE: 5/13/2024 INDICATION: Meningioma (H). COMPARISON: MRI brain dated 12/4/2023. MRI brain dated 8/27/2023. CONTRAST: 17 mL Gadavist. TECHNIQUE: Routine multiplanar multisequence head MRI without and with intravenous contrast. FINDINGS: INTRACRANIAL CONTENTS: No acute or subacute infarct. Again seen is a T1 isointense, T2/FLAIR hyperintense midline planum sphenoidale enhancing mass lesion consistent with a meningioma measuring 2.7 cm in AP dimensions, 2.2 cm in transverse dimensions and  approximately 1.8 cm in craniocaudal dimensions, previously measuring 2.2 cm in transverse dimensions, 2.8 cm in AP dimensions and 1.8 cm in craniocaudal dimensions. There is minimal adjacent vasogenic edema, slightly increased compared to exams from 08/27/2023 and 12/4/2023.  Stable small focus of nodular enhancement along the right inferior frontal lobe measuring up to 0.4 cm (image 93 series 5005). No new mass lesions  identified. There is no acute hemorrhage or abnormal extra-axial fluid collection. Scattered nonspecific T2/FLAIR hyperintensities within the cerebral white matter most consistent with mild chronic microvascular ischemic change. Normal ventricles and sulci. Normal position of the cerebellar tonsils. SELLA: No abnormality accounting for technique. OSSEOUS STRUCTURES/SOFT TISSUES: Normal marrow signal. The major intracranial vascular flow voids are maintained. ORBITS: Prior bilateral cataract surgery. Visualized portions of the orbits are otherwise unremarkable. SINUSES/MASTOIDS: No paranasal sinus mucosal disease. Partial right mastoid effusion.     IMPRESSION: 1.  Stable in size meningioma along the planum sphenoidale with slight interval increase in surrounding vasogenic edema. 2.  Stable probable tiny meningioma along the right inferior frontal lobe. 3.  No acute hemorrhage, acute infarct or abnormal extra-axial fluid collection. 4.  Mild chronic small vessel ischemic change. 5.  Partial right mastoid effusion.      Impression     68 year old female with a clinical diagnosis of meningioma involving the planum sphenoidale managing 2.8 cm with MRI evidence of increased significantly in size from CT of 2015.  The patient elected proceed with definitive radiation therapy for her meningioma and had radiation therapy in our clinic with a total dose of 3000 cGy in 5 treatments given from 2/7/2024 - 2/16/2024.     Assessment & Plan:     1.  I have personally reviewed her restaging MRI scan and compared to the previous MRI scan and the radiation therapy field.  There is no evidence of recurrence.  The patient is scheduled return to radiation oncology in 4 months for office follow-up and MRI scan.     2.  Continue follow-up with Dr. Lionel Guerin, neurosurgery and Dr. Masood Melton, PCP as planned.     Face to face time 20 minutes with > 80% spent on consultation, education and coordination of care.      Kary Rust,  "MD  Department of Radiation Oncology   [facility]  Tel: [phone]  Page: [phone]    [facility]  [address]  [place] [address]     [facility]   [address] [facility] [place] [address]    CC:  Patient Care Team:  Masood Melton MD as PCP - General  Elieser Melton MD as MD (Surgery)  Masood Melton MD as Assigned PCP  Rossy Baig NP as Nurse Practitioner  Lionel Guerin MD as Assigned Neuroscience Provider  Kary Rust MD as MD (Radiation Oncology)  Kary Rust MD as Assigned Cancer Care Provider      Oncology Rooming Note    May 20, 2024 10:26 AM   Corinna Farias is a 68 year old female who presents for:    Chief Complaint   Patient presents with     Oncology Clinic Visit     Follow up     Initial Vitals: BP (!) 154/70 (BP Location: Left arm, Patient Position: Sitting)   Pulse 90   Temp 98.2  F (36.8  C)   Resp 20   LMP  (LMP Unknown)   SpO2 93%  Estimated body mass index is 64.62 kg/m  as calculated from the following:    Height as of 1/8/24: 1.651 m (5' 5\").    Weight as of 3/18/24: 176.1 kg (388 lb 4.8 oz). There is no height or weight on file to calculate BSA.  No Pain (0) Comment: Data Unavailable   No LMP recorded (lmp unknown). Patient is postmenopausal.  Allergies reviewed: Yes  Medications reviewed: Yes    Medications: Medication refills not needed today.  Pharmacy name entered into [employer]: [id number] [address]    Frailty Screening:   Is the patient here for a new oncology consult visit in cancer care? 2. No      Clinical concerns: Follow up, MRI done 5/13.  Dr. Rust was notified.      Nancy Green RN                Again, thank you for allowing me to participate in the care of your patient.        Sincerely,        Kary Rust MD  "

## 2024-05-28 DIAGNOSIS — E55.9 VITAMIN D DEFICIENCY: ICD-10-CM

## 2024-05-29 RX ORDER — MULTIVITAMIN WITH FOLIC ACID 400 MCG
1 TABLET ORAL DAILY
Qty: 90 TABLET | Refills: 1 | Status: SHIPPED | OUTPATIENT
Start: 2024-05-29

## 2024-06-20 DIAGNOSIS — E10.69 TYPE 1 DIABETES MELLITUS WITH OTHER SPECIFIED COMPLICATION (H): ICD-10-CM

## 2024-06-20 RX ORDER — INSULIN LISPRO 100 [IU]/ML
INJECTION, SOLUTION INTRAVENOUS; SUBCUTANEOUS
Qty: 30 ML | Refills: 0 | Status: SHIPPED | OUTPATIENT
Start: 2024-06-20 | End: 2024-08-26

## 2024-06-20 NOTE — TELEPHONE ENCOUNTER
Requested Prescriptions   Pending Prescriptions Disp Refills    HUMALOG KWIKPEN 100 UNIT/ML soln [Pharmacy Med Name: HUMALOG 100 U/ML KWIK PEN INJ 3ML] 90 mL 0     Sig: USE AS DIRECTED UP TO THREE TIMES DAILY WITH MEALS( UP  UNITS PER DAY)       Insulin Protocol Failed - 6/20/2024 10:49 AM        Failed - Has GFR on file in past 12 months and most recent value is normal        Failed - Chart Review Required     Review Chart.    Do not approve if insulin is used in a pump.  Instead, direct refill request to the patient's endocrinologist.  If the patient doesn't have an endocrinologist, then send the refill to the patient's PCP for review            Passed - Medication is active on med list        Passed - Recent (6 mo) or future (90 days) visit within the authorizing provider's specialty     The patient must have completed an in-person or virtual visit within the past 6 months or has a future visit scheduled within the next 90 days with the authorizing provider s specialty.  Urgent care and e-visits do not quality as an office visit for this protocol.          Passed - Medication indicated for associated diagnosis     Medication is associated with one or more of the following diagnoses:   - Type 1 diabetes mellitus  - Type 2 diabetes mellitus  - Diabetic nephropathy; Prophylaxis  - Neuropathy due to diabetes mellitus; Prophylaxis  - Retinopathy due to diabetes mellitus; Prophylaxis  - Diabetes mellitus associated with cystic fibrosis  - Disorder of cardiovascular system; Prophylaxis - Type 1 diabetes mellitus   - Disorder of cardiovascular system; Prophylaxis - Type 2 diabetes mellitus            Passed - Patient is 18 years of age or older

## 2024-06-22 DIAGNOSIS — E10.69 TYPE 1 DIABETES MELLITUS WITH OTHER SPECIFIED COMPLICATION (H): ICD-10-CM

## 2024-06-24 RX ORDER — INSULIN LISPRO 100 [IU]/ML
INJECTION, SOLUTION INTRAVENOUS; SUBCUTANEOUS
Qty: 30 ML | Refills: 0 | OUTPATIENT
Start: 2024-06-24

## 2024-06-24 NOTE — TELEPHONE ENCOUNTER
Spoke to pt and informed rx was denied as it was a duplicate request. Rx was sent on 6/20 already.  E-Prescribing Status: Receipt confirmed by pharmacy (6/20/2024 10:55 AM CDT)

## 2024-06-24 NOTE — TELEPHONE ENCOUNTER
Requested Prescriptions   Pending Prescriptions Disp Refills    HUMALOG KWIKPEN 100 UNIT/ML soln [Pharmacy Med Name: HUMALOG 100 U/ML KWIK PEN INJ 3ML] 30 mL 0     Sig: INJECT UNDER THE SKIN AS DIRECTED THREE TIMES DAILY WITH MEALS(MAXIMUM  UNITS PER DAY)       Insulin Protocol Failed - 6/22/2024  8:00 AM        Failed - Has GFR on file in past 12 months and most recent value is normal        Failed - Chart Review Required     Review Chart.    Do not approve if insulin is used in a pump.  Instead, direct refill request to the patient's endocrinologist.  If the patient doesn't have an endocrinologist, then send the refill to the patient's PCP for review            Passed - Medication is active on med list        Passed - Recent (6 mo) or future (90 days) visit within the authorizing provider's specialty     The patient must have completed an in-person or virtual visit within the past 6 months or has a future visit scheduled within the next 90 days with the authorizing provider s specialty.  Urgent care and e-visits do not quality as an office visit for this protocol.          Passed - Medication indicated for associated diagnosis     Medication is associated with one or more of the following diagnoses:   - Type 1 diabetes mellitus  - Type 2 diabetes mellitus  - Diabetic nephropathy; Prophylaxis  - Neuropathy due to diabetes mellitus; Prophylaxis  - Retinopathy due to diabetes mellitus; Prophylaxis  - Diabetes mellitus associated with cystic fibrosis  - Disorder of cardiovascular system; Prophylaxis - Type 1 diabetes mellitus   - Disorder of cardiovascular system; Prophylaxis - Type 2 diabetes mellitus            Passed - Patient is 18 years of age or older

## 2024-06-26 NOTE — TELEPHONE ENCOUNTER
Patient calling stating her pharmacy states they have not received an updated script for Humolog. Patient states pharmacy needs new script due to the many denials. Please send new script.

## 2024-07-01 DIAGNOSIS — E55.9 VITAMIN D DEFICIENCY: ICD-10-CM

## 2024-07-01 RX ORDER — VITAMIN B COMPLEX
2 TABLET ORAL DAILY
Qty: 180 TABLET | Refills: 1 | Status: SHIPPED | OUTPATIENT
Start: 2024-07-01

## 2024-07-08 ENCOUNTER — LAB (OUTPATIENT)
Dept: LAB | Facility: CLINIC | Age: 69
End: 2024-07-08
Payer: MEDICARE

## 2024-07-08 DIAGNOSIS — N18.31 TYPE 1 DIABETES MELLITUS WITH STAGE 3A CHRONIC KIDNEY DISEASE (H): ICD-10-CM

## 2024-07-08 DIAGNOSIS — E10.22 TYPE 1 DIABETES MELLITUS WITH STAGE 3A CHRONIC KIDNEY DISEASE (H): ICD-10-CM

## 2024-07-08 DIAGNOSIS — R82.90 ABNORMAL URINALYSIS: ICD-10-CM

## 2024-07-08 LAB
ANION GAP SERPL CALCULATED.3IONS-SCNC: 7 MMOL/L (ref 7–15)
BUN SERPL-MCNC: 17.5 MG/DL (ref 8–23)
CALCIUM SERPL-MCNC: 9.4 MG/DL (ref 8.8–10.2)
CHLORIDE SERPL-SCNC: 102 MMOL/L (ref 98–107)
CREAT SERPL-MCNC: 1.02 MG/DL (ref 0.51–0.95)
DEPRECATED HCO3 PLAS-SCNC: 26 MMOL/L (ref 22–29)
EGFRCR SERPLBLD CKD-EPI 2021: 60 ML/MIN/1.73M2
GLUCOSE SERPL-MCNC: 214 MG/DL (ref 70–99)
HBA1C MFR BLD: 6.9 % (ref 0–5.6)
POTASSIUM SERPL-SCNC: 4.5 MMOL/L (ref 3.4–5.3)
SODIUM SERPL-SCNC: 135 MMOL/L (ref 135–145)

## 2024-07-08 PROCEDURE — 83036 HEMOGLOBIN GLYCOSYLATED A1C: CPT

## 2024-07-08 PROCEDURE — 80048 BASIC METABOLIC PNL TOTAL CA: CPT

## 2024-07-08 PROCEDURE — 36415 COLL VENOUS BLD VENIPUNCTURE: CPT

## 2024-07-09 ENCOUNTER — OFFICE VISIT (OUTPATIENT)
Dept: INTERNAL MEDICINE | Facility: CLINIC | Age: 69
End: 2024-07-09
Payer: MEDICARE

## 2024-07-09 VITALS
SYSTOLIC BLOOD PRESSURE: 130 MMHG | RESPIRATION RATE: 20 BRPM | DIASTOLIC BLOOD PRESSURE: 80 MMHG | HEART RATE: 90 BPM | BODY MASS INDEX: 48.82 KG/M2 | TEMPERATURE: 98.2 F | WEIGHT: 293 LBS | OXYGEN SATURATION: 96 % | HEIGHT: 65 IN

## 2024-07-09 DIAGNOSIS — I10 ESSENTIAL HYPERTENSION, BENIGN: ICD-10-CM

## 2024-07-09 DIAGNOSIS — Z29.11 NEED FOR VACCINATION AGAINST RESPIRATORY SYNCYTIAL VIRUS: ICD-10-CM

## 2024-07-09 DIAGNOSIS — E10.22 TYPE 1 DIABETES MELLITUS WITH STAGE 3A CHRONIC KIDNEY DISEASE (H): Primary | ICD-10-CM

## 2024-07-09 DIAGNOSIS — Z78.0 MENOPAUSE: ICD-10-CM

## 2024-07-09 DIAGNOSIS — R20.0 BILATERAL HAND NUMBNESS: ICD-10-CM

## 2024-07-09 DIAGNOSIS — N18.31 TYPE 1 DIABETES MELLITUS WITH STAGE 3A CHRONIC KIDNEY DISEASE (H): Primary | ICD-10-CM

## 2024-07-09 DIAGNOSIS — N18.31 STAGE 3A CHRONIC KIDNEY DISEASE (H): ICD-10-CM

## 2024-07-09 DIAGNOSIS — D32.9 MENINGIOMA (H): ICD-10-CM

## 2024-07-09 DIAGNOSIS — E55.9 VITAMIN D DEFICIENCY: ICD-10-CM

## 2024-07-09 DIAGNOSIS — E10.69 TYPE 1 DIABETES MELLITUS WITH OTHER SPECIFIED COMPLICATION (H): ICD-10-CM

## 2024-07-09 LAB
ALBUMIN UR-MCNC: NEGATIVE MG/DL
AMORPH CRY #/AREA URNS HPF: ABNORMAL /HPF
APPEARANCE UR: ABNORMAL
BACTERIA #/AREA URNS HPF: ABNORMAL /HPF
BILIRUB UR QL STRIP: NEGATIVE
COLOR UR AUTO: YELLOW
GLUCOSE UR STRIP-MCNC: NEGATIVE MG/DL
HGB UR QL STRIP: NEGATIVE
KETONES UR STRIP-MCNC: NEGATIVE MG/DL
LEUKOCYTE ESTERASE UR QL STRIP: ABNORMAL
NITRATE UR QL: NEGATIVE
PH UR STRIP: 5.5 [PH] (ref 5–8)
RBC #/AREA URNS AUTO: ABNORMAL /HPF
SP GR UR STRIP: >=1.03 (ref 1–1.03)
SQUAMOUS #/AREA URNS AUTO: ABNORMAL /LPF
UROBILINOGEN UR STRIP-ACNC: 0.2 E.U./DL
WBC #/AREA URNS AUTO: ABNORMAL /HPF

## 2024-07-09 PROCEDURE — 90480 ADMN SARSCOV2 VAC 1/ONLY CMP: CPT | Performed by: INTERNAL MEDICINE

## 2024-07-09 PROCEDURE — 91320 SARSCV2 VAC 30MCG TRS-SUC IM: CPT | Performed by: INTERNAL MEDICINE

## 2024-07-09 PROCEDURE — 81001 URINALYSIS AUTO W/SCOPE: CPT

## 2024-07-09 PROCEDURE — 99214 OFFICE O/P EST MOD 30 MIN: CPT | Mod: 25 | Performed by: INTERNAL MEDICINE

## 2024-07-09 RX ORDER — RESPIRATORY SYNCYTIAL VIRUS VACCINE 120MCG/0.5
0.5 KIT INTRAMUSCULAR ONCE
Qty: 1 EACH | Refills: 0 | Status: SHIPPED | OUTPATIENT
Start: 2024-07-09 | End: 2024-07-09

## 2024-07-09 NOTE — PROGRESS NOTES
1. Type 1 diabetes mellitus with stage 3a chronic kidney disease (H)  Excellent control.  Management per endocrinology.    2. Vitamin D deficiency  Due bone density.    3. Meningioma (H)  Status post radiation.  Continue close follow-up with neurosurgery.    4. Menopause  She has never had a bone density.  Will try to get that scheduled.  - DEXA HIP/PELVIS/SPINE - Future; Future    5. Need for vaccination against respiratory syncytial virus  She did have her RSV vaccine this fall    6. Essential hypertension, benign  Blood pressure well-controlled with lisinopril.  Continue same.    7. Stage 3a chronic kidney disease (H)  Continue good sugar control and lisinopril.    8. Bilateral hand numbness  She notes hand numbness with certain activities.  Likely carpal tunnel.  I recommended carpal tunnel splints.  Offered hand surgery evaluation and she declines.  She will let me know if she changes her mind.    The longitudinal plan of care for the diagnosis(es)/condition(s) as documented were addressed during this visit. Due to the added complexity in care, I will continue to support Corinna in the subsequent management and with ongoing continuity of care.    Subjective   Corinna is a 68 year old, presenting for the following health issues:  Diabetes (6 months follow up - not fasting )    History of Present Illness       Hypertension: She presents for follow up of hypertension.  She does not check blood pressure  regularly outside of the clinic. Outpatient blood pressures have not been over 140/90. She does not follow a low salt diet.     She eats 0-1 servings of fruits and vegetables daily.She consumes 2 sweetened beverage(s) daily.She exercises with enough effort to increase her heart rate 20 to 29 minutes per day.  She exercises with enough effort to increase her heart rate 3 or less days per week.   She is taking medications regularly.     Corinna comes in today for follow-up.  Morbidly obese woman with type 1 diabetes  "mellitus.  She is followed by endocrine.  On large dose of insulin.  She also had a meningioma recently radiated.  Following with neurosurgery regarding this.  She would like COVID booster today.  Denies other somatic concerns for me.  She has sleep apnea but refuses CPAP.                Objective    /80 (BP Location: Right arm, Patient Position: Sitting, Cuff Size: Adult Regular)   Pulse 90   Temp 98.2  F (36.8  C) (Tympanic)   Resp 20   Ht 1.651 m (5' 5\")   Wt (!) 180.1 kg (397 lb)   LMP  (LMP Unknown)   SpO2 96%   BMI 66.06 kg/m    Body mass index is 66.06 kg/m .  Physical Exam   Pleasant obese woman in no distress.            Signed Electronically by: MAGALI JEAN-BAPTISTE MD    "

## 2024-07-10 RX ORDER — INSULIN GLARGINE 100 [IU]/ML
INJECTION, SOLUTION SUBCUTANEOUS
Qty: 30 ML | Refills: 0 | Status: SHIPPED | OUTPATIENT
Start: 2024-07-10 | End: 2024-07-16

## 2024-07-10 NOTE — TELEPHONE ENCOUNTER
Requested Prescriptions   Pending Prescriptions Disp Refills    LANTUS SOLOSTAR 100 UNIT/ML soln [Pharmacy Med Name: LANTUS SOLOSTAR PEN INJ 3ML] 111 mL      Sig: INJECT 65 UNITS UNDER THE SKIN EVERY MORNING AND INJECT 55 UNITS EVERY EVENING       Insulin Protocol Failed - 7/9/2024  4:20 PM        Failed - Has GFR on file in past 12 months and most recent value is normal        Failed - Chart Review Required     Review Chart.    Do not approve if insulin is used in a pump.  Instead, direct refill request to the patient's endocrinologist.  If the patient doesn't have an endocrinologist, then send the refill to the patient's PCP for review            Passed - Medication is active on med list        Passed - Recent (6 mo) or future (90 days) visit within the authorizing provider's specialty     The patient must have completed an in-person or virtual visit within the past 6 months or has a future visit scheduled within the next 90 days with the authorizing provider s specialty.  Urgent care and e-visits do not quality as an office visit for this protocol.          Passed - Medication indicated for associated diagnosis     Medication is associated with one or more of the following diagnoses:   - Type 1 diabetes mellitus  - Type 2 diabetes mellitus  - Diabetic nephropathy; Prophylaxis  - Neuropathy due to diabetes mellitus; Prophylaxis  - Retinopathy due to diabetes mellitus; Prophylaxis  - Diabetes mellitus associated with cystic fibrosis  - Disorder of cardiovascular system; Prophylaxis - Type 1 diabetes mellitus   - Disorder of cardiovascular system; Prophylaxis - Type 2 diabetes mellitus            Passed - Patient is 18 years of age or older

## 2024-07-14 DIAGNOSIS — I10 ESSENTIAL HYPERTENSION, BENIGN: ICD-10-CM

## 2024-07-15 RX ORDER — LISINOPRIL 20 MG/1
20 TABLET ORAL DAILY
Qty: 90 TABLET | Refills: 2 | Status: SHIPPED | OUTPATIENT
Start: 2024-07-15

## 2024-07-16 ENCOUNTER — OFFICE VISIT (OUTPATIENT)
Dept: ENDOCRINOLOGY | Facility: CLINIC | Age: 69
End: 2024-07-16
Payer: MEDICARE

## 2024-07-16 VITALS
WEIGHT: 293 LBS | HEART RATE: 100 BPM | DIASTOLIC BLOOD PRESSURE: 68 MMHG | SYSTOLIC BLOOD PRESSURE: 134 MMHG | BODY MASS INDEX: 66.06 KG/M2 | OXYGEN SATURATION: 98 %

## 2024-07-16 DIAGNOSIS — E10.69 TYPE 1 DIABETES MELLITUS WITH OTHER SPECIFIED COMPLICATION (H): ICD-10-CM

## 2024-07-16 PROCEDURE — 99214 OFFICE O/P EST MOD 30 MIN: CPT | Performed by: NURSE PRACTITIONER

## 2024-07-16 RX ORDER — INSULIN GLARGINE 100 [IU]/ML
INJECTION, SOLUTION SUBCUTANEOUS
Qty: 75 ML | Refills: 3 | Status: SHIPPED | OUTPATIENT
Start: 2024-07-16

## 2024-07-16 NOTE — LETTER
"7/16/2024      Corinna Farias  [address]  [place] [address]      Dear Colleague,    Thank you for referring your patient, Corinna Farias, to the [facility]. Please see a copy of my visit note below.    [facility] ENDOCRINOLOGY    Diabetes Note 7/16/2024    Corinna Farias, 1955, [id number]          Reason for visit      1. Type 1 diabetes mellitus with other specified complication (H)        HPI     Corinna Farias is a very pleasant 68 year old old female who presents for follow up.  SUMMARY:    Corinna is here today in follow-up for DM 1. Her current A1c is 6.9 and the same as her previous. She is using the [employer] CGM, which was downloaded today and data was reviewed.     TIR was 73%, Above, 21% and below, 6%. There is no pattern to her hypoglycemia. She feels as though, because of the way that she is following, that she has not had as many high readings. Her download supports this.     She is taking 65 units of Basal insulin in the morning and 55 units in the evening. She takes prandial insulin according to what she is eating and in correction as needed.    She reports that the mass that was found incidentally in her Brain has been irradiated, and that they are happy with the treatment. She denies any headaches.     No current problems regarding her feet. She has been having some occasional hand numbness when gripping things tightly. She doesn't usually have long term numbness and can usually \"shake it out\", and the numbness resolves.         Blood glucose data:      Past Medical History     Patient Active Problem List   Diagnosis     Bariatric surgery status (GASTRIC BYPASS)     Vitamin D deficiency     Essential hypertension, benign     GERD (gastroesophageal reflux disease)     Hypothyroidism     Microalbuminuric diabetic nephropathy (H)     Morbid obesity (H)     GIOVANI (obstructive sleep apnea)--will not use CPAP     Chronic kidney disease, stage 3     " Cervical cancer screening     Encounter for long-term (current) use of insulin (H)     Female climacteric state     Insomnia     Pseudophakia     Type 1 diabetes mellitus (H)     Meningioma (H)        Family History       family history includes Diabetes in her father and mother.    Social History      reports that she has never smoked. She has never used smokeless tobacco. She reports that she does not drink alcohol and does not use drugs.      Review of Systems     Patient has no polyuria or polydipsia, no chest pain, dyspnea or TIA's, no numbness, tingling or pain in extremities  Remainder negative except as noted in HPI.    Vital Signs     /68 (BP Location: Right arm, Patient Position: Sitting, Cuff Size: Adult Large)   Pulse 100   Wt (!) 180.1 kg (397 lb)   LMP  (LMP Unknown)   SpO2 98%   BMI 66.06 kg/m    Wt Readings from Last 3 Encounters:   07/16/24 (!) 180.1 kg (397 lb)   07/09/24 (!) 180.1 kg (397 lb)   03/18/24 (!) 176.1 kg (388 lb 4.8 oz)       Physical Exam     Constitutional:  Well developed, Well nourished  HENT:  Normocephalic,   Neck: Thyroid normal, No lymph nodes, Supple  Eyes:  PERRL, Conjunctiva pink  Respiratory:  Normal breath sounds, No respiratory distress  Cardiovascular:  Normal heart rate, Normal rhythm, No murmurs  GI:  Bowel sounds normal, Soft, No tenderness  Musculoskeletal:  No gross deformity or lesions, normal dorsalis pedis pulses  Skin: No acanthosis nigricans, lipoatrophy or lipodystrophy  Neurologic:  Alert & oriented x 3, nonfocal  Psychiatric:  Affect, Mood, Insight appropriate  Diabetic foot exam: no ulcers, charcot's or high risk calluses,      Assessment     1. Type 1 diabetes mellitus with other specified complication (H)        Plan       Corinna's control is stable. She will continue with her current medication regimen, and will follow-up with me in 6 months..         Rossy Baig NP   Endocrinology  7/16/2024  7:21 AM      Lab Results     Microalbumin  Urine mg/dL   Date Value Ref Range Status   10/15/2020 6.40 (H) 0.00 - 1.99 mg/dL Final       Cholesterol   Date Value Ref Range Status   01/08/2024 141 <200 mg/dL Final     Direct Measure HDL   Date Value Ref Range Status   01/08/2024 52 >=50 mg/dL Final     Triglycerides   Date Value Ref Range Status   01/08/2024 92 <150 mg/dL Final       [unfilled]      Current Medications     Outpatient Medications Prior to Visit   Medication Sig Dispense Refill     ASPIRIN PO Take 81 mg by mouth daily       atorvastatin (LIPITOR) 20 MG tablet Take 1 tablet (20 mg) by mouth daily 90 tablet 3     Continuous Glucose Sensor (FREESTYLE AUSTIN 14 DAY SENSOR) MISC CHANGE SENSOR EVERY 14 DAYS AS DIRECTED 6 each 0     HUMALOG KWIKPEN 100 UNIT/ML soln USE AS DIRECTED UP TO THREE TIMES DAILY WITH MEALS( UP  UNITS PER DAY) 30 mL 0     levothyroxine (SYNTHROID/LEVOTHROID) 75 MCG tablet TAKE ONE TABLET AT LEAST ONE HOUR BEFORE OR TWO HOURS AFTER A MEAL 90 tablet 2     lisinopril (ZESTRIL) 20 MG tablet TAKE 1 TABLET(20 MG) BY MOUTH DAILY 90 tablet 2     Multiple Vitamin (DAILY-MICHAEL MULTIVITAMIN) TABS Take 1 tablet by mouth daily 90 tablet 1     pantoprazole (PROTONIX) 40 MG EC tablet Take 1 tablet (40 mg) by mouth daily 90 tablet 1     traZODone (DESYREL) 100 MG tablet TAKE 1 TABLET BY MOUTH AT BEDTIME AS NEEDED FOR SLEEP 90 tablet 1     vitamin D3 25 mcg (1000 units) tablet Take 2 tablets (50 mcg) by mouth daily 180 tablet 1     insulin glargine (LANTUS SOLOSTAR) 100 UNIT/ML pen INJECT 65 UNITS UNDER THE SKIN EVERY MORNING AND INJECT 55 UNITS EVERY EVENING 30 mL 0     No facility-administered medications prior to visit.                                                                           Again, thank you for allowing me to participate in the care of your patient.        Sincerely,        Rossy Baig NP

## 2024-07-16 NOTE — PROGRESS NOTES
"[facility] ENDOCRINOLOGY    Diabetes Note 7/16/2024    Corinna Farias, 1955, [id number]          Reason for visit      1. Type 1 diabetes mellitus with other specified complication (H)        HPI     Corinna Farias is a very pleasant 68 year old old female who presents for follow up.  SUMMARY:    Corinna is here today in follow-up for DM 1. Her current A1c is 6.9 and the same as her previous. She is using the [employer]ar CGM, which was downloaded today and data was reviewed.     TIR was 73%, Above, 21% and below, 6%. There is no pattern to her hypoglycemia. She feels as though, because of the way that she is following, that she has not had as many high readings. Her download supports this.     She is taking 65 units of Basal insulin in the morning and 55 units in the evening. She takes prandial insulin according to what she is eating and in correction as needed.    She reports that the mass that was found incidentally in her Brain has been irradiated, and that they are happy with the treatment. She denies any headaches.     No current problems regarding her feet. She has been having some occasional hand numbness when gripping things tightly. She doesn't usually have long term numbness and can usually \"shake it out\", and the numbness resolves.         Blood glucose data:      Past Medical History     Patient Active Problem List   Diagnosis    Bariatric surgery status (GASTRIC BYPASS)    Vitamin D deficiency    Essential hypertension, benign    GERD (gastroesophageal reflux disease)    Hypothyroidism    Microalbuminuric diabetic nephropathy (H)    Morbid obesity (H)    GIOVANI (obstructive sleep apnea)--will not use CPAP    Chronic kidney disease, stage 3    Cervical cancer screening    Encounter for long-term (current) use of insulin (H)    Female climacteric state    Insomnia    Pseudophakia    Type 1 diabetes mellitus (H)    Meningioma (H)        Family History       family history includes Diabetes in her " father and mother.    Social History      reports that she has never smoked. She has never used smokeless tobacco. She reports that she does not drink alcohol and does not use drugs.      Review of Systems     Patient has no polyuria or polydipsia, no chest pain, dyspnea or TIA's, no numbness, tingling or pain in extremities  Remainder negative except as noted in HPI.    Vital Signs     /68 (BP Location: Right arm, Patient Position: Sitting, Cuff Size: Adult Large)   Pulse 100   Wt (!) 180.1 kg (397 lb)   LMP  (LMP Unknown)   SpO2 98%   BMI 66.06 kg/m    Wt Readings from Last 3 Encounters:   07/16/24 (!) 180.1 kg (397 lb)   07/09/24 (!) 180.1 kg (397 lb)   03/18/24 (!) 176.1 kg (388 lb 4.8 oz)       Physical Exam     Constitutional:  Well developed, Well nourished  HENT:  Normocephalic,   Neck: Thyroid normal, No lymph nodes, Supple  Eyes:  PERRL, Conjunctiva pink  Respiratory:  Normal breath sounds, No respiratory distress  Cardiovascular:  Normal heart rate, Normal rhythm, No murmurs  GI:  Bowel sounds normal, Soft, No tenderness  Musculoskeletal:  No gross deformity or lesions, normal dorsalis pedis pulses  Skin: No acanthosis nigricans, lipoatrophy or lipodystrophy  Neurologic:  Alert & oriented x 3, nonfocal  Psychiatric:  Affect, Mood, Insight appropriate  Diabetic foot exam: no ulcers, charcot's or high risk calluses,      Assessment     1. Type 1 diabetes mellitus with other specified complication (H)        Plan       Corinna's control is stable. She will continue with her current medication regimen, and will follow-up with me in 6 months..         Rossy Baig NP   Endocrinology  7/16/2024  7:21 AM      Lab Results     Microalbumin Urine mg/dL   Date Value Ref Range Status   10/15/2020 6.40 (H) 0.00 - 1.99 mg/dL Final       Cholesterol   Date Value Ref Range Status   01/08/2024 141 <200 mg/dL Final     Direct Measure HDL   Date Value Ref Range Status   01/08/2024 52 >=50 mg/dL Final      Triglycerides   Date Value Ref Range Status   01/08/2024 92 <150 mg/dL Final       [unfilled]      Current Medications     Outpatient Medications Prior to Visit   Medication Sig Dispense Refill    ASPIRIN PO Take 81 mg by mouth daily      atorvastatin (LIPITOR) 20 MG tablet Take 1 tablet (20 mg) by mouth daily 90 tablet 3    Continuous Glucose Sensor (FREESTYLE [place]USTIN 14 DAY SENSOR) MISC CHANGE SENSOR EVERY 14 DAYS AS DIRECTED 6 each 0    HUMALOG KWIKPEN 100 UNIT/ML soln USE AS DIRECTED UP TO THREE TIMES DAILY WITH MEALS( UP  UNITS PER DAY) 30 mL 0    levothyroxine (SYNTHROID/LEVOTHROID) 75 MCG tablet TAKE ONE TABLET AT LEAST ONE HOUR BEFORE OR TWO HOURS AFTER A MEAL 90 tablet 2    lisinopril (ZESTRIL) 20 MG tablet TAKE 1 TABLET(20 MG) BY MOUTH DAILY 90 tablet 2    Multiple Vitamin (DAILY-MICHAEL MULTIVITAMIN) TABS Take 1 tablet by mouth daily 90 tablet 1    pantoprazole (PROTONIX) 40 MG EC tablet Take 1 tablet (40 mg) by mouth daily 90 tablet 1    traZODone (DESYREL) 100 MG tablet TAKE 1 TABLET BY MOUTH AT BEDTIME AS NEEDED FOR SLEEP 90 tablet 1    vitamin D3 25 mcg (1000 units) tablet Take 2 tablets (50 mcg) by mouth daily 180 tablet 1    insulin glargine (LANTUS SOLOSTAR) 100 UNIT/ML pen INJECT 65 UNITS UNDER THE SKIN EVERY MORNING AND INJECT 55 UNITS EVERY EVENING 30 mL 0     No facility-administered medications prior to visit.

## 2024-08-23 DIAGNOSIS — E10.69 TYPE 1 DIABETES MELLITUS WITH OTHER SPECIFIED COMPLICATION (H): ICD-10-CM

## 2024-08-26 DIAGNOSIS — E03.4 HYPOTHYROIDISM DUE TO ACQUIRED ATROPHY OF THYROID: ICD-10-CM

## 2024-08-26 RX ORDER — INSULIN LISPRO 100 [IU]/ML
INJECTION, SOLUTION INTRAVENOUS; SUBCUTANEOUS
Qty: 30 ML | Refills: 1 | Status: SHIPPED | OUTPATIENT
Start: 2024-08-26

## 2024-08-26 RX ORDER — LEVOTHYROXINE SODIUM 75 UG/1
TABLET ORAL
Qty: 90 TABLET | Refills: 0 | Status: SHIPPED | OUTPATIENT
Start: 2024-08-26

## 2024-08-26 NOTE — TELEPHONE ENCOUNTER
Requested Prescriptions   Pending Prescriptions Disp Refills    HUMALOG KWIKPEN 100 UNIT/ML soln [Pharmacy Med Name: HUMALOG 100 U/ML KWIK PEN INJ 3ML] 30 mL 0     Sig: INJECT UNDER THE SKIN AS DIRECTED THREE TIMES DAILY WITH MEALS(MAXIMUM  UNITS PER DAY)       Insulin Protocol Failed - 8/23/2024  6:35 PM        Failed - Has GFR on file in past 12 months and most recent value is normal        Failed - Chart Review Required     Review Chart.    Do not approve if insulin is used in a pump.  Instead, direct refill request to the patient's endocrinologist.  If the patient doesn't have an endocrinologist, then send the refill to the patient's PCP for review            Passed - Medication is active on med list        Passed - Recent (6 mo) or future (90 days) visit within the authorizing provider's specialty     The patient must have completed an in-person or virtual visit within the past 6 months or has a future visit scheduled within the next 90 days with the authorizing provider s specialty.  Urgent care and e-visits do not quality as an office visit for this protocol.          Passed - Medication indicated for associated diagnosis     Medication is associated with one or more of the following diagnoses:   - Type 1 diabetes mellitus  - Type 2 diabetes mellitus  - Diabetic nephropathy; Prophylaxis  - Neuropathy due to diabetes mellitus; Prophylaxis  - Retinopathy due to diabetes mellitus; Prophylaxis  - Diabetes mellitus associated with cystic fibrosis  - Disorder of cardiovascular system; Prophylaxis - Type 1 diabetes mellitus   - Disorder of cardiovascular system; Prophylaxis - Type 2 diabetes mellitus            Passed - Patient is 18 years of age or older

## 2024-08-26 NOTE — TELEPHONE ENCOUNTER
Requested Prescriptions   Pending Prescriptions Disp Refills    levothyroxine (SYNTHROID/LEVOTHROID) 75 MCG tablet [Pharmacy Med Name: LEVOTHYROXINE 0.075MG (75MCG) TABS] 90 tablet 2     Sig: TAKE 1 TABLET BY MOUTH DAILY 1 HOUR BEFORE OR 2 HOURS AFTER A MEAL       Thyroid Protocol Passed - 8/26/2024 12:07 AM        Passed - Patient is 12 years or older        Passed - Recent (12 mo) or future (30 days) visit within the authorizing provider's specialty     The patient must have completed an in-person or virtual visit within the past 12 months or has a future visit scheduled within the next 90 days with the authorizing provider s specialty.  Urgent care and e-visits do not quality as an office visit for this protocol.          Passed - Medication is active on med list        Passed - Medication indicated for associated diagnosis     Medication is associated with one or more of the following diagnoses:  Hypothyroidism  Thyroid stimulating hormone suppression therapy  Thyroid cancer  Acquired atrophy of thyroid          Passed - Normal TSH on file in past 12 months     Recent Labs   Lab Test 01/08/24  0750   TSH 2.06              Passed - No active pregnancy on record     If patient is pregnant or has had a positive pregnancy test, please check TSH.          Passed - No positive pregnancy test in past 12 months     If patient is pregnant or has had a positive pregnancy test, please check TSH.

## 2024-09-06 ENCOUNTER — PATIENT OUTREACH (OUTPATIENT)
Dept: CARE COORDINATION | Facility: CLINIC | Age: 69
End: 2024-09-06
Payer: MEDICARE

## 2024-09-12 DIAGNOSIS — R80.9 TYPE 2 DIABETES MELLITUS WITH MICROALBUMINURIA, WITH LONG-TERM CURRENT USE OF INSULIN (H): ICD-10-CM

## 2024-09-12 DIAGNOSIS — Z79.4 TYPE 2 DIABETES MELLITUS WITH MICROALBUMINURIA, WITH LONG-TERM CURRENT USE OF INSULIN (H): ICD-10-CM

## 2024-09-12 DIAGNOSIS — E11.29 TYPE 2 DIABETES MELLITUS WITH MICROALBUMINURIA, WITH LONG-TERM CURRENT USE OF INSULIN (H): ICD-10-CM

## 2024-09-12 RX ORDER — FLASH GLUCOSE SENSOR
KIT MISCELLANEOUS
Qty: 6 EACH | Refills: 1 | Status: SHIPPED | OUTPATIENT
Start: 2024-09-12

## 2024-10-15 DIAGNOSIS — K21.9 GASTROESOPHAGEAL REFLUX DISEASE, UNSPECIFIED WHETHER ESOPHAGITIS PRESENT: ICD-10-CM

## 2024-10-15 RX ORDER — PANTOPRAZOLE SODIUM 40 MG/1
40 TABLET, DELAYED RELEASE ORAL DAILY
Qty: 90 TABLET | Refills: 1 | Status: SHIPPED | OUTPATIENT
Start: 2024-10-15

## 2024-10-20 DIAGNOSIS — E10.69 TYPE 1 DIABETES MELLITUS WITH OTHER SPECIFIED COMPLICATION (H): ICD-10-CM

## 2024-10-21 RX ORDER — INSULIN LISPRO 100 [IU]/ML
INJECTION, SOLUTION INTRAVENOUS; SUBCUTANEOUS
Qty: 75 ML | Refills: 0 | Status: SHIPPED | OUTPATIENT
Start: 2024-10-21

## 2024-10-21 NOTE — TELEPHONE ENCOUNTER
Requested Prescriptions   Pending Prescriptions Disp Refills    HUMALOG KWIKPEN 100 UNIT/ML soln [Pharmacy Med Name: HUMALOG 100 U/ML KWIK PEN INJ 3ML] 30 mL 1     Sig: INJECT UNDER THE SKIN AS DIRECTED THREE TIMES DAILY WITH MEALS(MAXIMUM  UNITS PER DAY)       Insulin Protocol Failed - 10/20/2024 10:30 AM        Failed - Has GFR on file in past 12 months and most recent value is normal        Failed - Chart Review Required     Review Chart.    Do not approve if insulin is used in a pump.  Instead, direct refill request to the patient's endocrinologist.  If the patient doesn't have an endocrinologist, then send the refill to the patient's PCP for review            Passed - Medication is active on med list        Passed - Recent (6 mo) or future (90 days) visit within the authorizing provider's specialty     The patient must have completed an in-person or virtual visit within the past 6 months or has a future visit scheduled within the next 90 days with the authorizing provider s specialty.  Urgent care and e-visits do not quality as an office visit for this protocol.          Passed - Medication indicated for associated diagnosis     Medication is associated with one or more of the following diagnoses:   - Type 1 diabetes mellitus  - Type 2 diabetes mellitus  - Diabetic nephropathy; Prophylaxis  - Neuropathy due to diabetes mellitus; Prophylaxis  - Retinopathy due to diabetes mellitus; Prophylaxis  - Diabetes mellitus associated with cystic fibrosis  - Disorder of cardiovascular system; Prophylaxis - Type 1 diabetes mellitus   - Disorder of cardiovascular system; Prophylaxis - Type 2 diabetes mellitus            Passed - Patient is 18 years of age or older

## 2024-10-30 DIAGNOSIS — G47.33 OSA (OBSTRUCTIVE SLEEP APNEA): ICD-10-CM

## 2024-10-30 RX ORDER — TRAZODONE HYDROCHLORIDE 100 MG/1
TABLET ORAL
Qty: 90 TABLET | Refills: 1 | Status: SHIPPED | OUTPATIENT
Start: 2024-10-30

## 2024-12-17 ENCOUNTER — HOSPITAL ENCOUNTER (OUTPATIENT)
Dept: MRI IMAGING | Facility: HOSPITAL | Age: 69
Discharge: HOME | End: 2024-12-17
Attending: RADIOLOGY
Payer: MEDICARE

## 2024-12-17 PROCEDURE — 255N000002 HC RX 255 OP 636: Performed by: RADIOLOGY

## 2024-12-17 PROCEDURE — A9585 GADOBUTROL INJECTION: HCPCS | Performed by: RADIOLOGY

## 2024-12-17 PROCEDURE — 70553 MRI BRAIN STEM W/O & W/DYE: CPT | Mod: MG

## 2024-12-17 RX ORDER — GADOBUTROL 604.72 MG/ML
18 INJECTION INTRAVENOUS ONCE
Status: COMPLETED | OUTPATIENT
Start: 2024-12-17 | End: 2024-12-17

## 2024-12-17 RX ADMIN — GADOBUTROL 18 ML: 604.72 INJECTION INTRAVENOUS at 13:30

## 2024-12-24 ENCOUNTER — OFFICE VISIT (OUTPATIENT)
Dept: RADIATION ONCOLOGY | Facility: HOSPITAL | Age: 69
End: 2024-12-24
Attending: RADIOLOGY
Payer: MEDICARE

## 2024-12-24 VITALS
OXYGEN SATURATION: 95 % | SYSTOLIC BLOOD PRESSURE: 143 MMHG | RESPIRATION RATE: 20 BRPM | HEART RATE: 90 BPM | DIASTOLIC BLOOD PRESSURE: 62 MMHG

## 2024-12-24 DIAGNOSIS — D32.9 MENINGIOMA (H): Primary | ICD-10-CM

## 2024-12-24 DIAGNOSIS — E10.69 TYPE 1 DIABETES MELLITUS WITH OTHER SPECIFIED COMPLICATION (H): ICD-10-CM

## 2024-12-24 PROCEDURE — G0463 HOSPITAL OUTPT CLINIC VISIT: HCPCS | Performed by: RADIOLOGY

## 2024-12-24 RX ORDER — INSULIN LISPRO 100 [IU]/ML
INJECTION, SOLUTION INTRAVENOUS; SUBCUTANEOUS
Qty: 30 ML | Refills: 0 | Status: SHIPPED | OUTPATIENT
Start: 2024-12-24

## 2024-12-24 ASSESSMENT — PAIN SCALES - GENERAL: PAINLEVEL_OUTOF10: NO PAIN (0)

## 2024-12-24 NOTE — LETTER
"12/24/2024      Corinna Farias  [address]  [place] [address]      Dear Colleague,    Thank you for referring your patient, Corinna Farias, to the [facility] RADIATION ONCOLOGY [place]. Please see a copy of my visit note below.    Oncology Rooming Note    December 24, 2024 9:56 AM   Corinna Farias is a 69 year old female who presents for:    Chief Complaint   Patient presents with     Oncology Clinic Visit     Rad Onc follow up     Initial Vitals: BP (!) 143/62   Pulse 90   Resp 20   LMP  (LMP Unknown)   SpO2 95%  Estimated body mass index is 66.06 kg/m  as calculated from the following:    Height as of 7/9/24: 1.651 m (5' 5\").    Weight as of 7/16/24: 180.1 kg (397 lb). There is no height or weight on file to calculate BSA.  No Pain (0) Comment: Data Unavailable   No LMP recorded (lmp unknown). Patient is postmenopausal.  Allergies reviewed: Yes  Medications reviewed: Yes    Medications: Medication refills not needed today.  Pharmacy name entered into [employer]: [facility] #[id number] [address] AT [facility]    Frailty Screening:   Is the patient here for a new oncology consult visit in cancer care? 2. No      Clinical concerns: No concerns.   Dr. Rust was notified.      Taylor Dunn RN                [facility] Radiation Oncology Follow Up     Patient: Corinna Farias  MRN: [id number]  Date of Service: 12/24/2024       DISEASE TREATED:  Clinical diagnosis of meningioma involving the planum sphenoidale managing 2.8 cm with MRI evidence of increased significantly in size from CT of 2015.        TYPE OF RADIATION THERAPY ADMINISTERED:  3000 cGy in 5 treatments given from 2/7/2024 - 2/16/2024.      INTERVAL SINCE COMPLETION OF RADIATION THERAPY: 10 months.      SUBJECTIVE:  Ms. Farias is a 69 year old female who  is diabetic and takes insulin and is morbidly obese (390 lbs) with a BMI of 64.  Patient was found to have meningioma " by recent MRI scan for stroke evaluation.  The MRI brain 12/4/2023  showed meningioma along the planum sphenoidale. This measures approximately 2.8 cm anteriorly posteriorly by 2.2 cm transversely by 2.0 cm craniocaudally on the current study. There is no surrounding edema. On the MRI from 08/27/2023 without contrast the meningioma measured approximately 2.3 cm anteriorly posteriorly by 2.2 cm transversely by 1.6 cm craniocaudally. On the CT from 08/30/2015 it is very hard to discretely visualize the meningioma. Retrospectively there may be a tiny meningioma approximately 5 mm x 7 mm as seen on axial image #17. No on the current study.  There is also a small tiny meningioma along the inferior right frontal lobe.  Patient is clinically asymptomatic.  Her case has been discussed at neuro tumor conference and the consensus recommendation is to consider stereotactic radiosurgery. The patient elected proceed with definitive radiation therapy for her meningioma and had radiation therapy in our clinic with a total dose of 3000 cGy in 5 treatments given from 2/7/2024 - 2/16/2024. She tolerated radiation therapy well with minimal side effect.      The patient has been recovering well since radiation therapy and denies any pain and discomfort related to the treatment at time of evaluation.  She is here for routine post therapy office follow-up.     Medications were reviewed and are up to date on EPIC.    The following portions of the patient's history were reviewed and updated as appropriate: allergies, current medications, past family history, past medical history, past social history, past surgical history and problem list.    Review of Systems:      General  Constitutional  Constitutional (WDL): All constitutional elements are within defined limits  EENT  Eye Disorders  Eye Disorder (WDL): All eye disorder elements are within defined limits  Ear Disorders  Ear Disorder (WDL): All ear disorder elements are within defined  limits  Respiratory  Respiratory  Respiratory (WDL): Exceptions to WDL  Dyspnea: Shortness of breath with moderate exertion  Cardiovascular  Cardiovascular  Cardiovascular (WDL): All cardiovascular elements are within defined limits  Gastrointestinal  Gastrointestinal  Gastrointestinal (WDL): All gastrointestinal elements are within defined limits  Musculoskeletal  Musculoskeletal and Connective Tissue Disorders  Musculoskeletal & Connective (WDL): Exceptions to WDL  Arthralgia: Mild pain  Generalized Muscle Weakness: Symptomatic OR perceived by patient but not evident on physical exam  Integumentary  Integumentary  Integumentary (WDL): All integumentary elements are within defined limits  Neurological  Neurosensory  Neurosensory (WDL): Exceptions to WDL  Ataxia: Asymptomatic OR clinical or diagnostic observations only OR intervention not indicated (Uses a power w/c)  Peripheral Sensory Neuropathy: Moderate symptoms OR limiting instrumental ADL  Genitourinary/Reproductive  Genitourinary  Genitourinary (WDL): Assessment not pertinent to visit  Lymphatic  Lymph System Disorders  Lymph (WDL): Assessment not pertinent to visit  Pain  Pain Score: No Pain (0)  AUA Assessment                                                              Accompanied by  Accompanied By: spouse (Ganesh)    Objective:      PHYSICAL EXAMINATION:    BP (!) 143/62   Pulse 90   Resp 20   LMP  (LMP Unknown)   SpO2 95%     Gen: Alert, in NAD  Eyes: PERRL, EOMI, sclera anicteric  Pulm: No wheezing, stridor or respiratory distress  CV: Well-perfused, no cyanosis, no pedal edema  Back: No step-offs or pain to palpation along the thoracolumbar spine  Rectal: Deferred  : Deferred  Musculoskeletal: Normal muscle bulk and tone  Skin: Normal color and turgor  Neurologic: A/Ox3, CN II-XII intact, normal gait and station  Psychiatric: Appropriate mood and affect     Imaging: Imaging results 30 days: MRI Brain w & w/o contrast    Result Date:  12/18/2024  EXAM: MR BRAIN WITHOUT AND WITH CONTRAST LOCATION: [facility] DATE: 12/17/2024 INDICATION: Meningioma (H). COMPARISON: MRI 05/13/2024. MRI 12/04/2023. CONTRAST: 18 mL Gadavist. TECHNIQUE: Routine multiplanar multisequence head MRI without and with intravenous contrast. FINDINGS: INTRACRANIAL CONTENTS: The avidly enhancing extra-axial mass lesion along the planum sphenoidale measures approximately 2.8 cm AP x 2.6 cm TV x 2.0 cm SI. This represents an increase in size since 12/04/2023, when it measured approximately 2.8 x 2.2 x 1.8 cm. Moreover, there is slightly increased nodularity along its margins. For example, compare image 157 of series 19 on today's study to image 70 of series 5007 on the 05/13/2024 study. Surrounding vasogenic edema has moderately increased, particularly in the right frontal lobe. The midline is preserved and the basal cisterns appear patent. The 4 mm nodular focus of enhancement along the right inferior frontal lobe, greater sphenoid wing (image 143 of series 19) remains stable without surrounding edema. No new lesions are demonstrated. There is no pattern of diffusion restriction to suggest acute infarction. No evidence for interval intracranial hemorrhage. Scattered nonspecific T2/FLAIR hyperintensities within the cerebral white matter most consistent with mild chronic microvascular ischemic change. The ventricles, sulci and cisterns are appropriate in size and configuration. Normal position of the cerebellar tonsils. The major intracranial flow voids are unremarkable. SELLA: No abnormality accounting for technique. OSSEOUS STRUCTURES/SOFT TISSUES: Unremarkable marrow signal. Unremarkable extracranial soft tissues. Limited images of the upper cervical spine demonstrate no acute abnormality.  ORBITS: Prior bilateral cataract surgery. Visualized portions of the orbits are otherwise unremarkable. SINUSES/MASTOIDS: Mild mucosal thickening scattered about  the paranasal sinuses. Scattered fluid/membrane thickening in the right mastoid air cells. No apparent mass in the posterior nasopharynx or skull base.     IMPRESSION: 1.  Slightly increased size of presumed planum sphenoidale meningioma. Moderate increase in surrounding vasogenic edema, particularly in the inferomedial right frontal lobe. 2.  Stable presumed subcentimeter meningioma along the right inferior frontal lobe. 3.  No evidence for progressive mass effect. 4.  No evidence of superimposed acute intracranial abnormality. 5.  Mild presumed chronic small vessel ischemic change. 6.  Right mastoid effusion.           Impression     69 year old female with a clinical diagnosis of meningioma involving the planum sphenoidale managing 2.8 cm with MRI evidence of increased significantly in size from CT of 2015.  The patient elected proceed with definitive radiation therapy for her meningioma and had radiation therapy in our clinic with a total dose of 3000 cGy in 5 treatments given from 2/7/2024 - 2/16/2024.     Assessment & Plan:     1.  I have personally reviewed her restaging MRI scan and compared to the previous MRI scan and the radiation therapy field.  There is no evidence of recurrence.  The patient is scheduled return to radiation oncology in 6 months for office follow-up and MRI scan.     2.  Continue follow-up with Dr. Lionel Guerin, neurosurgery and Dr. Masood Melton, PCP as planned.     Pain Management Plan: NA    Face to face time 30 minutes with > 80% spent on consultation, education and coordination of care.      Kary Rust MD  Department of Radiation Oncology   [facility] Radiation Oncology  Tel: [phone]  Page: [phone]    [facility]  [address] [place] [address]     [address]   [place] [address]    CC:  Patient Care Team:  Masood Melton MD as PCP - General  Elieser Melton MD as MD (Surgery)  Masood Melton MD as Assigned PCP  [place]  SENTHIL Blair as Nurse Practitioner  Lionel Guerin MD as Assigned Neuroscience Provider  Kary Rust MD as MD (Radiation Oncology)  Kary Rust MD as Assigned Cancer Care Provider      Again, thank you for allowing me to participate in the care of your patient.        Sincerely,        Kary Rust MD    Electronically signed

## 2024-12-24 NOTE — TELEPHONE ENCOUNTER
Requested Prescriptions   Pending Prescriptions Disp Refills    HUMALOG KWIKPEN 100 UNIT/ML soln 75 mL 0     Sig: INJECT UNDER THE SKIN AS DIRECTED THREE TIMES DAILY WITH MEALS(MAXIMUM  UNITS PER DAY)       Insulin Protocol Failed - 12/24/2024  8:15 AM        Failed - Chart review required     Review Chart.    Do not approve if insulin is used in a pump.  Instead, direct refill request to the patient's endocrinologist.  If the patient doesn't have an endocrinologist, then send the refill to the patient's PCP for review            Passed - HgbA1C in past 3 or 6 months     If HgbA1C is 8 or greater, it needs to be on file within the past 3 months.  If less than 8, must be on file within the past 6 months.     Recent Labs   Lab Test 07/08/24  1029   A1C 6.9*             Passed - Medication is active on med list        Passed - Recent (6 mo) or future (90 days) visit within the authorizing provider's specialty     The patient must have completed an in-person or virtual visit within the past 6 months or has a future visit scheduled within the next 90 days with the authorizing provider s specialty.  Urgent care and e-visits do not quality as an office visit for this protocol.          Passed - Medication indicated for associated diagnosis     Medication is associated with one or more of the following diagnoses:   - Type 1 diabetes mellitus  - Type 2 diabetes mellitus  - Diabetic nephropathy; Prophylaxis  - Neuropathy due to diabetes mellitus; Prophylaxis  - Retinopathy due to diabetes mellitus; Prophylaxis  - Diabetes mellitus associated with cystic fibrosis  - Disorder of cardiovascular system; Prophylaxis - Type 1 diabetes mellitus   - Disorder of cardiovascular system; Prophylaxis - Type 2 diabetes mellitus            Passed - Patient is 18 years of age or older

## 2024-12-24 NOTE — PROGRESS NOTES
"Oncology Rooming Note    December 24, 2024 9:56 AM   Corinna Farias is a 69 year old female who presents for:    Chief Complaint   Patient presents with    Oncology Clinic Visit     Rad Onc follow up     Initial Vitals: BP (!) 143/62   Pulse 90   Resp 20   LMP  (LMP Unknown)   SpO2 95%  Estimated body mass index is 66.06 kg/m  as calculated from the following:    Height as of 7/9/24: 1.651 m (5' 5\").    Weight as of 7/16/24: 180.1 kg (397 lb). There is no height or weight on file to calculate BSA.  No Pain (0) Comment: Data Unavailable   No LMP recorded (lmp unknown). Patient is postmenopausal.  Allergies reviewed: Yes  Medications reviewed: Yes    Medications: Medication refills not needed today.  Pharmacy name entered into [employer]: [facility] #[id number] [address]    Frailty Screening:   Is the patient here for a new oncology consult visit in cancer care? 2. No      Clinical concerns: No concerns.   Dr. Rust was notified.      Taylor Dunn RN              "

## 2024-12-24 NOTE — PROGRESS NOTES
[facility] Radiation Oncology Follow Up     Patient: Corinna Farias  MRN: [id number]  Date of Service: 12/24/2024       DISEASE TREATED:  Clinical diagnosis of meningioma involving the planum sphenoidale managing 2.8 cm with MRI evidence of increased significantly in size from CT of 2015.        TYPE OF RADIATION THERAPY ADMINISTERED:  3000 cGy in 5 treatments given from 2/7/2024 - 2/16/2024.      INTERVAL SINCE COMPLETION OF RADIATION THERAPY: 10 months.      SUBJECTIVE:  Ms. Farias is a 69 year old female who  is diabetic and takes insulin and is morbidly obese (390 lbs) with a BMI of 64.  Patient was found to have meningioma by recent MRI scan for stroke evaluation.  The MRI brain 12/4/2023  showed meningioma along the planum sphenoidale. This measures approximately 2.8 cm anteriorly posteriorly by 2.2 cm transversely by 2.0 cm craniocaudally on the current study. There is no surrounding edema. On the MRI from 08/27/2023 without contrast the meningioma measured approximately 2.3 cm anteriorly posteriorly by 2.2 cm transversely by 1.6 cm craniocaudally. On the CT from 08/30/2015 it is very hard to discretely visualize the meningioma. Retrospectively there may be a tiny meningioma approximately 5 mm x 7 mm as seen on axial image #17. No on the current study.  There is also a small tiny meningioma along the inferior right frontal lobe.  Patient is clinically asymptomatic.  Her case has been discussed at neuro tumor conference and the consensus recommendation is to consider stereotactic radiosurgery. The patient elected proceed with definitive radiation therapy for her meningioma and had radiation therapy in our clinic with a total dose of 3000 cGy in 5 treatments given from 2/7/2024 - 2/16/2024. She tolerated radiation therapy well with minimal side effect.      The patient has been recovering well since radiation therapy and denies any pain and discomfort related to the treatment at time of evaluation.  She  is here for routine post therapy office follow-up.     Medications were reviewed and are up to date on EPIC.    The following portions of the patient's history were reviewed and updated as appropriate: allergies, current medications, past family history, past medical history, past social history, past surgical history and problem list.    Review of Systems:      General  Constitutional  Constitutional (WDL): All constitutional elements are within defined limits  EENT  Eye Disorders  Eye Disorder (WDL): All eye disorder elements are within defined limits  Ear Disorders  Ear Disorder (WDL): All ear disorder elements are within defined limits  Respiratory  Respiratory  Respiratory (WDL): Exceptions to WDL  Dyspnea: Shortness of breath with moderate exertion  Cardiovascular  Cardiovascular  Cardiovascular (WDL): All cardiovascular elements are within defined limits  Gastrointestinal  Gastrointestinal  Gastrointestinal (WDL): All gastrointestinal elements are within defined limits  Musculoskeletal  Musculoskeletal and Connective Tissue Disorders  Musculoskeletal & Connective (WDL): Exceptions to WDL  Arthralgia: Mild pain  Generalized Muscle Weakness: Symptomatic OR perceived by patient but not evident on physical exam  Integumentary  Integumentary  Integumentary (WDL): All integumentary elements are within defined limits  Neurological  Neurosensory  Neurosensory (WDL): Exceptions to WDL  Ataxia: Asymptomatic OR clinical or diagnostic observations only OR intervention not indicated (Uses a power w/c)  Peripheral Sensory Neuropathy: Moderate symptoms OR limiting instrumental ADL  Genitourinary/Reproductive  Genitourinary  Genitourinary (WDL): Assessment not pertinent to visit  Lymphatic  Lymph System Disorders  Lymph (WDL): Assessment not pertinent to visit  Pain  Pain Score: No Pain (0)  AUA Assessment                                                              Accompanied by  Accompanied By: spouse  (Ganesh)    Objective:      PHYSICAL EXAMINATION:    BP (!) 143/62   Pulse 90   Resp 20   LMP  (LMP Unknown)   SpO2 95%     Gen: Alert, in NAD  Eyes: PERRL, EOMI, sclera anicteric  Pulm: No wheezing, stridor or respiratory distress  CV: Well-perfused, no cyanosis, no pedal edema  Back: No step-offs or pain to palpation along the thoracolumbar spine  Rectal: Deferred  : Deferred  Musculoskeletal: Normal muscle bulk and tone  Skin: Normal color and turgor  Neurologic: A/Ox3, CN II-XII intact, normal gait and station  Psychiatric: Appropriate mood and affect     Imaging: Imaging results 30 days: MRI Brain w & w/o contrast    Result Date: 12/18/2024  EXAM: MR BRAIN WITHOUT AND WITH CONTRAST LOCATION: [facility] DATE: 12/17/2024 INDICATION: Meningioma (H). COMPARISON: MRI 05/13/2024. MRI 12/04/2023. CONTRAST: 18 mL Gadavist. TECHNIQUE: Routine multiplanar multisequence head MRI without and with intravenous contrast. FINDINGS: INTRACRANIAL CONTENTS: The avidly enhancing extra-axial mass lesion along the planum sphenoidale measures approximately 2.8 cm AP x 2.6 cm TV x 2.0 cm SI. This represents an increase in size since 12/04/2023, when it measured approximately 2.8 x 2.2 x 1.8 cm. Moreover, there is slightly increased nodularity along its margins. For example, compare image 157 of series 19 on today's study to image 70 of series 5007 on the 05/13/2024 study. Surrounding vasogenic edema has moderately increased, particularly in the right frontal lobe. The midline is preserved and the basal cisterns appear patent. The 4 mm nodular focus of enhancement along the right inferior frontal lobe, greater sphenoid wing (image 143 of series 19) remains stable without surrounding edema. No new lesions are demonstrated. There is no pattern of diffusion restriction to suggest acute infarction. No evidence for interval intracranial hemorrhage. Scattered nonspecific T2/FLAIR hyperintensities within the  cerebral white matter most consistent with mild chronic microvascular ischemic change. The ventricles, sulci and cisterns are appropriate in size and configuration. Normal position of the cerebellar tonsils. The major intracranial flow voids are unremarkable. SELLA: No abnormality accounting for technique. OSSEOUS STRUCTURES/SOFT TISSUES: Unremarkable marrow signal. Unremarkable extracranial soft tissues. Limited images of the upper cervical spine demonstrate no acute abnormality.  ORBITS: Prior bilateral cataract surgery. Visualized portions of the orbits are otherwise unremarkable. SINUSES/MASTOIDS: Mild mucosal thickening scattered about the paranasal sinuses. Scattered fluid/membrane thickening in the right mastoid air cells. No apparent mass in the posterior nasopharynx or skull base.     IMPRESSION: 1.  Slightly increased size of presumed planum sphenoidale meningioma. Moderate increase in surrounding vasogenic edema, particularly in the inferomedial right frontal lobe. 2.  Stable presumed subcentimeter meningioma along the right inferior frontal lobe. 3.  No evidence for progressive mass effect. 4.  No evidence of superimposed acute intracranial abnormality. 5.  Mild presumed chronic small vessel ischemic change. 6.  Right mastoid effusion.           Impression     69 year old female with a clinical diagnosis of meningioma involving the planum sphenoidale managing 2.8 cm with MRI evidence of increased significantly in size from CT of 2015.  The patient elected proceed with definitive radiation therapy for her meningioma and had radiation therapy in our clinic with a total dose of 3000 cGy in 5 treatments given from 2/7/2024 - 2/16/2024.     Assessment & Plan:     1.  I have personally reviewed her restaging MRI scan and compared to the previous MRI scan and the radiation therapy field.  There is no evidence of recurrence.  The patient is scheduled return to radiation oncology in 6 months for office follow-up  and MRI scan.     2.  Continue follow-up with Dr. Lionel Guerin, neurosurgery and Dr. Masood Melton, PCP as planned.     Pain Management Plan: NA    Face to face time 30 minutes with > 80% spent on consultation, education and coordination of care.      Kary Rust MD  Department of Radiation Oncology   [facility] Radiation Oncology  Tel: [phone]  Page: [phone]    [facility]  [address] [place] [address]     [address]   [place] [address]    CC:  Patient Care Team:  Masood Melton MD as PCP - General  Elieser Melton MD as MD (Surgery)  Masood eMlton MD as Assigned PCP  Rossy Baig NP as Nurse Practitioner  Lionel Guerin MD as Assigned Neuroscience Provider  Kary Rust MD as MD (Radiation Oncology)  Kary Rust MD as Assigned Cancer Care Provider

## 2025-01-06 ENCOUNTER — LAB (OUTPATIENT)
Dept: LAB | Facility: CLINIC | Age: 70
End: 2025-01-06
Attending: NURSE PRACTITIONER
Payer: MEDICARE

## 2025-01-06 DIAGNOSIS — N18.30 CHRONIC KIDNEY DISEASE, STAGE 3 (H): ICD-10-CM

## 2025-01-06 DIAGNOSIS — E10.69 TYPE 1 DIABETES MELLITUS WITH OTHER SPECIFIED COMPLICATION (H): ICD-10-CM

## 2025-01-06 DIAGNOSIS — E10.9 TYPE 1 DIABETES MELLITUS (H): Primary | ICD-10-CM

## 2025-01-06 DIAGNOSIS — E55.9 VITAMIN D DEFICIENCY: ICD-10-CM

## 2025-01-06 LAB
ANION GAP SERPL CALCULATED.3IONS-SCNC: 12 MMOL/L (ref 7–15)
BUN SERPL-MCNC: 17.5 MG/DL (ref 8–23)
CALCIUM SERPL-MCNC: 9.7 MG/DL (ref 8.8–10.4)
CHLORIDE SERPL-SCNC: 100 MMOL/L (ref 98–107)
CHOLEST SERPL-MCNC: 136 MG/DL
CREAT SERPL-MCNC: 1.07 MG/DL (ref 0.51–0.95)
EGFRCR SERPLBLD CKD-EPI 2021: 57 ML/MIN/1.73M2
EST. AVERAGE GLUCOSE BLD GHB EST-MCNC: 143 MG/DL
FASTING STATUS PATIENT QL REPORTED: NO
FASTING STATUS PATIENT QL REPORTED: NO
GLUCOSE SERPL-MCNC: 204 MG/DL (ref 70–99)
HBA1C MFR BLD: 6.6 % (ref 0–5.6)
HCO3 SERPL-SCNC: 25 MMOL/L (ref 22–29)
HDLC SERPL-MCNC: 47 MG/DL
HGB BLD-MCNC: 14.4 G/DL (ref 11.7–15.7)
LDLC SERPL CALC-MCNC: 68 MG/DL
NONHDLC SERPL-MCNC: 89 MG/DL
POTASSIUM SERPL-SCNC: 4.4 MMOL/L (ref 3.4–5.3)
SODIUM SERPL-SCNC: 137 MMOL/L (ref 135–145)
T4 FREE SERPL-MCNC: 1.41 NG/DL (ref 0.9–1.7)
TRIGL SERPL-MCNC: 105 MG/DL
TSH SERPL DL<=0.005 MIU/L-ACNC: 2.81 UIU/ML (ref 0.3–4.2)

## 2025-01-06 PROCEDURE — 80061 LIPID PANEL: CPT

## 2025-01-06 PROCEDURE — 83036 HEMOGLOBIN GLYCOSYLATED A1C: CPT

## 2025-01-06 PROCEDURE — 85018 HEMOGLOBIN: CPT

## 2025-01-06 PROCEDURE — 36415 COLL VENOUS BLD VENIPUNCTURE: CPT

## 2025-01-06 PROCEDURE — 80048 BASIC METABOLIC PNL TOTAL CA: CPT

## 2025-01-06 PROCEDURE — 84439 ASSAY OF FREE THYROXINE: CPT

## 2025-01-06 PROCEDURE — 84443 ASSAY THYROID STIM HORMONE: CPT

## 2025-01-07 RX ORDER — VITAMIN B COMPLEX
2 TABLET ORAL DAILY
Qty: 180 TABLET | Refills: 0 | Status: SHIPPED | OUTPATIENT
Start: 2025-01-07

## 2025-01-08 SDOH — HEALTH STABILITY: PHYSICAL HEALTH: ON AVERAGE, HOW MANY DAYS PER WEEK DO YOU ENGAGE IN MODERATE TO STRENUOUS EXERCISE (LIKE A BRISK WALK)?: 0 DAYS

## 2025-01-08 SDOH — HEALTH STABILITY: PHYSICAL HEALTH: ON AVERAGE, HOW MANY MINUTES DO YOU ENGAGE IN EXERCISE AT THIS LEVEL?: 0 MIN

## 2025-01-08 ASSESSMENT — SOCIAL DETERMINANTS OF HEALTH (SDOH): HOW OFTEN DO YOU GET TOGETHER WITH FRIENDS OR RELATIVES?: NEVER

## 2025-01-13 ENCOUNTER — OFFICE VISIT (OUTPATIENT)
Dept: INTERNAL MEDICINE | Facility: CLINIC | Age: 70
End: 2025-01-13
Payer: MEDICARE

## 2025-01-13 VITALS
RESPIRATION RATE: 20 BRPM | TEMPERATURE: 98 F | DIASTOLIC BLOOD PRESSURE: 85 MMHG | BODY MASS INDEX: 48.82 KG/M2 | OXYGEN SATURATION: 94 % | WEIGHT: 293 LBS | HEART RATE: 104 BPM | HEIGHT: 65 IN | SYSTOLIC BLOOD PRESSURE: 137 MMHG

## 2025-01-13 DIAGNOSIS — E10.22 TYPE 1 DIABETES MELLITUS WITH STAGE 3A CHRONIC KIDNEY DISEASE (H): ICD-10-CM

## 2025-01-13 DIAGNOSIS — E11.21 MICROALBUMINURIC DIABETIC NEPHROPATHY (H): ICD-10-CM

## 2025-01-13 DIAGNOSIS — M79.672 LEFT FOOT PAIN: ICD-10-CM

## 2025-01-13 DIAGNOSIS — Z00.00 ENCOUNTER FOR MEDICARE ANNUAL WELLNESS EXAM: Primary | ICD-10-CM

## 2025-01-13 DIAGNOSIS — G47.33 OSA (OBSTRUCTIVE SLEEP APNEA): ICD-10-CM

## 2025-01-13 DIAGNOSIS — Z12.31 VISIT FOR SCREENING MAMMOGRAM: ICD-10-CM

## 2025-01-13 DIAGNOSIS — D32.9 MENINGIOMA (H): ICD-10-CM

## 2025-01-13 DIAGNOSIS — E03.9 HYPOTHYROIDISM, UNSPECIFIED TYPE: ICD-10-CM

## 2025-01-13 DIAGNOSIS — I10 ESSENTIAL HYPERTENSION, BENIGN: ICD-10-CM

## 2025-01-13 DIAGNOSIS — N18.31 TYPE 1 DIABETES MELLITUS WITH STAGE 3A CHRONIC KIDNEY DISEASE (H): ICD-10-CM

## 2025-01-13 DIAGNOSIS — E66.01 MORBID OBESITY (H): ICD-10-CM

## 2025-01-13 LAB
ALBUMIN UR-MCNC: NEGATIVE MG/DL
AMORPH CRY #/AREA URNS HPF: ABNORMAL /HPF
APPEARANCE UR: ABNORMAL
BACTERIA #/AREA URNS HPF: ABNORMAL /HPF
BILIRUB UR QL STRIP: NEGATIVE
COLOR UR AUTO: YELLOW
CREAT UR-MCNC: 143 MG/DL
GLUCOSE UR STRIP-MCNC: NEGATIVE MG/DL
HGB UR QL STRIP: ABNORMAL
KETONES UR STRIP-MCNC: NEGATIVE MG/DL
LEUKOCYTE ESTERASE UR QL STRIP: ABNORMAL
MICROALBUMIN UR-MCNC: <12 MG/L
MICROALBUMIN/CREAT UR: NORMAL MG/G{CREAT}
NITRATE UR QL: NEGATIVE
PH UR STRIP: 5.5 [PH] (ref 5–8)
RBC #/AREA URNS AUTO: ABNORMAL /HPF
SP GR UR STRIP: 1.02 (ref 1–1.03)
SQUAMOUS #/AREA URNS AUTO: ABNORMAL /LPF
UROBILINOGEN UR STRIP-ACNC: 0.2 E.U./DL
WBC #/AREA URNS AUTO: ABNORMAL /HPF
WBC CLUMPS #/AREA URNS HPF: PRESENT /HPF

## 2025-01-13 PROCEDURE — 99213 OFFICE O/P EST LOW 20 MIN: CPT | Mod: 25 | Performed by: INTERNAL MEDICINE

## 2025-01-13 PROCEDURE — 82043 UR ALBUMIN QUANTITATIVE: CPT

## 2025-01-13 PROCEDURE — 87086 URINE CULTURE/COLONY COUNT: CPT | Mod: GZ | Performed by: INTERNAL MEDICINE

## 2025-01-13 PROCEDURE — 82570 ASSAY OF URINE CREATININE: CPT

## 2025-01-13 PROCEDURE — G0008 ADMIN INFLUENZA VIRUS VAC: HCPCS | Performed by: INTERNAL MEDICINE

## 2025-01-13 PROCEDURE — G0439 PPPS, SUBSEQ VISIT: HCPCS | Performed by: INTERNAL MEDICINE

## 2025-01-13 PROCEDURE — 81001 URINALYSIS AUTO W/SCOPE: CPT | Performed by: INTERNAL MEDICINE

## 2025-01-13 PROCEDURE — 90662 IIV NO PRSV INCREASED AG IM: CPT | Performed by: INTERNAL MEDICINE

## 2025-01-13 ASSESSMENT — PAIN SCALES - GENERAL: PAINLEVEL_OUTOF10: NO PAIN (0)

## 2025-01-13 NOTE — PROGRESS NOTES
Preventive Care Visit  [facility]  MAGALI JEAN-BAPTISTE MD, Internal Medicine  Jan 13, 2025    1. Encounter for Medicare annual wellness exam (Primary)  Flu shot today.  I have urged mammogram and bone density.    2. Type 1 diabetes mellitus with stage 3a chronic kidney disease (H)  He is due for her eye exam.  I have urged her to get that set up.  Will have her meet with podiatrist.  See below.  - OPTOMETRY REFERRAL; Future  - UA Macroscopic with reflex to Microscopic and Culture - Lab Collect; Future  - UA Macroscopic with reflex to Microscopic and Culture - Lab Collect  - Orthopedic  Referral; Future    3. GIOVANI (obstructive sleep apnea)--will not use CPAP  She refuses to have treatment for her sleep apnea.    4. Microalbuminuric diabetic nephropathy (H)  Continue lisinopril.  Good sugar control.    5. Morbid obesity (H)  Difficult situation.  She is really unable to be active whatsoever.    6. Meningioma (H)--s/p XRT 2024  She will have another repeat MRI in June.    7. Hypothyroidism, unspecified type  Recent TSH looked good.    8. Essential hypertension, benign  Blood pressure looks good today.    9. Visit for screening mammogram    - MA Screening Bilateral w/ Kevon; Future    10. Left foot pain  I do not think there is foreign body in the foot but cannot be sure.  Will have her meet with podiatry for further evaluation.  - Orthopedic  Referral; Future     Subjective   Corinna is a 69 year old, presenting for the following:  Wellness Visit and Recheck Medication (Pt is here yo complete her annual wellness visit, and pt would like a kiko shot today as well.)        1/13/2025    10:04 AM   Additional Questions   Roomed by oriana   Accompanied by friend         1/13/2025    10:04 AM   Patient Reported Additional Medications   Patient reports taking the following new medications none           HPI      Corinna comes in today with her  Ganesh.  Corinna has morbid obesity with type 1  diabetes.  She has maintained good sugar control.  She follows with endocrinology regarding this.  She otherwise denies somatic concerns for me.  Due to her morbid obesity she is very sedentary.  Basically uses a scooter to get around.  She is able to transfer and walk a few steps however.  Denies any cardiopulmonary symptoms.  Does note that she has some pain on the lateral aspect of her left foot.    Health Care Directive  Patient does not have a Health Care Directive: Discussed advance care planning with patient; however, patient declined at this time.      1/8/2025   General Health   How would you rate your overall physical health? Good   Feel stress (tense, anxious, or unable to sleep) Not at all         1/8/2025   Nutrition   Diet: Diabetic    Carbohydrate counting       Multiple values from one day are sorted in reverse-chronological order         1/8/2025   Exercise   Days per week of moderate/strenous exercise 0 days   Average minutes spent exercising at this level 0 min   (!) EXERCISE CONCERN      1/8/2025   Social Factors   Frequency of gathering with friends or relatives Never   Worry food won't last until get money to buy more No   Food not last or not have enough money for food? No   Do you have housing? (Housing is defined as stable permanent housing and does not include staying ouside in a car, in a tent, in an abandoned building, in an overnight shelter, or couch-surfing.) Yes   Are you worried about losing your housing? No   Lack of transportation? No   Unable to get utilities (heat,electricity)? No   (!) SOCIAL CONNECTIONS CONCERN      1/8/2025   Fall Risk   Fallen 2 or more times in the past year? No    No   Trouble with walking or balance? No    No       Multiple values from one day are sorted in reverse-chronological order          1/8/2025   Activities of Daily Living- Home Safety   Needs help with the following daily activites None of the above   Safety concerns in the home None of the above          1/8/2025   Dental   Dentist two times every year? (!) NO         1/8/2025   Hearing Screening   Hearing concerns? None of the above         1/8/2025   Driving Risk Screening   Patient/family members have concerns about driving No         1/8/2025   General Alertness/Fatigue Screening   Have you been more tired than usual lately? No         1/8/2025   Urinary Incontinence Screening   Bothered by leaking urine in past 6 months No         1/8/2025   TB Screening   Were you born outside of the [place]? No         Today's PHQ-2 Score:       1/13/2025     9:47 AM   PHQ-2 ( 1999 Pf[employer])   Q1: Little interest or pleasure in doing things 0   Q2: Feeling down, depressed or hopeless 0   PHQ-2 Score 0    Q1: Little interest or pleasure in doing things Not at all   Q2: Feeling down, depressed or hopeless Not at all   PHQ-2 Score 0       Patient-reported           1/8/2025   Substance Use   Alcohol more than 3/day or more than 7/wk No   Do you have a current opioid prescription? No   How severe/bad is pain from 1 to 10? 0/10 (No Pain)   Do you use any other substances recreationally? No     Social History     Tobacco Use    Smoking status: Never    Smokeless tobacco: Never   Vaping Use    Vaping status: Never Used   Substance Use Topics    Alcohol use: No    Drug use: No          Mammogram Screening - Mammogram every 1-2 years updated in Health Maintenance based on mutual decision making      History of abnormal Pap smear:         9/27/2017    12:00 AM   PAP / HPV   HPV_EXT - HISTORICAL See Scanned Report      ASCVD Risk   The 10-year ASCVD risk score (Evaristo PIERRE, et al., 2019) is: 17.3%    Values used to calculate the score:      Age: 69 years      Sex: Female      Is Non- : No      Diabetic: Yes      Tobacco smoker: No      Systolic Blood Pressure: 137 mmHg      Is BP treated: Yes      HDL Cholesterol: 47 mg/dL      Total Cholesterol: 136 mg/dL            Reviewed and updated as needed this  "visit by Provider                      Current providers sharing in care for this patient include:  Patient Care Team:  Masood Melton MD as PCP - General  Elieser Melton MD as MD (Surgery)  Masood Melton MD as Assigned PCP  Rossy Baig NP as Nurse Practitioner  Lionel Guerin MD as Assigned Neuroscience Provider  Kary Rust MD as MD (Radiation Oncology)  Kary Rust MD as Assigned Cancer Care Provider    The following health maintenance items are reviewed in Epic and correct as of today:  Health Maintenance   Topic Date Due    DEXA  Never done    MAMMO SCREENING  03/07/2020    INFLUENZA VACCINE (1) 09/01/2024    COVID-19 Vaccine (8 - 2024-25 season) 09/03/2024    EYE EXAM  09/22/2024    MICROALBUMIN  01/08/2025    DIABETIC FOOT EXAM  01/08/2025    ANNUAL REVIEW OF HM ORDERS  01/08/2025    MEDICARE ANNUAL WELLNESS VISIT  01/08/2025    A1C  07/06/2025    BMP  01/06/2026    LIPID  01/06/2026    TSH W/FREE T4 REFLEX  01/06/2026    HEMOGLOBIN  01/06/2026    FALL RISK ASSESSMENT  01/13/2026    ADVANCE CARE PLANNING  01/08/2029    COLORECTAL CANCER SCREENING  05/04/2031    DTAP/TDAP/TD IMMUNIZATION (3 - Td or Tdap) 04/28/2032    HEPATITIS C SCREENING  Completed    PHQ-2 (once per calendar year)  Completed    Pneumococcal Vaccine: 50+ Years  Completed    URINALYSIS  Completed    ZOSTER IMMUNIZATION  Completed    RSV VACCINE  Completed    HPV IMMUNIZATION  Aged Out    MENINGITIS IMMUNIZATION  Aged Out    RSV MONOCLONAL ANTIBODY  Aged Out    PAP  Discontinued            Objective    Exam  /85 (BP Location: Left arm, Patient Position: Sitting, Cuff Size: Adult Large)   Pulse 104   Temp 98  F (36.7  C) (Tympanic)   Resp 20   Ht 1.651 m (5' 5\")   Wt (!) 178.3 kg (393 lb)   LMP  (LMP Unknown)   SpO2 94%   Breastfeeding No   BMI 65.40 kg/m     Estimated body mass index is 65.4 kg/m  as calculated from the following:    Height as of this encounter: 1.651 m (5' 5\").    Weight as of this encounter: " 178.3 kg (393 lb).    Physical Exam  GENERAL: obese  NEURO: Normal strength and tone, mentation intact and speech normal  PSYCH: mentation appears normal, affect normal/bright  Diabetic foot exam: normal DP and PT pulses, no trophic changes or ulcerative lesions, and normal monofilament exam        1/13/2025   Mini Cog   Clock Draw Score 2 Normal   3 Item Recall 3 objects recalled   Mini Cog Total Score 5              Signed Electronically by: MAGALI JEAN-BAPTISTE MD

## 2025-01-13 NOTE — PATIENT INSTRUCTIONS
"Patient Education   Eating Healthy Foods: Care Instructions  With every meal, you can make healthy food choices. Try to eat a variety of fruits, vegetables, whole grains, lean proteins, and low-fat dairy products. This can help you get the right balance of nutrients, including vitamins and minerals. Small changes add up over time. You can start by adding one healthy food to your meals each day.    Try to make half your plate fruits and vegetables, one-fourth whole grains, and one-fourth lean proteins. Try including dairy with your meals.   Eat more fruits and vegetables. Try to have them with most meals and snacks.   Foods for healthy eating        Fruits   These can be fresh, frozen, canned, or dried.  Try to choose whole fruit rather than fruit juice.  Eat a variety of colors.        Vegetables   These can be fresh, frozen, canned, or dried.  Beans, peas, and lentils count too.        Whole grains   Choose whole-grain breads, cereals, and noodles.  Try brown rice.        Lean proteins   These can include lean meat, poultry, fish, and eggs.  You can also have tofu, beans, peas, lentils, nuts, and seeds.        Dairy   Try milk, yogurt, and cheese.  Choose low-fat or fat-free when you can.  If you need to, use lactose-free milk or fortified plant-based milk products, such as soy milk.        Water   Drink water when you're thirsty.  Limit sugar-sweetened drinks, including soda, fruit drinks, and sports drinks.  Where can you learn more?  Go to https://www.[employer].net/patiented  Enter T756 in the search box to learn more about \"Eating Healthy Foods: Care Instructions.\"  Current as of: September 20, 2023  Content Version: 14.3    2024 [employer].   Care instructions adapted under license by your healthcare professional. If you have questions about a medical condition or this instruction, always ask your healthcare professional. [employer] disclaims any warranty or liability for your use of " this information.    Body Mass Index: Care Instructions  Overview     Body mass index (BMI) can help you see if your weight is raising your risk for health problems. It uses a formula to compare how much you weigh with how tall you are.  A BMI lower than 18.5 is considered underweight.  A BMI between 18.5 and 24.9 is considered healthy.  A BMI between 25 and 29.9 is considered overweight. A BMI of 30 or higher is considered obese.  If your BMI is in the normal range, it means that you have a lower risk for weight-related health problems. If your BMI is in the overweight or obese range, you may be at increased risk for weight-related health problems, such as high blood pressure, heart disease, stroke, arthritis or joint pain, and diabetes. If your BMI is in the underweight range, you may be at increased risk for health problems such as fatigue, lower protection (immunity) against illness, muscle loss, bone loss, hair loss, and hormone problems.  BMI is just one measure of your risk for weight-related health problems. You may be at higher risk for health problems if you are not active, you eat an unhealthy diet, or you drink too much alcohol or use tobacco products.  Follow-up care is a key part of your treatment and safety. Be sure to make and go to all appointments, and call your doctor if you are having problems. It's also a good idea to know your test results and keep a list of the medicines you take.  How can you care for yourself at home?  Practice healthy eating habits. This includes eating plenty of fruits, vegetables, whole grains, lean protein, and low-fat dairy.  If your doctor recommends it, get more exercise. Walking is a good choice. Bit by bit, increase the amount you walk every day. Try for at least 30 minutes on most days of the week.  Do not smoke. Smoking can increase your risk for health problems. If you need help quitting, talk to your doctor about stop-smoking programs and medicines. These can  "increase your chances of quitting for good.  Limit alcohol to 2 drinks a day for men and 1 drink a day for women. Too much alcohol can cause health problems.  If you have a BMI higher than 25  Your doctor may do other tests to check your risk for weight-related health problems. This may include measuring the distance around your waist. A waist measurement of more than 40 inches in men or 35 inches in women can increase the risk of weight-related health problems.  Talk with your doctor about steps you can take to stay healthy or improve your health. You may need to make lifestyle changes to lose weight and stay healthy, such as changing your diet and getting regular exercise.  If you have a BMI lower than 18.5  Your doctor may do other tests to check your risk for health problems.  Talk with your doctor about steps you can take to stay healthy or improve your health. You may need to make lifestyle changes to gain or maintain weight and stay healthy, such as getting more healthy foods in your diet and doing exercises to build muscle.  Where can you learn more?  Go to https://www.HBCS.net/patiented  Enter S176 in the search box to learn more about \"Body Mass Index: Care Instructions.\"  Current as of: April 30, 2024  Content Version: 14.3    2024 [employer].   Care instructions adapted under license by your healthcare professional. If you have questions about a medical condition or this instruction, always ask your healthcare professional. [employer] disclaims any warranty or liability for your use of this information.    Preventive Care Advice   This is general advice given by our system to help you stay healthy. However, your care team may have specific advice just for you. Please talk to your care team about your preventive care needs.  Nutrition  Eat 5 or more servings of fruits and vegetables each day.  Try wheat bread, brown rice and whole grain pasta (instead of white bread, rice, " and pasta).  Get enough calcium and vitamin D. Check the label on foods and aim for 100% of the RDA (recommended daily allowance).  Lifestyle  Exercise at least 150 minutes each week  (30 minutes a day, 5 days a week).  Do muscle strengthening activities 2 days a week. These help control your weight and prevent disease.  No smoking.  Wear sunscreen to prevent skin cancer.  Have a dental exam and cleaning every 6 months.  Yearly exams  See your health care team every year to talk about:  Any changes in your health.  Any medicines your care team has prescribed.  Preventive care, family planning, and ways to prevent chronic diseases.  Shots (vaccines)   HPV shots (up to age 26), if you've never had them before.  Hepatitis B shots (up to age 59), if you've never had them before.  COVID-19 shot: Get this shot when it's due.  Flu shot: Get a flu shot every year.  Tetanus shot: Get a tetanus shot every 10 years.  Pneumococcal, hepatitis A, and RSV shots: Ask your care team if you need these based on your risk.  Shingles shot (for age 50 and up)  General health tests  Diabetes screening:  Starting at age 35, Get screened for diabetes at least every 3 years.  If you are younger than age 35, ask your care team if you should be screened for diabetes.  Cholesterol test: At age 39, start having a cholesterol test every 5 years, or more often if advised.  Bone density scan (DEXA): At age 50, ask your care team if you should have this scan for osteoporosis (brittle bones).  Hepatitis C: Get tested at least once in your life.  STIs (sexually transmitted infections)  Before age 24: Ask your care team if you should be screened for STIs.  After age 24: Get screened for STIs if you're at risk. You are at risk for STIs (including HIV) if:  You are sexually active with more than one person.  You don't use condoms every time.  You or a partner was diagnosed with a sexually transmitted infection.  If you are at risk for HIV, ask about PrEP  medicine to prevent HIV.  Get tested for HIV at least once in your life, whether you are at risk for HIV or not.  Cancer screening tests  Cervical cancer screening: If you have a cervix, begin getting regular cervical cancer screening tests starting at age 21.  Breast cancer scan (mammogram): If you've ever had breasts, begin having regular mammograms starting at age 40. This is a scan to check for breast cancer.  Colon cancer screening: It is important to start screening for colon cancer at age 45.  Have a colonoscopy test every 10 years (or more often if you're at risk) Or, ask your provider about stool tests like a FIT test every year or Cologuard test every 3 years.  To learn more about your testing options, visit:   .  For help making a decision, visit:   https://bit.ly/eh43970.  Prostate cancer screening test: If you have a prostate, ask your care team if a prostate cancer screening test (PSA) at age 55 is right for you.  Lung cancer screening: If you are a current or former smoker ages 50 to 80, ask your care team if ongoing lung cancer screenings are right for you.  For informational purposes only. Not to replace the advice of your health care provider. Copyright   2023 [employer]. All rights reserved. Clinically reviewed by the [facility]itions Program. Kleen Extreme [id number] - REV 01/24.

## 2025-01-13 NOTE — NURSING NOTE
Pt tolerated injection (Influenza Vaccine 65+: Fluzone HD). Site (Left deltoid) was cleansed with alcohol prior to injections. No pain, burning, swelling or redness at the site of the injection. Patient instructed to remain in clinic for 15 minutes afterwards, and to report any adverse reactions.     Pt was accompanied by  and discharged in stable condition via wheelchair.

## 2025-01-14 ENCOUNTER — PATIENT OUTREACH (OUTPATIENT)
Dept: CARE COORDINATION | Facility: CLINIC | Age: 70
End: 2025-01-14
Payer: MEDICARE

## 2025-01-14 LAB — BACTERIA UR CULT: NORMAL

## 2025-01-16 ENCOUNTER — PATIENT OUTREACH (OUTPATIENT)
Dept: CARE COORDINATION | Facility: CLINIC | Age: 70
End: 2025-01-16
Payer: MEDICARE

## 2025-01-28 ENCOUNTER — OFFICE VISIT (OUTPATIENT)
Dept: ENDOCRINOLOGY | Facility: CLINIC | Age: 70
End: 2025-01-28
Payer: MEDICARE

## 2025-01-28 VITALS
HEART RATE: 99 BPM | SYSTOLIC BLOOD PRESSURE: 123 MMHG | OXYGEN SATURATION: 94 % | WEIGHT: 293 LBS | DIASTOLIC BLOOD PRESSURE: 76 MMHG | BODY MASS INDEX: 65.4 KG/M2

## 2025-01-28 DIAGNOSIS — N18.31 TYPE 1 DIABETES MELLITUS WITH STAGE 3A CHRONIC KIDNEY DISEASE (H): Primary | ICD-10-CM

## 2025-01-28 DIAGNOSIS — E03.4 HYPOTHYROIDISM DUE TO ACQUIRED ATROPHY OF THYROID: ICD-10-CM

## 2025-01-28 DIAGNOSIS — E10.22 TYPE 1 DIABETES MELLITUS WITH STAGE 3A CHRONIC KIDNEY DISEASE (H): Primary | ICD-10-CM

## 2025-01-28 PROCEDURE — 99214 OFFICE O/P EST MOD 30 MIN: CPT | Performed by: NURSE PRACTITIONER

## 2025-01-28 RX ORDER — INSULIN LISPRO 100 [IU]/ML
INJECTION, SOLUTION INTRAVENOUS; SUBCUTANEOUS
Qty: 90 ML | Refills: 3 | Status: SHIPPED | OUTPATIENT
Start: 2025-01-28

## 2025-01-28 RX ORDER — LEVOTHYROXINE SODIUM 75 UG/1
TABLET ORAL
Qty: 90 TABLET | Refills: 3 | Status: SHIPPED | OUTPATIENT
Start: 2025-01-28

## 2025-01-28 NOTE — LETTER
1/28/2025      Corinna Farias  [address]  [place] [address]      Dear Colleague,    Thank you for referring your patient, Corinna Farias, to the [facility]. Please see a copy of my visit note below.    [facility] ENDOCRINOLOGY    Diabetes Note 1/28/2025    Corinna Farias, 1955, [id number]          Reason for visit      1. Type 1 diabetes mellitus with stage 3a chronic kidney disease (H)    2. Hypothyroidism, unspecified type        HPI     Corinna Farias is a very pleasant 69 year old old female who presents for follow up.  SUMMARY:    Corinna returns today in follow-up for DM 1. Her current A1c is 6.6 and improved from her previous of 6.9. She continues to use the [employer] FreeStyle CGM, which was downloaded and data was reviewed.     TIR was 83%, Above, 12% and Below, 5%. She is having occasional hypoglycemia, however she feels that it is manageable and nothing that needs to be addressed today. GMI is 6.5.     She remains on Lantus, 65 units in the morning and 55 units in the evening. She takes Humalog dependent upon what she is eating.     Current TSH is 2.81 and ft4 is 1.41. She is taking Levothyroxine 75 mcg daily. She is having no problems referable to her neck.     Dr Melton has recently done a Lipid profile, and LDL was 68. Renal function remains stable and she has no Microalbuminuria.       Blood glucose data:      Past Medical History     Patient Active Problem List   Diagnosis     Bariatric surgery status (GASTRIC BYPASS)     Vitamin D deficiency     Essential hypertension, benign     GERD (gastroesophageal reflux disease)     Hypothyroidism     Microalbuminuric diabetic nephropathy (H)     Morbid obesity (H)     GIOVANI (obstructive sleep apnea)--will not use CPAP     Chronic kidney disease, stage 3     Cervical cancer screening     Encounter for long-term (current) use of insulin (H)     Female climacteric state     Insomnia     Pseudophakia     Type 1  diabetes mellitus (H)     Meningioma (H)        Family History       family history includes Diabetes in her father and mother.    Social History      reports that she has never smoked. She has never used smokeless tobacco. She reports that she does not drink alcohol and does not use drugs.      Review of Systems     Patient has no polyuria or polydipsia, no chest pain, dyspnea or TIA's, no numbness, tingling or pain in extremities  Remainder negative except as noted in HPI.    Vital Signs     /76 (BP Location: Right arm, Patient Position: Sitting, Cuff Size: Adult Large)   Pulse 99   Wt (!) 178.3 kg (393 lb)   LMP  (LMP Unknown)   SpO2 94%   BMI 65.40 kg/m    Wt Readings from Last 3 Encounters:   01/28/25 (!) 178.3 kg (393 lb)   01/13/25 (!) 178.3 kg (393 lb)   07/16/24 (!) 180.1 kg (397 lb)       Physical Exam     Constitutional:  Well developed, Well nourished  HENT:  Normocephalic,   Neck: Thyroid normal, No lymph nodes, Supple  Eyes:  PERRL, Conjunctiva pink  Respiratory:  Normal breath sounds, No respiratory distress  Cardiovascular:  Normal heart rate, Normal rhythm, No murmurs  GI:  Bowel sounds normal, Soft, No tenderness  Musculoskeletal:  No gross deformity or lesions, normal dorsalis pedis pulses  Skin: No acanthosis nigricans, lipoatrophy or lipodystrophy  Neurologic:  Alert & oriented x 3, nonfocal  Psychiatric:  Affect, Mood, Insight appropriate  Diabetic foot exam: no ulcers, charcot's or high risk calluses,      Assessment     1. Type 1 diabetes mellitus with stage 3a chronic kidney disease (H)    2. Hypothyroidism, unspecified type        Plan     Corinna's control has improved. No changes recommended today. Follow-up with me in 6 months.           Rossy Baig NP  [facility]  1/28/2025  8:32 AM        Lab Results     Microalbumin Urine mg/dL   Date Value Ref Range Status   10/15/2020 6.40 (H) 0.00 - 1.99 mg/dL Final       Cholesterol   Date Value Ref Range Status   01/06/2025 136  <200 mg/dL Final     Direct Measure HDL   Date Value Ref Range Status   01/06/2025 47 (L) >=50 mg/dL Final     Triglycerides   Date Value Ref Range Status   01/06/2025 105 <150 mg/dL Final       [unfilled]      Current Medications     Outpatient Medications Prior to Visit   Medication Sig Dispense Refill     ASPIRIN PO Take 81 mg by mouth daily       atorvastatin (LIPITOR) 20 MG tablet Take 1 tablet (20 mg) by mouth daily 90 tablet 3     Continuous Glucose Sensor (FREESTYLE [place] 14 DAY SENSOR) MISC CHANGE SENSOR EVERY 14 DAYS AS DIRECTED 6 each 1     insulin glargine (LANTUS SOLOSTAR) 100 UNIT/ML pen INJECT 65 UNITS UNDER THE SKIN EVERY MORNING AND INJECT 55 UNITS EVERY EVENING 75 mL 3     levothyroxine (SYNTHROID/LEVOTHROID) 75 MCG tablet TAKE 1 TABLET BY MOUTH DAILY 1 HOUR BEFORE OR 2 HOURS AFTER A MEAL 90 tablet 0     lisinopril (ZESTRIL) 20 MG tablet TAKE 1 TABLET(20 MG) BY MOUTH DAILY 90 tablet 2     Multiple Vitamin (DAILY-MICHAEL MULTIVITAMIN) TABS Take 1 tablet by mouth daily. 90 tablet 1     pantoprazole (PROTONIX) 40 MG EC tablet TAKE 1 TABLET(40 MG) BY MOUTH DAILY 90 tablet 1     traZODone (DESYREL) 100 MG tablet TAKE 1 TABLET BY MOUTH AT BEDTIME AS NEEDED FOR SLEEP 90 tablet 1     vitamin D3 25 mcg (1000 units) tablet Take 2 tablets (50 mcg) by mouth daily. 180 tablet 0     HUMALOG KWIKPEN 100 UNIT/ML soln INJECT UNDER THE SKIN AS DIRECTED THREE TIMES DAILY WITH MEALS(MAXIMUM  UNITS PER DAY) 30 mL 0     No facility-administered medications prior to visit.                                                                         Again, thank you for allowing me to participate in the care of your patient.        Sincerely,        Rossy Baig NP    Electronically signed

## 2025-01-28 NOTE — PROGRESS NOTES
[facility] ENDOCRINOLOGY    Diabetes Note 1/28/2025    Corinna Farias, 1955, [id number]          Reason for visit      1. Type 1 diabetes mellitus with stage 3a chronic kidney disease (H)    2. Hypothyroidism, unspecified type        HPI     Corinna Farias is a very pleasant 69 year old old female who presents for follow up.  SUMMARY:    Corinna returns today in follow-up for DM 1. Her current A1c is 6.6 and improved from her previous of 6.9. She continues to use the [employer] FreeStyle CGM, which was downloaded and data was reviewed.     TIR was 83%, Above, 12% and Below, 5%. She is having occasional hypoglycemia, however she feels that it is manageable and nothing that needs to be addressed today. GMI is 6.5.     She remains on Lantus, 65 units in the morning and 55 units in the evening. She takes Humalog dependent upon what she is eating.     Current TSH is 2.81 and ft4 is 1.41. She is taking Levothyroxine 75 mcg daily. She is having no problems referable to her neck.     Dr Melton has recently done a Lipid profile, and LDL was 68. Renal function remains stable and she has no Microalbuminuria.       Blood glucose data:      Past Medical History     Patient Active Problem List   Diagnosis    Bariatric surgery status (GASTRIC BYPASS)    Vitamin D deficiency    Essential hypertension, benign    GERD (gastroesophageal reflux disease)    Hypothyroidism    Microalbuminuric diabetic nephropathy (H)    Morbid obesity (H)    GIOVANI (obstructive sleep apnea)--will not use CPAP    Chronic kidney disease, stage 3    Cervical cancer screening    Encounter for long-term (current) use of insulin (H)    Female climacteric state    Insomnia    Pseudophakia    Type 1 diabetes mellitus (H)    Meningioma (H)        Family History       family history includes Diabetes in her father and mother.    Social History      reports that she has never smoked. She has never used smokeless tobacco. She reports that she does not drink  alcohol and does not use drugs.      Review of Systems     Patient has no polyuria or polydipsia, no chest pain, dyspnea or TIA's, no numbness, tingling or pain in extremities  Remainder negative except as noted in HPI.    Vital Signs     /76 (BP Location: Right arm, Patient Position: Sitting, Cuff Size: Adult Large)   Pulse 99   Wt (!) 178.3 kg (393 lb)   LMP  (LMP Unknown)   SpO2 94%   BMI 65.40 kg/m    Wt Readings from Last 3 Encounters:   01/28/25 (!) 178.3 kg (393 lb)   01/13/25 (!) 178.3 kg (393 lb)   07/16/24 (!) 180.1 kg (397 lb)       Physical Exam     Constitutional:  Well developed, Well nourished  HENT:  Normocephalic,   Neck: Thyroid normal, No lymph nodes, Supple  Eyes:  PERRL, Conjunctiva pink  Respiratory:  Normal breath sounds, No respiratory distress  Cardiovascular:  Normal heart rate, Normal rhythm, No murmurs  GI:  Bowel sounds normal, Soft, No tenderness  Musculoskeletal:  No gross deformity or lesions, normal dorsalis pedis pulses  Skin: No acanthosis nigricans, lipoatrophy or lipodystrophy  Neurologic:  Alert & oriented x 3, nonfocal  Psychiatric:  Affect, Mood, Insight appropriate  Diabetic foot exam: no ulcers, charcot's or high risk calluses,      Assessment     1. Type 1 diabetes mellitus with stage 3a chronic kidney disease (H)    2. Hypothyroidism, unspecified type        Plan     Corinna's control has improved. No changes recommended today. Follow-up with me in 6 months.           Rossy Baig NP  [facility]  1/28/2025  8:32 AM        Lab Results     Microalbumin Urine mg/dL   Date Value Ref Range Status   10/15/2020 6.40 (H) 0.00 - 1.99 mg/dL Final       Cholesterol   Date Value Ref Range Status   01/06/2025 136 <200 mg/dL Final     Direct Measure HDL   Date Value Ref Range Status   01/06/2025 47 (L) >=50 mg/dL Final     Triglycerides   Date Value Ref Range Status   01/06/2025 105 <150 mg/dL Final       [unfilled]      Current Medications     Outpatient Medications  Prior to Visit   Medication Sig Dispense Refill    ASPIRIN PO Take 81 mg by mouth daily      atorvastatin (LIPITOR) 20 MG tablet Take 1 tablet (20 mg) by mouth daily 90 tablet 3    Continuous Glucose Sensor (FREESTYLE [place] 14 DAY SENSOR) [facility] CHANGE SENSOR EVERY 14 DAYS AS DIRECTED 6 each 1    insulin glargine (LANTUS SOLOSTAR) 100 UNIT/ML pen INJECT 65 UNITS UNDER THE SKIN EVERY MORNING AND INJECT 55 UNITS EVERY EVENING 75 mL 3    levothyroxine (SYNTHROID/LEVOTHROID) 75 MCG tablet TAKE 1 TABLET BY MOUTH DAILY 1 HOUR BEFORE OR 2 HOURS AFTER A MEAL 90 tablet 0    lisinopril (ZESTRIL) 20 MG tablet TAKE 1 TABLET(20 MG) BY MOUTH DAILY 90 tablet 2    Multiple Vitamin (DAILY-MICHAEL MULTIVITAMIN) TABS Take 1 tablet by mouth daily. 90 tablet 1    pantoprazole (PROTONIX) 40 MG EC tablet TAKE 1 TABLET(40 MG) BY MOUTH DAILY 90 tablet 1    traZODone (DESYREL) 100 MG tablet TAKE 1 TABLET BY MOUTH AT BEDTIME AS NEEDED FOR SLEEP 90 tablet 1    vitamin D3 25 mcg (1000 units) tablet Take 2 tablets (50 mcg) by mouth daily. 180 tablet 0    HUMALOG KWIKPEN 100 UNIT/ML soln INJECT UNDER THE SKIN AS DIRECTED THREE TIMES DAILY WITH MEALS(MAXIMUM  UNITS PER DAY) 30 mL 0     No facility-administered medications prior to visit.

## 2025-04-04 DIAGNOSIS — E11.8 TYPE 2 DIABETES MELLITUS WITH COMPLICATION, WITH LONG-TERM CURRENT USE OF INSULIN (H): ICD-10-CM

## 2025-04-04 DIAGNOSIS — Z79.4 TYPE 2 DIABETES MELLITUS WITH COMPLICATION, WITH LONG-TERM CURRENT USE OF INSULIN (H): ICD-10-CM

## 2025-04-07 RX ORDER — ATORVASTATIN CALCIUM 20 MG/1
20 TABLET, FILM COATED ORAL DAILY
Qty: 90 TABLET | Refills: 0 | Status: SHIPPED | OUTPATIENT
Start: 2025-04-07

## 2025-04-15 DIAGNOSIS — K21.9 GASTROESOPHAGEAL REFLUX DISEASE, UNSPECIFIED WHETHER ESOPHAGITIS PRESENT: ICD-10-CM

## 2025-04-15 DIAGNOSIS — I10 ESSENTIAL HYPERTENSION, BENIGN: ICD-10-CM

## 2025-04-16 RX ORDER — LISINOPRIL 20 MG/1
20 TABLET ORAL DAILY
Qty: 90 TABLET | Refills: 1 | Status: SHIPPED | OUTPATIENT
Start: 2025-04-16

## 2025-04-16 RX ORDER — PANTOPRAZOLE SODIUM 40 MG/1
40 TABLET, DELAYED RELEASE ORAL DAILY
Qty: 90 TABLET | Refills: 1 | Status: SHIPPED | OUTPATIENT
Start: 2025-04-16

## 2025-04-28 DIAGNOSIS — G47.33 OSA (OBSTRUCTIVE SLEEP APNEA): ICD-10-CM

## 2025-04-28 RX ORDER — TRAZODONE HYDROCHLORIDE 100 MG/1
TABLET ORAL
Qty: 90 TABLET | Refills: 1 | Status: SHIPPED | OUTPATIENT
Start: 2025-04-28

## 2025-05-08 ENCOUNTER — TELEPHONE (OUTPATIENT)
Dept: ENDOCRINOLOGY | Facility: CLINIC | Age: 70
End: 2025-05-08
Payer: MEDICARE

## 2025-05-08 DIAGNOSIS — E10.69 TYPE 1 DIABETES MELLITUS WITH OTHER SPECIFIED COMPLICATION (H): ICD-10-CM

## 2025-05-08 RX ORDER — INSULIN GLARGINE 100 [IU]/ML
INJECTION, SOLUTION SUBCUTANEOUS
Qty: 75 ML | Refills: 0 | Status: SHIPPED | OUTPATIENT
Start: 2025-05-08

## 2025-05-08 NOTE — TELEPHONE ENCOUNTER
Lm for pt to c/b to discuss below.   Lantus sent.     Pt should have refills on file for humalog.    90 day supply with 3 refills sent in January.   If pt is getting partial fill, may due to supply issues with pharmacy, or insurance may now be contracted differently with pharmacy. Pt would need to contact pharmacy directly.   
[facility] Call Center    Phone Message    May a detailed message be left on voicemail: yes     Reason for Call: Medication Refill Request    Has the patient contacted the pharmacy for the refill? Yes   Name of medication being requested: insulin glargine (LANTUS SOLOSTAR) 100 UNIT/ML pen  and  HUMALOG KWIKPEN 100 UNIT/ML soln    Provider who prescribed the medication: Hannah Baig  Pharmacy:   [facility] #[id number] [address]     Date medication is needed: ASAP     Per pt has a questions about the Humalog and isnt sure if she suppose to get a partical of this RX or what,. Please call her back on that medication. Thank you    Action Taken: Message routed to:  [facility] ENDO    Travel Screening: Not Applicable     Date of Service:                                                                      
The patient said she asked the pharmacy to only give her 30 day supply for the last 3 months, patient refused to provide more details please review and follow up thank you.  
(3) assistive equipment and person

## 2025-06-24 ENCOUNTER — HOSPITAL ENCOUNTER (OUTPATIENT)
Dept: MRI IMAGING | Facility: HOSPITAL | Age: 70
Discharge: HOME | End: 2025-06-24
Attending: RADIOLOGY
Payer: MEDICARE

## 2025-06-24 DIAGNOSIS — D32.9 MENINGIOMA (H): ICD-10-CM

## 2025-06-24 PROCEDURE — 70553 MRI BRAIN STEM W/O & W/DYE: CPT

## 2025-06-24 PROCEDURE — 255N000002 HC RX 255 OP 636: Performed by: RADIOLOGY

## 2025-06-24 PROCEDURE — A9585 GADOBUTROL INJECTION: HCPCS | Performed by: RADIOLOGY

## 2025-06-24 RX ORDER — GADOBUTROL 604.72 MG/ML
17 INJECTION INTRAVENOUS ONCE
Status: COMPLETED | OUTPATIENT
Start: 2025-06-24 | End: 2025-06-24

## 2025-06-24 RX ADMIN — GADOBUTROL 17 ML: 604.72 INJECTION INTRAVENOUS at 07:24

## 2025-06-26 ENCOUNTER — OFFICE VISIT (OUTPATIENT)
Dept: RADIATION ONCOLOGY | Facility: HOSPITAL | Age: 70
End: 2025-06-26
Attending: RADIOLOGY
Payer: MEDICARE

## 2025-06-26 VITALS
HEART RATE: 76 BPM | DIASTOLIC BLOOD PRESSURE: 74 MMHG | OXYGEN SATURATION: 94 % | SYSTOLIC BLOOD PRESSURE: 144 MMHG | RESPIRATION RATE: 20 BRPM

## 2025-06-26 DIAGNOSIS — D32.9 MENINGIOMA (H): Primary | ICD-10-CM

## 2025-06-26 PROCEDURE — G0463 HOSPITAL OUTPT CLINIC VISIT: HCPCS | Performed by: RADIOLOGY

## 2025-06-26 ASSESSMENT — PAIN SCALES - GENERAL: PAINLEVEL_OUTOF10: NO PAIN (0)

## 2025-06-26 NOTE — PROGRESS NOTES
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        TYPE OF RADIATION THERAPY ADMINISTERED:  3000 cGy in 5 treatments given from 2/7/2024 - 2/16/2024.      INTERVAL SINCE COMPLETION OF RADIATION THERAPY: 1 year and 4 months.      SUBJECTIVE:  Ms. Farias is a 69 year old female who  is diabetic and takes insulin and is morbidly obese (390 lbs) with a BMI of 64.  Patient was found to have meningioma by recent MRI scan for stroke evaluation.  The MRI brain 12/4/2023  showed meningioma along the planum sphenoidale. This measures approximately 2.8 cm anteriorly posteriorly by 2.2 cm transversely by 2.0 cm craniocaudally on the current study. There is no surrounding edema. On the MRI from 08/27/2023 without contrast the meningioma measured approximately 2.3 cm anteriorly posteriorly by 2.2 cm transversely by 1.6 cm craniocaudally. On the CT from 08/30/2015 it is very hard to discretely visualize the meningioma. Retrospectively there may be a tiny meningioma approximately 5 mm x 7 mm as seen on axial image #17. No on the current study.  There is also a small tiny meningioma along the inferior right frontal lobe.  Patient is clinically asymptomatic.  Her case has been discussed at neuro tumor conference and the consensus recommendation is to consider stereotactic radiosurgery. The patient elected proceed with definitive radiation therapy for her meningioma and had radiation therapy in our clinic with a total dose of 3000 cGy in 5 treatments given from 2/7/2024 - 2/16/2024. She tolerated radiation therapy well with minimal side effect.      The patient has been recovering well since radiation therapy and denies any pain and discomfort related to the treatment at time of  evaluation.  She is here for routine post therapy office follow-up.     Medications were reviewed and are up to date on EPIC.    The following portions of the patient's history were reviewed and updated as appropriate: allergies, current medications, past family history, past medical history, past social history, past surgical history and problem list.    Review of Systems:      General  Constitutional  Constitutional (WDL): All constitutional elements are within defined limits  EENT  Eye Disorders  Eye Disorder (WDL): All eye disorder elements are within defined limits  Ear Disorders  Ear Disorder (WDL): All ear disorder elements are within defined limits  Respiratory  Respiratory  Respiratory (WDL): Exceptions to WDL  Dyspnea: Shortness of breath with moderate exertion  Cardiovascular  Cardiovascular  Cardiovascular (WDL): All cardiovascular elements are within defined limits  Gastrointestinal  Gastrointestinal  Gastrointestinal (WDL): All gastrointestinal elements are within defined limits  Musculoskeletal  Musculoskeletal and Connective Tissue Disorders  Musculoskeletal & Connective (WDL): Exceptions to WDL  Arthralgia: Mild pain  Generalized Muscle Weakness: Symptomatic OR perceived by patient but not evident on physical exam  Integumentary  Integumentary  Integumentary (WDL): All integumentary elements are within defined limits  Neurological  Neurosensory  Neurosensory (WDL): Exceptions to WDL  Ataxia: Asymptomatic OR clinical or diagnostic observations only OR intervention not indicated (w/c)  Peripheral Sensory Neuropathy: Moderate symptoms OR limiting instrumental ADL  Genitourinary/Reproductive  Genitourinary  Genitourinary (WDL): Assessment not pertinent to visit  Lymphatic  Lymph System Disorders  Lymph (WDL): Assessment not pertinent to visit  Pain  Pain Score: No Pain (0)  AUA Assessment                                                              Accompanied by  Accompanied By: spouse  (Ganesh)    Objective:      PHYSICAL EXAMINATION:    BP (!) 144/74   Pulse 76   Resp 20   LMP  (LMP Unknown)   SpO2 94%     Gen: Alert, in NAD  Eyes: PERRL, EOMI, sclera anicteric  Pulm: No wheezing, stridor or respiratory distress  CV: Well-perfused, no cyanosis, no pedal edema  Back: No step-offs or pain to palpation along the thoracolumbar spine  Rectal: Deferred  : Deferred  Musculoskeletal: Normal muscle bulk and tone  Skin: Normal color and turgor  Neurologic: A/Ox3, CN II-XII intact, normal gait and station  Psychiatric: Appropriate mood and affect     Imaging: Imaging results 30 days: MR Brain w/o & w Contrast  Result Date: 6/24/2025  EXAM: MR BRAIN W/O and W CONTRAST LOCATION: [facility] DATE: 6/24/2025 INDICATION: Meningioma (H). COMPARISON: 12/17/2024. 5/13/2024. 12/4/2023. CONTRAST: 17 mL Gadavist. TECHNIQUE: Routine multiplanar multisequence head MRI without and with intravenous contrast. FINDINGS: INTRACRANIAL CONTENTS: The enhancing extra-axial mass lesion along the planum sphenoidale measures approximately 2.5 cm AP by 2.5 cm TV by approximately 1.9 cm SI, overall representing a minimal decrease from 12/17/2024, when it measured approximately 2.8 x 2.6 x 2.0 cm. However, there has been particular increase in nodular components along the right superolateral margin of the dominant lesion measuring approximately 7 mm (image 159 of series 19) and along the left superolateral margin of the dominant lesion measuring approximately 3 mm (image 159). These components measured approximately 1-2 mm on 12/17/2024. There has been progressive heterogeneity of lesional enhancement since 5/13/2024, particularly along the superior margin of the lesion  this could indicate underlying necrotic components. Since 12/17/2024, there has been moderate increase in surrounding vasogenic edema, particularly in the medial right frontal lobe. The midline is preserved and the basal cisterns remain  patent. The 4 mm  nodular focus of enhancement along the right inferior frontal lobe/greater sphenoid wing (image 74 of series 18) remains stable, again without surrounding vasogenic edema. No new enhancing lesions are demonstrated. There is no pattern of diffusion restriction to suggest recent infarction. There are no findings to suggest interval intracranial hemorrhage. There is mild T2-hyperintense white matter signal abnormality. Although nonspecific, this commonly reflects chronic small vessel ischemic change.  The ventricles, sulci and cisterns are appropriate in size and configuration. The cerebellar tonsils are appropriately positioned with a patent foramen magnum. The major intracranial flow voids are unremarkable. SELLA: No abnormality accounting for technique. OSSEOUS STRUCTURES/SOFT TISSUES: Unremarkable marrow signal. Unremarkable extracranial soft tissues. Limited images of the upper cervical spine demonstrate no acute abnormality.  ORBITS: Prior bilateral cataract surgery. Visualized portions of the orbits are otherwise unremarkable. SINUSES/MASTOIDS: Mild mucosal thickening scattered about the paranasal sinuses. Scattered fluid/membrane thickening in the right mastoid air cells. No apparent mass in the posterior nasopharynx or skull base.     IMPRESSION: 1.  The presumed planum sphenoidale meningioma demonstrates minimal overall decrease in size since 12/17/2024. However, nodular components along the left and right superolateral margins of the dominant lesion have enlarged since the prior examination. 2.  Moderate surrounding vasogenic edema, particularly in the inferomedial right frontal lobe. 3.  Stable presumed subcentimeter meningioma along the right inferior frontal lobe. 4.  No evidence for new intracranial mass effect.                Impression     69 year old female with a clinical diagnosis of meningioma involving the planum sphenoidale managing 2.8 cm with MRI evidence of increased  significantly in size from CT of 2015.  The patient elected proceed with definitive radiation therapy for her meningioma and had radiation therapy in our clinic with a total dose of 3000 cGy in 5 treatments given from 2/7/2024 - 2/16/2024.     Assessment & Plan:     1.  I have personally reviewed her restaging MRI scan and compared to the previous MRI scan and the radiation therapy field.  There is no evidence of recurrence.  The area of nodular components along the left and right superolateral margins of the dominant lesion is within the previous radiation therapy field.  We will continue to monitor. The patient is scheduled return to radiation oncology in 6 months for office follow-up and MRI scan.     2.  Continue follow-up with Dr. Lionel Guerin, neurosurgery and Dr. Masood Melton, PCP as planned.     Pain Management Plan: NA     Face to face time 30 minutes with > 80% spent on consultation, education and coordination of care.         Kary Rust MD  Department of Radiation Oncology   [facility] Radiation Oncology  Tel: [phone]  Page: [phone]    [facility]  [address] [place] [address]    CC:  Patient Care Team:  Masood Melton MD as PCP - General  Elieser Melton MD as MD (Surgery)  Masood Melton MD as Assigned PCP  Rossy Baig NP as Nurse Practitioner  Lionel Guerin MD as Assigned Neuroscience Provider  Kary Rust MD as MD (Radiation Oncology)  Kary Rust MD as Assigned Cancer Care Provider

## 2025-06-26 NOTE — PROGRESS NOTES
"Oncology Rooming Note    June 26, 2025 10:25 AM   Corinna Farias is a 69 year old female who presents for:    Chief Complaint   Patient presents with    Oncology Clinic Visit    Radiation Therapy     Follow up     Initial Vitals: BP (!) 144/74   Pulse 76   Resp 20   LMP  (LMP Unknown)   SpO2 94%  Estimated body mass index is 65.4 kg/m  as calculated from the following:    Height as of 1/13/25: 1.651 m (5' 5\").    Weight as of 1/28/25: 178.3 kg (393 lb). There is no height or weight on file to calculate BSA.  No Pain (0) Comment: Data Unavailable   No LMP recorded (lmp unknown). Patient is postmenopausal.  Allergies reviewed: Yes  Medications reviewed: Yes    Medications: Medication refills not needed today.  Pharmacy name entered into [facility]: [id number] [address]    Frailty Screening:   Is the patient here for a new oncology consult visit in cancer care? 2. No    PHQ9:  Did this patient require a PHQ9?: No      Clinical concerns: follow up, MRI done 06/24/2025 no concerns or complaints Dr. Rust was notified.      Nancy Green RN              "

## 2025-06-26 NOTE — LETTER
6/26/2025      Corinna Farias  [address]  [place] [address]      Dear Colleague,    Thank you for referring your patient, Corinna Farias, to the [facility] RADIATION ONCOLOGY [place]. Please see a copy of my visit note below.    [facility] Radiation Oncology Follow Up     Patient: Corinna Farias  MRN: [id number]  Date of Service: 06/26/2025       DISEASE TREATED:  Clinical diagnosis of meningioma involving the planum sphenoidale managing 2.8 cm with MRI evidence of increased significantly in size from CT of 2015.        TYPE OF RADIATION THERAPY ADMINISTERED:  3000 cGy in 5 treatments given from 2/7/2024 - 2/16/2024.      INTERVAL SINCE COMPLETION OF RADIATION THERAPY: 1 year and 4 months.      SUBJECTIVE:  Ms. Farias is a 69 year old female who  is diabetic and takes insulin and is morbidly obese (390 lbs) with a BMI of 64.  Patient was found to have meningioma by recent MRI scan for stroke evaluation.  The MRI brain 12/4/2023  showed meningioma along the planum sphenoidale. This measures approximately 2.8 cm anteriorly posteriorly by 2.2 cm transversely by 2.0 cm craniocaudally on the current study. There is no surrounding edema. On the MRI from 08/27/2023 without contrast the meningioma measured approximately 2.3 cm anteriorly posteriorly by 2.2 cm transversely by 1.6 cm craniocaudally. On the CT from 08/30/2015 it is very hard to discretely visualize the meningioma. Retrospectively there may be a tiny meningioma approximately 5 mm x 7 mm as seen on axial image #17. No on the current study.  There is also a small tiny meningioma along the inferior right frontal lobe.  Patient is clinically asymptomatic.  Her case has been discussed at neuro tumor conference and the consensus recommendation is to consider stereotactic radiosurgery. The patient elected proceed with definitive radiation therapy for her meningioma and had radiation therapy in our clinic with a total dose of 3000  cGy in 5 treatments given from 2/7/2024 - 2/16/2024. She tolerated radiation therapy well with minimal side effect.      The patient has been recovering well since radiation therapy and denies any pain and discomfort related to the treatment at time of evaluation.  She is here for routine post therapy office follow-up.     Medications were reviewed and are up to date on EPIC.    The following portions of the patient's history were reviewed and updated as appropriate: allergies, current medications, past family history, past medical history, past social history, past surgical history and problem list.    Review of Systems:      General  Constitutional  Constitutional (WDL): All constitutional elements are within defined limits  EENT  Eye Disorders  Eye Disorder (WDL): All eye disorder elements are within defined limits  Ear Disorders  Ear Disorder (WDL): All ear disorder elements are within defined limits  Respiratory  Respiratory  Respiratory (WDL): Exceptions to WDL  Dyspnea: Shortness of breath with moderate exertion  Cardiovascular  Cardiovascular  Cardiovascular (WDL): All cardiovascular elements are within defined limits  Gastrointestinal  Gastrointestinal  Gastrointestinal (WDL): All gastrointestinal elements are within defined limits  Musculoskeletal  Musculoskeletal and Connective Tissue Disorders  Musculoskeletal & Connective (WDL): Exceptions to WDL  Arthralgia: Mild pain  Generalized Muscle Weakness: Symptomatic OR perceived by patient but not evident on physical exam  Integumentary  Integumentary  Integumentary (WDL): All integumentary elements are within defined limits  Neurological  Neurosensory  Neurosensory (WDL): Exceptions to WDL  Ataxia: Asymptomatic OR clinical or diagnostic observations only OR intervention not indicated (w/c)  Peripheral Sensory Neuropathy: Moderate symptoms OR limiting instrumental ADL  Genitourinary/Reproductive  Genitourinary  Genitourinary (WDL): Assessment not pertinent to  visit  Lymphatic  Lymph System Disorders  Lymph (WDL): Assessment not pertinent to visit  Pain  Pain Score: No Pain (0)  AUA Assessment                                                              Accompanied by  Accompanied By: spouse (Ganesh)    Objective:      PHYSICAL EXAMINATION:    BP (!) 144/74   Pulse 76   Resp 20   LMP  (LMP Unknown)   SpO2 94%     Gen: Alert, in NAD  Eyes: PERRL, EOMI, sclera anicteric  Pulm: No wheezing, stridor or respiratory distress  CV: Well-perfused, no cyanosis, no pedal edema  Back: No step-offs or pain to palpation along the thoracolumbar spine  Rectal: Deferred  : Deferred  Musculoskeletal: Normal muscle bulk and tone  Skin: Normal color and turgor  Neurologic: A/Ox3, CN II-XII intact, normal gait and station  Psychiatric: Appropriate mood and affect     Imaging: Imaging results 30 days: MR Brain w/o & w Contrast  Result Date: 6/24/2025  EXAM: MR BRAIN W/O and W CONTRAST LOCATION: [facility] DATE: 6/24/2025 INDICATION: Meningioma (H). COMPARISON: 12/17/2024. 5/13/2024. 12/4/2023. CONTRAST: 17 mL Gadavist. TECHNIQUE: Routine multiplanar multisequence head MRI without and with intravenous contrast. FINDINGS: INTRACRANIAL CONTENTS: The enhancing extra-axial mass lesion along the planum sphenoidale measures approximately 2.5 cm AP by 2.5 cm TV by approximately 1.9 cm SI, overall representing a minimal decrease from 12/17/2024, when it measured approximately 2.8 x 2.6 x 2.0 cm. However, there has been particular increase in nodular components along the right superolateral margin of the dominant lesion measuring approximately 7 mm (image 159 of series 19) and along the left superolateral margin of the dominant lesion measuring approximately 3 mm (image 159). These components measured approximately 1-2 mm on 12/17/2024. There has been progressive heterogeneity of lesional enhancement since 5/13/2024, particularly along the superior margin of the lesion   this could indicate underlying necrotic components. Since 12/17/2024, there has been moderate increase in surrounding vasogenic edema, particularly in the medial right frontal lobe. The midline is preserved and the basal cisterns remain patent. The 4 mm  nodular focus of enhancement along the right inferior frontal lobe/greater sphenoid wing (image 74 of series 18) remains stable, again without surrounding vasogenic edema. No new enhancing lesions are demonstrated. There is no pattern of diffusion restriction to suggest recent infarction. There are no findings to suggest interval intracranial hemorrhage. There is mild T2-hyperintense white matter signal abnormality. Although nonspecific, this commonly reflects chronic small vessel ischemic change.  The ventricles, sulci and cisterns are appropriate in size and configuration. The cerebellar tonsils are appropriately positioned with a patent foramen magnum. The major intracranial flow voids are unremarkable. SELLA: No abnormality accounting for technique. OSSEOUS STRUCTURES/SOFT TISSUES: Unremarkable marrow signal. Unremarkable extracranial soft tissues. Limited images of the upper cervical spine demonstrate no acute abnormality.  ORBITS: Prior bilateral cataract surgery. Visualized portions of the orbits are otherwise unremarkable. SINUSES/MASTOIDS: Mild mucosal thickening scattered about the paranasal sinuses. Scattered fluid/membrane thickening in the right mastoid air cells. No apparent mass in the posterior nasopharynx or skull base.     IMPRESSION: 1.  The presumed planum sphenoidale meningioma demonstrates minimal overall decrease in size since 12/17/2024. However, nodular components along the left and right superolateral margins of the dominant lesion have enlarged since the prior examination. 2.  Moderate surrounding vasogenic edema, particularly in the inferomedial right frontal lobe. 3.  Stable presumed subcentimeter meningioma along the right inferior  frontal lobe. 4.  No evidence for new intracranial mass effect.                Impression     69 year old female with a clinical diagnosis of meningioma involving the planum sphenoidale managing 2.8 cm with MRI evidence of increased significantly in size from CT of 2015.  The patient elected proceed with definitive radiation therapy for her meningioma and had radiation therapy in our clinic with a total dose of 3000 cGy in 5 treatments given from 2/7/2024 - 2/16/2024.     Assessment & Plan:     1.  I have personally reviewed her restaging MRI scan and compared to the previous MRI scan and the radiation therapy field.  There is no evidence of recurrence.  The area of nodular components along the left and right superolateral margins of the dominant lesion is within the previous radiation therapy field.  We will continue to monitor. The patient is scheduled return to radiation oncology in 6 months for office follow-up and MRI scan.     2.  Continue follow-up with Dr. Lionel Guerin, neurosurgery and Dr. Masood Melton, PCP as planned.     Pain Management Plan: NA     Face to face time 30 minutes with > 80% spent on consultation, education and coordination of care.         Kary Rust MD  Department of Radiation Oncology   [facility] Radiation Oncology  Tel: [phone]  Page: [phone]    [facility]  [address] [place] [address]     [address]   [place] [address]    CC:  Patient Care Team:  Masood Melton MD as PCP - General  Elieser Melton MD as MD (Surgery)  Masood Melton MD as Assigned PCP  Rossy Baig NP as Nurse Practitioner  Lionel Guerin MD as Assigned Neuroscience Provider  Kary Rust MD as MD (Radiation Oncology)  Kary Rust MD as Assigned Cancer Care Provider      Oncology Rooming Note    June 26, 2025 10:25 AM   Corinna Farias is a 69 year old female who presents for:    Chief Complaint   Patient presents with     Oncology Clinic Visit  "    Radiation Therapy     Follow up     Initial Vitals: BP (!) 144/74   Pulse 76   Resp 20   LMP  (LMP Unknown)   SpO2 94%  Estimated body mass index is 65.4 kg/m  as calculated from the following:    Height as of 1/13/25: 1.651 m (5' 5\").    Weight as of 1/28/25: 178.3 kg (393 lb). There is no height or weight on file to calculate BSA.  No Pain (0) Comment: Data Unavailable   No LMP recorded (lmp unknown). Patient is postmenopausal.  Allergies reviewed: Yes  Medications reviewed: Yes    Medications: Medication refills not needed today.  Pharmacy name entered into "[employer]": [facility][id number] [address]    Frailty Screening:   Is the patient here for a new oncology consult visit in cancer care? 2. No    PHQ9:  Did this patient require a PHQ9?: No      Clinical concerns: follow up, MRI done 06/24/2025 no concerns or complaints Dr. Rust was notified.      Nancy Green RN                Again, thank you for allowing me to participate in the care of your patient.        Sincerely,        Kary Rust MD    Electronically signed"

## 2025-07-12 DIAGNOSIS — E11.8 TYPE 2 DIABETES MELLITUS WITH COMPLICATION, WITH LONG-TERM CURRENT USE OF INSULIN (H): ICD-10-CM

## 2025-07-12 DIAGNOSIS — Z79.4 TYPE 2 DIABETES MELLITUS WITH COMPLICATION, WITH LONG-TERM CURRENT USE OF INSULIN (H): ICD-10-CM

## 2025-07-14 RX ORDER — ATORVASTATIN CALCIUM 20 MG/1
20 TABLET, FILM COATED ORAL DAILY
Qty: 90 TABLET | Refills: 0 | Status: SHIPPED | OUTPATIENT
Start: 2025-07-14

## 2025-07-15 ENCOUNTER — OFFICE VISIT (OUTPATIENT)
Dept: INTERNAL MEDICINE | Facility: CLINIC | Age: 70
End: 2025-07-15
Payer: MEDICARE

## 2025-07-15 VITALS
TEMPERATURE: 98.7 F | WEIGHT: 293 LBS | RESPIRATION RATE: 22 BRPM | HEART RATE: 104 BPM | SYSTOLIC BLOOD PRESSURE: 136 MMHG | OXYGEN SATURATION: 95 % | DIASTOLIC BLOOD PRESSURE: 80 MMHG | BODY MASS INDEX: 48.82 KG/M2 | HEIGHT: 65 IN

## 2025-07-15 DIAGNOSIS — Z78.0 MENOPAUSE: ICD-10-CM

## 2025-07-15 DIAGNOSIS — I10 ESSENTIAL HYPERTENSION, BENIGN: ICD-10-CM

## 2025-07-15 DIAGNOSIS — E03.9 HYPOTHYROIDISM, UNSPECIFIED TYPE: ICD-10-CM

## 2025-07-15 DIAGNOSIS — E55.9 VITAMIN D DEFICIENCY: ICD-10-CM

## 2025-07-15 DIAGNOSIS — Z12.31 ENCOUNTER FOR SCREENING MAMMOGRAM FOR BREAST CANCER: ICD-10-CM

## 2025-07-15 DIAGNOSIS — E10.22 TYPE 1 DIABETES MELLITUS WITH STAGE 3A CHRONIC KIDNEY DISEASE (H): Primary | ICD-10-CM

## 2025-07-15 DIAGNOSIS — N18.31 STAGE 3A CHRONIC KIDNEY DISEASE (H): ICD-10-CM

## 2025-07-15 DIAGNOSIS — N18.31 TYPE 1 DIABETES MELLITUS WITH STAGE 3A CHRONIC KIDNEY DISEASE (H): Primary | ICD-10-CM

## 2025-07-15 PROCEDURE — 3079F DIAST BP 80-89 MM HG: CPT | Performed by: INTERNAL MEDICINE

## 2025-07-15 PROCEDURE — 91320 SARSCV2 VAC 30MCG TRS-SUC IM: CPT | Performed by: INTERNAL MEDICINE

## 2025-07-15 PROCEDURE — G2211 COMPLEX E/M VISIT ADD ON: HCPCS | Performed by: INTERNAL MEDICINE

## 2025-07-15 PROCEDURE — 99214 OFFICE O/P EST MOD 30 MIN: CPT | Mod: 25 | Performed by: INTERNAL MEDICINE

## 2025-07-15 PROCEDURE — 90480 ADMN SARSCOV2 VAC 1/ONLY CMP: CPT | Performed by: INTERNAL MEDICINE

## 2025-07-15 PROCEDURE — 3075F SYST BP GE 130 - 139MM HG: CPT | Performed by: INTERNAL MEDICINE

## 2025-07-15 NOTE — PROGRESS NOTES
1. Type 1 diabetes mellitus with stage 3a chronic kidney disease (H) (Primary)  Per endocrinology.  I strongly encouraged her to get in for her eye exam.  - Hemoglobin A1c; Future  - Adult Eye  Referral; Future    2. Hypothyroidism, unspecified type  Check TSH.  - TSH with free T4 reflex; Future    3. Essential hypertension, benign  Blood pressure was high on my check.  I will recheck again and if still elevated will increase the lisinopril.  - Comprehensive metabolic panel (BMP + Alb, Alk Phos, ALT, AST, Total. Bili, TP); Future    4. Stage 3a chronic kidney disease (H)  Renal function today for monitoring.  - Comprehensive metabolic panel (BMP + Alb, Alk Phos, ALT, AST, Total. Bili, TP); Future    5. Vitamin D deficiency  Continue vitamin D supplementation    6. Menopause  I have urged a bone density.  - DEXA HIP/PELVIS/SPINE - Future; Future    7. Encounter for screening mammogram for breast cancer  I again have urged mammogram  - MA Screen Bilateral w/Kevon; Future     Subjective   Corinna is a 69 year old, presenting for the following health issues:  RECHECK (6-month health check) and Hypertension      7/15/2025     9:46 AM   Additional Questions   Roomed by Jane   Accompanied by Alone     History of Present Illness       Hypertension: She presents for follow up of hypertension.  She does not check blood pressure  regularly outside of the clinic. Outpatient blood pressures have not been over 140/90. She does not follow a low salt diet.     She eats 0-1 servings of fruits and vegetables daily.She consumes 1 sweetened beverage(s) daily.She exercises with enough effort to increase her heart rate 20 to 29 minutes per day.  She exercises with enough effort to increase her heart rate 3 or less days per week.   She is taking medications regularly.            Corinna comes in today for follow-up.  She has diabetes is being treated as type I.  She is morbidly obese with a BMI over 65.  Does not get around very  "easily.  Uses a scooter.  She tells me she feels well.  Offers no somatic concerns.  She still has not had an eye exam.  She is not checking her blood pressure.  She had a meningioma treated with radiation and will have a 6-month follow-up on that.  No other new concerns for me.          Objective    BP (!) 132/93 (BP Location: Left arm, Patient Position: Sitting, Cuff Size: Adult Large)   Pulse 104   Temp 98.7  F (37.1  C) (Oral)   Resp 22   Ht 1.651 m (5' 5\")   Wt (!) 178.3 kg (393 lb)   LMP  (LMP Unknown)   SpO2 95%   BMI 65.40 kg/m    Body mass index is 65.4 kg/m .  Physical Exam       Blood pressure was pretty high when I checked it.  I but I believe the cuff was malfunctioning.        Signed Electronically by: MAGALI JEAN-BAPTISTE MD    "

## 2025-07-16 ENCOUNTER — PATIENT OUTREACH (OUTPATIENT)
Dept: CARE COORDINATION | Facility: CLINIC | Age: 70
End: 2025-07-16
Payer: MEDICARE

## 2025-07-17 ENCOUNTER — LAB (OUTPATIENT)
Dept: LAB | Facility: CLINIC | Age: 70
End: 2025-07-17
Payer: MEDICARE

## 2025-07-17 DIAGNOSIS — E10.22 TYPE 1 DIABETES MELLITUS WITH STAGE 3A CHRONIC KIDNEY DISEASE (H): ICD-10-CM

## 2025-07-17 DIAGNOSIS — E03.9 HYPOTHYROIDISM, UNSPECIFIED TYPE: ICD-10-CM

## 2025-07-17 DIAGNOSIS — N18.31 STAGE 3A CHRONIC KIDNEY DISEASE (H): ICD-10-CM

## 2025-07-17 DIAGNOSIS — N18.31 TYPE 1 DIABETES MELLITUS WITH STAGE 3A CHRONIC KIDNEY DISEASE (H): ICD-10-CM

## 2025-07-17 DIAGNOSIS — I10 ESSENTIAL HYPERTENSION, BENIGN: ICD-10-CM

## 2025-07-17 LAB
ALBUMIN SERPL BCG-MCNC: 3.7 G/DL (ref 3.5–5.2)
ALP SERPL-CCNC: 114 U/L (ref 40–150)
ALT SERPL W P-5'-P-CCNC: 16 U/L (ref 0–50)
ANION GAP SERPL CALCULATED.3IONS-SCNC: 10 MMOL/L (ref 7–15)
AST SERPL W P-5'-P-CCNC: 20 U/L (ref 0–45)
BILIRUB SERPL-MCNC: 0.7 MG/DL
BUN SERPL-MCNC: 20.9 MG/DL (ref 8–23)
CALCIUM SERPL-MCNC: 10.2 MG/DL (ref 8.8–10.4)
CHLORIDE SERPL-SCNC: 101 MMOL/L (ref 98–107)
CREAT SERPL-MCNC: 0.97 MG/DL (ref 0.51–0.95)
EGFRCR SERPLBLD CKD-EPI 2021: 64 ML/MIN/1.73M2
EST. AVERAGE GLUCOSE BLD GHB EST-MCNC: 160 MG/DL
GLUCOSE SERPL-MCNC: 193 MG/DL (ref 70–99)
HBA1C MFR BLD: 7.2 % (ref 0–5.6)
HCO3 SERPL-SCNC: 26 MMOL/L (ref 22–29)
POTASSIUM SERPL-SCNC: 4.1 MMOL/L (ref 3.4–5.3)
PROT SERPL-MCNC: 7.5 G/DL (ref 6.4–8.3)
SODIUM SERPL-SCNC: 137 MMOL/L (ref 135–145)
TSH SERPL DL<=0.005 MIU/L-ACNC: 3.69 UIU/ML (ref 0.3–4.2)

## 2025-07-19 DIAGNOSIS — E10.69 TYPE 1 DIABETES MELLITUS WITH OTHER SPECIFIED COMPLICATION (H): ICD-10-CM

## 2025-07-21 RX ORDER — INSULIN GLARGINE 100 [IU]/ML
INJECTION, SOLUTION SUBCUTANEOUS
Qty: 30 ML | Refills: 0 | Status: SHIPPED | OUTPATIENT
Start: 2025-07-21

## 2025-07-21 NOTE — TELEPHONE ENCOUNTER
Requested Prescriptions   Pending Prescriptions Disp Refills    insulin glargine (LANTUS SOLOSTAR) 100 UNIT/ML pen [Pharmacy Med Name: LANTUS SOLOSTAR PEN INJ 3ML] 75 mL 0     Sig: INJECT 65 UNITS UNDER THE SKIN EVERY MORNING AND INJECT 55 UNITS EBERY EVENING       Insulin Protocol Failed - 7/21/2025 12:51 PM        Failed - Medication is active on med list and the sig matches. RN to manually verify dose and sig if red X/fail.     If the protocol passes (green check), you do not need to verify med dose and sig.    A prescription matches if they are the same clinical intention.    For Example: once daily and every morning are the same.    The protocol can not identify upper and lower case letters as matching and will fail.     For Example: Take 1 tablet (50 mg) by mouth daily     TAKE 1 TABLET (50 MG) BY MOUTH DAILY    For all fails (red x), verify dose and sig.    If the refill does match what is on file, the RN can still proceed to approve the refill request.       If they do not match, route to the appropriate provider.             Failed - Chart review required     Review Chart.    Do not approve if insulin is used in a pump.  Instead, direct refill request to the patient's endocrinologist.  If the patient doesn't have an endocrinologist, then send the refill to the patient's PCP for review            Passed - HgbA1C in past 3 or 6 months     If HgbA1C is 8 or greater, it needs to be on file within the past 3 months.  If less than 8, must be on file within the past 6 months.     Recent Labs   Lab Test 07/17/25  0757   A1C 7.2*             Passed - Recent (6 month) or future (90 days) visit with the authorizing provider's specialty (provided they have been seen in the past 9 months)     The patient must have completed an in-person or virtual visit within the past 6 months or has a future visit scheduled within the next 90 days with the authorizing provider s specialty.  Urgent care and e-visits do not quality  as an office visit for this protocol.          Passed - Medication indicated for associated diagnosis     Medication is associated with one or more of the following diagnoses:   - Type 1 diabetes mellitus  - Type 2 diabetes mellitus  - Diabetic nephropathy; Prophylaxis  - Neuropathy due to diabetes mellitus; Prophylaxis  - Retinopathy due to diabetes mellitus; Prophylaxis  - Diabetes mellitus associated with cystic fibrosis  - Disorder of cardiovascular system; Prophylaxis - Type 1 diabetes mellitus   - Disorder of cardiovascular system; Prophylaxis - Type 2 diabetes mellitus            Passed - Patient is 18 years of age or older

## 2025-07-29 ENCOUNTER — OFFICE VISIT (OUTPATIENT)
Dept: ENDOCRINOLOGY | Facility: CLINIC | Age: 70
End: 2025-07-29
Payer: MEDICARE

## 2025-07-29 VITALS — OXYGEN SATURATION: 92 % | DIASTOLIC BLOOD PRESSURE: 69 MMHG | SYSTOLIC BLOOD PRESSURE: 115 MMHG | HEART RATE: 94 BPM

## 2025-07-29 DIAGNOSIS — N18.31 TYPE 1 DIABETES MELLITUS WITH STAGE 3A CHRONIC KIDNEY DISEASE (H): Primary | ICD-10-CM

## 2025-07-29 DIAGNOSIS — E03.9 HYPOTHYROIDISM, UNSPECIFIED TYPE: ICD-10-CM

## 2025-07-29 DIAGNOSIS — E10.22 TYPE 1 DIABETES MELLITUS WITH STAGE 3A CHRONIC KIDNEY DISEASE (H): Primary | ICD-10-CM

## 2025-07-29 PROCEDURE — 3078F DIAST BP <80 MM HG: CPT | Performed by: NURSE PRACTITIONER

## 2025-07-29 PROCEDURE — 99214 OFFICE O/P EST MOD 30 MIN: CPT | Performed by: NURSE PRACTITIONER

## 2025-07-29 PROCEDURE — 95251 CONT GLUC MNTR ANALYSIS I&R: CPT | Performed by: NURSE PRACTITIONER

## 2025-07-29 PROCEDURE — 3074F SYST BP LT 130 MM HG: CPT | Performed by: NURSE PRACTITIONER

## 2025-07-29 PROCEDURE — G2211 COMPLEX E/M VISIT ADD ON: HCPCS | Performed by: NURSE PRACTITIONER

## 2025-07-29 RX ORDER — INSULIN GLARGINE 100 [IU]/ML
INJECTION, SOLUTION SUBCUTANEOUS
Qty: 30 ML | Refills: 3 | Status: SHIPPED | OUTPATIENT
Start: 2025-07-29 | End: 2025-07-29

## 2025-07-29 RX ORDER — INSULIN GLARGINE 100 [IU]/ML
INJECTION, SOLUTION SUBCUTANEOUS
Qty: 30 ML | Refills: 5 | Status: SHIPPED | OUTPATIENT
Start: 2025-07-29

## 2025-07-29 NOTE — LETTER
"7/29/2025      Corinna Farias  [address]  [place] [address]      Dear Colleague,    Thank you for referring your patient, Corinna Farias, to the [facility]. Please see a copy of my visit note below.    [facility] ENDOCRINOLOGY    Diabetes Note 7/29/2025    Corinna Farias, 1955, [id number]          Reason for visit      1. Type 1 diabetes mellitus with stage 3a chronic kidney disease (H)    2. Hypothyroidism, unspecified type        HPI     Corinna Farias is a very pleasant 69 year old old female who presents for follow up.  SUMMARY:    Corinna returns today in follow-up for DM 1. Her current A1c is 7.2 and up from her previous of 6.6. She reports that she \"might have had some higher readings that pulled that up\" over the last 3 months. She continues to use the [employer] Freestyle CGM, which was downloaded and data was reviewed.     TIR was 67%, Above, 30% and below, 3%. She had no hypoglycemia that required any outside assistance. GMI of 7.0%    She continues to use Lantus insulin, 65 units in the morning and 55 units in the evening. She takes Humalog with her meals, about 30 units per meal.     Renal and Hepatic function are within normal range.     Current TSH is 3.69. She is taking Levothyroxine 75 mcg daily. She is having no problems referable to her neck.     Blood glucose data:      Past Medical History     Patient Active Problem List   Diagnosis     Bariatric surgery status (GASTRIC BYPASS)     Vitamin D deficiency     Essential hypertension, benign     GERD (gastroesophageal reflux disease)     Hypothyroidism     Microalbuminuric diabetic nephropathy (H)     Morbid obesity (H)     GIOVANI (obstructive sleep apnea)--will not use CPAP     Chronic kidney disease, stage 3     Cervical cancer screening     Encounter for long-term (current) use of insulin (H)     Female climacteric state     Insomnia     Pseudophakia     Type 1 diabetes mellitus (H)     Meningioma " (H)        Family History       family history includes Diabetes in her father and mother.    Social History      reports that she has never smoked. She has never used smokeless tobacco. She reports that she does not drink alcohol and does not use drugs.      Review of Systems     Patient has no polyuria or polydipsia, no chest pain, dyspnea or TIA's, no numbness, tingling or pain in extremities  Remainder negative except as noted in HPI.    Vital Signs     /69 (BP Location: Left arm, Patient Position: Sitting, Cuff Size: Adult Large)   Pulse 94   LMP  (LMP Unknown)   SpO2 92%   Wt Readings from Last 3 Encounters:   07/15/25 (!) 178.3 kg (393 lb)   01/28/25 (!) 178.3 kg (393 lb)   01/13/25 (!) 178.3 kg (393 lb)       Physical Exam     Constitutional:  Well developed, Well nourished  HENT:  Normocephalic,   Neck: Thyroid normal, No lymph nodes, Supple  Eyes:  PERRL, Conjunctiva pink  Respiratory:  Normal breath sounds, No respiratory distress  Cardiovascular:  Normal heart rate, Normal rhythm, No murmurs  GI:  Bowel sounds normal, Soft, No tenderness  Musculoskeletal:  No gross deformity or lesions,   Skin: No acanthosis nigricans, lipoatrophy or lipodystrophy  Neurologic:  Alert & oriented x 3, nonfocal  Psychiatric:  Affect, Mood, Insight appropriate      Assessment     1. Type 1 diabetes mellitus with stage 3a chronic kidney disease (H)    2. Hypothyroidism, unspecified type        Plan     Corinna's control is stable. No changes warranted. Follow-up with me in 6 months.           Rossy Baig NP   Endocrinology  7/29/2025  7:55 AM    Lab Results     Microalbumin Urine mg/dL   Date Value Ref Range Status   10/15/2020 6.40 (H) 0.00 - 1.99 mg/dL Final       Cholesterol   Date Value Ref Range Status   01/06/2025 136 <200 mg/dL Final     Direct Measure HDL   Date Value Ref Range Status   01/06/2025 47 (L) >=50 mg/dL Final     Triglycerides   Date Value Ref Range Status   01/06/2025 105 <150 mg/dL Final        [unfilled]      Current Medications     Outpatient Medications Prior to Visit   Medication Sig Dispense Refill     ASPIRIN PO Take 81 mg by mouth daily       atorvastatin (LIPITOR) 20 MG tablet TAKE 1 TABLET(20 MG) BY MOUTH DAILY 90 tablet 0     Continuous Glucose Sensor (FREESTYLE [place] 14 DAY SENSOR) MISC CHANGE SENSOR EVERY 14 DAYS AS DIRECTED 6 each 1     HUMALOG KWIKPEN 100 UNIT/ML soln INJECT UNDER THE SKIN AS DIRECTED THREE TIMES DAILY WITH MEALS(MAXIMUM  UNITS PER DAY) 90 mL 3     levothyroxine (SYNTHROID/LEVOTHROID) 75 MCG tablet TAKE 1 TABLET BY MOUTH DAILY 1 HOUR BEFORE OR 2 HOURS AFTER A MEAL 90 tablet 3     lisinopril (ZESTRIL) 20 MG tablet TAKE 1 TABLET(20 MG) BY MOUTH DAILY 90 tablet 1     Multiple Vitamin (DAILY-MICHAEL MULTIVITAMIN) TABS Take 1 tablet by mouth daily. 90 tablet 1     pantoprazole (PROTONIX) 40 MG EC tablet TAKE 1 TABLET(40 MG) BY MOUTH DAILY 90 tablet 1     traZODone (DESYREL) 100 MG tablet TAKE 1 TABLET BY MOUTH AT BEDTIME AS NEEDED FOR SLEEP 90 tablet 1     vitamin D3 25 mcg (1000 units) tablet Take 2 tablets (50 mcg) by mouth daily. 180 tablet 2     insulin glargine (LANTUS SOLOSTAR) 100 UNIT/ML pen INJECT 65 UNITS UNDER THE SKIN EVERY MORNING AND INJECT 55 UNITS EBERY EVENING 30 mL 0     No facility-administered medications prior to visit.             Again, thank you for allowing me to participate in the care of your patient.        Sincerely,        Rossy Baig NP    Electronically signed

## 2025-07-29 NOTE — PROGRESS NOTES
"[facility] ENDOCRINOLOGY    Diabetes Note 7/29/2025    Corinna Farias, 1955, [id number]          Reason for visit      1. Type 1 diabetes mellitus with stage 3a chronic kidney disease (H)    2. Hypothyroidism, unspecified type        HPI     Corinna Farias is a very pleasant 69 year old old female who presents for follow up.  SUMMARY:    Corinna returns today in follow-up for DM 1. Her current A1c is 7.2 and up from her previous of 6.6. She reports that she \"might have had some higher readings that pulled that up\" over the last 3 months. She continues to use the [employer] CGM, which was downloaded and data was reviewed.     TIR was 67%, Above, 30% and below, 3%. She had no hypoglycemia that required any outside assistance. GMI of 7.0%    She continues to use Lantus insulin, 65 units in the morning and 55 units in the evening. She takes Humalog with her meals, about 30 units per meal.     Renal and Hepatic function are within normal range.     Current TSH is 3.69. She is taking Levothyroxine 75 mcg daily. She is having no problems referable to her neck.     Blood glucose data:      Past Medical History     Patient Active Problem List   Diagnosis    Bariatric surgery status (GASTRIC BYPASS)    Vitamin D deficiency    Essential hypertension, benign    GERD (gastroesophageal reflux disease)    Hypothyroidism    Microalbuminuric diabetic nephropathy (H)    Morbid obesity (H)    GIOVANI (obstructive sleep apnea)--will not use CPAP    Chronic kidney disease, stage 3    Cervical cancer screening    Encounter for long-term (current) use of insulin (H)    Female climacteric state    Insomnia    Pseudophakia    Type 1 diabetes mellitus (H)    Meningioma (H)        Family History       family history includes Diabetes in her father and mother.    Social History      reports that she has never smoked. She has never used smokeless tobacco. She reports that she does not drink alcohol and does not use " drugs.      Review of Systems     Patient has no polyuria or polydipsia, no chest pain, dyspnea or TIA's, no numbness, tingling or pain in extremities  Remainder negative except as noted in HPI.    Vital Signs     /69 (BP Location: Left arm, Patient Position: Sitting, Cuff Size: Adult Large)   Pulse 94   LMP  (LMP Unknown)   SpO2 92%   Wt Readings from Last 3 Encounters:   07/15/25 (!) 178.3 kg (393 lb)   01/28/25 (!) 178.3 kg (393 lb)   01/13/25 (!) 178.3 kg (393 lb)       Physical Exam     Constitutional:  Well developed, Well nourished  HENT:  Normocephalic,   Neck: Thyroid normal, No lymph nodes, Supple  Eyes:  PERRL, Conjunctiva pink  Respiratory:  Normal breath sounds, No respiratory distress  Cardiovascular:  Normal heart rate, Normal rhythm, No murmurs  GI:  Bowel sounds normal, Soft, No tenderness  Musculoskeletal:  No gross deformity or lesions,   Skin: No acanthosis nigricans, lipoatrophy or lipodystrophy  Neurologic:  Alert & oriented x 3, nonfocal  Psychiatric:  Affect, Mood, Insight appropriate      Assessment     1. Type 1 diabetes mellitus with stage 3a chronic kidney disease (H)    2. Hypothyroidism, unspecified type        Plan     Corinna's control is stable. No changes warranted. Follow-up with me in 6 months.           Rossy Baig NP  [facility]  7/29/2025  7:55 AM    Lab Results     Microalbumin Urine mg/dL   Date Value Ref Range Status   10/15/2020 6.40 (H) 0.00 - 1.99 mg/dL Final       Cholesterol   Date Value Ref Range Status   01/06/2025 136 <200 mg/dL Final     Direct Measure HDL   Date Value Ref Range Status   01/06/2025 47 (L) >=50 mg/dL Final     Triglycerides   Date Value Ref Range Status   01/06/2025 105 <150 mg/dL Final       [unfilled]      Current Medications     Outpatient Medications Prior to Visit   Medication Sig Dispense Refill    ASPIRIN PO Take 81 mg by mouth daily      atorvastatin (LIPITOR) 20 MG tablet TAKE 1 TABLET(20 MG) BY MOUTH DAILY 90 tablet 0     Continuous Glucose Sensor (FREESTYLE [place] 14 DAY SENSOR) [facility] [place] EVERY 14 DAYS AS DIRECTED 6 each 1    HUMALOG KWIKPEN 100 UNIT/ML soln INJECT UNDER THE SKIN AS DIRECTED THREE TIMES DAILY WITH MEALS(MAXIMUM  UNITS PER DAY) 90 mL 3    levothyroxine (SYNTHROID/LEVOTHROID) 75 MCG tablet TAKE 1 TABLET BY MOUTH DAILY 1 HOUR BEFORE OR 2 HOURS AFTER A MEAL 90 tablet 3    lisinopril (ZESTRIL) 20 MG tablet TAKE 1 TABLET(20 MG) BY MOUTH DAILY 90 tablet 1    Multiple Vitamin (DAILY-MICHAEL MULTIVITAMIN) TABS Take 1 tablet by mouth daily. 90 tablet 1    pantoprazole (PROTONIX) 40 MG EC tablet TAKE 1 TABLET(40 MG) BY MOUTH DAILY 90 tablet 1    traZODone (DESYREL) 100 MG tablet TAKE 1 TABLET BY MOUTH AT BEDTIME AS NEEDED FOR SLEEP 90 tablet 1    vitamin D3 25 mcg (1000 units) tablet Take 2 tablets (50 mcg) by mouth daily. 180 tablet 2    insulin glargine (LANTUS SOLOSTAR) 100 UNIT/ML pen INJECT 65 UNITS UNDER THE SKIN EVERY MORNING AND INJECT 55 UNITS EBERY EVENING 30 mL 0     No facility-administered medications prior to visit.

## 2025-08-09 ENCOUNTER — HEALTH MAINTENANCE LETTER (OUTPATIENT)
Age: 70
End: 2025-08-09